# Patient Record
Sex: MALE | Race: BLACK OR AFRICAN AMERICAN | NOT HISPANIC OR LATINO
[De-identification: names, ages, dates, MRNs, and addresses within clinical notes are randomized per-mention and may not be internally consistent; named-entity substitution may affect disease eponyms.]

---

## 2017-01-03 ENCOUNTER — APPOINTMENT (OUTPATIENT)
Dept: INTERNAL MEDICINE | Facility: CLINIC | Age: 47
End: 2017-01-03

## 2017-01-03 VITALS
TEMPERATURE: 98.1 F | OXYGEN SATURATION: 98 % | DIASTOLIC BLOOD PRESSURE: 88 MMHG | HEART RATE: 76 BPM | BODY MASS INDEX: 34.8 KG/M2 | RESPIRATION RATE: 14 BRPM | SYSTOLIC BLOOD PRESSURE: 128 MMHG | HEIGHT: 69 IN | WEIGHT: 235 LBS

## 2017-01-06 LAB
AMPHET UR-MCNC: NEGATIVE
BARBITURATES UR-MCNC: NEGATIVE
BENZODIAZ UR-MCNC: NEGATIVE
COCAINE METAB.OTHER UR-MCNC: NEGATIVE
CREATININE, URINE: 133.7 MG/DL
METHADONE UR-MCNC: NEGATIVE
METHAQUALONE UR-MCNC: NEGATIVE
OPIATES UR-MCNC: NEGATIVE
PCP UR-MCNC: NEGATIVE
PROPOXYPH UR QL: NEGATIVE
THC UR QL: NEGATIVE

## 2017-05-11 ENCOUNTER — RX RENEWAL (OUTPATIENT)
Age: 47
End: 2017-05-11

## 2017-06-26 ENCOUNTER — APPOINTMENT (OUTPATIENT)
Dept: INTERNAL MEDICINE | Facility: CLINIC | Age: 47
End: 2017-06-26

## 2017-07-11 ENCOUNTER — APPOINTMENT (OUTPATIENT)
Dept: INTERNAL MEDICINE | Facility: CLINIC | Age: 47
End: 2017-07-11

## 2017-07-13 ENCOUNTER — APPOINTMENT (OUTPATIENT)
Dept: INTERNAL MEDICINE | Facility: CLINIC | Age: 47
End: 2017-07-13

## 2017-07-13 ENCOUNTER — APPOINTMENT (OUTPATIENT)
Dept: DERMATOLOGY | Facility: CLINIC | Age: 47
End: 2017-07-13

## 2017-07-13 VITALS
RESPIRATION RATE: 14 BRPM | HEIGHT: 69 IN | HEART RATE: 69 BPM | OXYGEN SATURATION: 98 % | SYSTOLIC BLOOD PRESSURE: 130 MMHG | TEMPERATURE: 98 F | WEIGHT: 232 LBS | BODY MASS INDEX: 34.36 KG/M2 | DIASTOLIC BLOOD PRESSURE: 90 MMHG

## 2017-07-13 VITALS — DIASTOLIC BLOOD PRESSURE: 90 MMHG | SYSTOLIC BLOOD PRESSURE: 130 MMHG

## 2017-07-13 DIAGNOSIS — B35.3 TINEA PEDIS: ICD-10-CM

## 2017-07-13 DIAGNOSIS — Z87.891 PERSONAL HISTORY OF NICOTINE DEPENDENCE: ICD-10-CM

## 2017-07-13 DIAGNOSIS — Z86.39 PERSONAL HISTORY OF OTHER ENDOCRINE, NUTRITIONAL AND METABOLIC DISEASE: ICD-10-CM

## 2017-07-13 DIAGNOSIS — Z80.8 FAMILY HISTORY OF MALIGNANT NEOPLASM OF OTHER ORGANS OR SYSTEMS: ICD-10-CM

## 2017-07-13 DIAGNOSIS — L73.9 FOLLICULAR DISORDER, UNSPECIFIED: ICD-10-CM

## 2017-07-17 LAB
ADJUSTED MITOGEN: >10 IU/ML
ADJUSTED TB AG: -0.02 IU/ML
M TB IFN-G BLD-IMP: NEGATIVE
QUANTIFERON GOLD NIL: 0.06 IU/ML

## 2017-07-31 ENCOUNTER — RX RENEWAL (OUTPATIENT)
Age: 47
End: 2017-07-31

## 2017-08-10 ENCOUNTER — APPOINTMENT (OUTPATIENT)
Dept: DERMATOLOGY | Facility: CLINIC | Age: 47
End: 2017-08-10

## 2017-10-21 ENCOUNTER — RX RENEWAL (OUTPATIENT)
Age: 47
End: 2017-10-21

## 2017-12-13 ENCOUNTER — RX RENEWAL (OUTPATIENT)
Age: 47
End: 2017-12-13

## 2018-03-02 ENCOUNTER — APPOINTMENT (OUTPATIENT)
Dept: INTERNAL MEDICINE | Facility: CLINIC | Age: 48
End: 2018-03-02

## 2018-04-23 ENCOUNTER — APPOINTMENT (OUTPATIENT)
Dept: INTERNAL MEDICINE | Facility: CLINIC | Age: 48
End: 2018-04-23

## 2018-07-27 PROBLEM — Z80.8 FAMILY HISTORY OF SKIN CANCER: Status: INACTIVE | Noted: 2017-07-13

## 2019-02-16 ENCOUNTER — HOSPITAL ENCOUNTER (EMERGENCY)
Age: 49
Discharge: HOME | End: 2019-02-17
Attending: EMERGENCY MEDICINE
Payer: SELF-PAY

## 2019-02-16 VITALS
WEIGHT: 240 LBS | BODY MASS INDEX: 33.6 KG/M2 | HEIGHT: 71 IN | DIASTOLIC BLOOD PRESSURE: 88 MMHG | HEART RATE: 99 BPM | TEMPERATURE: 98.5 F | RESPIRATION RATE: 14 BRPM | SYSTOLIC BLOOD PRESSURE: 181 MMHG | OXYGEN SATURATION: 100 %
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
## 2019-02-16 DIAGNOSIS — S39.012A BACK STRAIN, INITIAL ENCOUNTER: Primary | ICD-10-CM

## 2019-02-16 PROCEDURE — 99284 EMERGENCY DEPT VISIT MOD MDM: CPT

## 2019-02-16 RX ORDER — IBUPROFEN 400 MG/1
800 TABLET ORAL
Status: COMPLETED | OUTPATIENT
Start: 2019-02-16 | End: 2019-02-17

## 2019-02-17 ENCOUNTER — APPOINTMENT (OUTPATIENT)
Dept: GENERAL RADIOLOGY | Age: 49
End: 2019-02-17
Attending: EMERGENCY MEDICINE
Payer: SELF-PAY

## 2019-02-17 PROCEDURE — 72100 X-RAY EXAM L-S SPINE 2/3 VWS: CPT

## 2019-02-17 PROCEDURE — 74011250637 HC RX REV CODE- 250/637: Performed by: EMERGENCY MEDICINE

## 2019-02-17 RX ORDER — CYCLOBENZAPRINE HCL 10 MG
10 TABLET ORAL
Qty: 15 TAB | Refills: 0 | Status: SHIPPED | OUTPATIENT
Start: 2019-02-17

## 2019-02-17 RX ORDER — IBUPROFEN 600 MG/1
600 TABLET ORAL
Qty: 30 TAB | Refills: 0 | Status: SHIPPED | OUTPATIENT
Start: 2019-02-17

## 2019-02-17 RX ADMIN — IBUPROFEN 800 MG: 400 TABLET ORAL at 00:18
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
## 2019-02-27 ENCOUNTER — APPOINTMENT (OUTPATIENT)
Dept: INTERNAL MEDICINE | Facility: CLINIC | Age: 49
End: 2019-02-27

## 2020-05-08 ENCOUNTER — TRANSCRIPTION ENCOUNTER (OUTPATIENT)
Age: 50
End: 2020-05-08

## 2020-05-12 ENCOUNTER — TRANSCRIPTION ENCOUNTER (OUTPATIENT)
Age: 50
End: 2020-05-12

## 2020-07-13 ENCOUNTER — APPOINTMENT (OUTPATIENT)
Dept: INTERNAL MEDICINE | Facility: CLINIC | Age: 50
End: 2020-07-13
Payer: SELF-PAY

## 2020-07-13 ENCOUNTER — EMERGENCY (EMERGENCY)
Facility: HOSPITAL | Age: 50
LOS: 1 days | Discharge: ANOTHER HOSPITAL | End: 2020-07-13
Attending: EMERGENCY MEDICINE | Admitting: EMERGENCY MEDICINE
Payer: MEDICAID

## 2020-07-13 ENCOUNTER — INPATIENT (INPATIENT)
Facility: HOSPITAL | Age: 50
LOS: 2 days | Discharge: HOME | DRG: 872 | End: 2020-07-16
Attending: STUDENT IN AN ORGANIZED HEALTH CARE EDUCATION/TRAINING PROGRAM | Admitting: STUDENT IN AN ORGANIZED HEALTH CARE EDUCATION/TRAINING PROGRAM
Payer: MEDICAID

## 2020-07-13 VITALS
RESPIRATION RATE: 18 BRPM | HEART RATE: 98 BPM | WEIGHT: 257.06 LBS | TEMPERATURE: 100 F | HEIGHT: 71 IN | SYSTOLIC BLOOD PRESSURE: 148 MMHG | DIASTOLIC BLOOD PRESSURE: 84 MMHG

## 2020-07-13 VITALS
TEMPERATURE: 101 F | SYSTOLIC BLOOD PRESSURE: 150 MMHG | HEART RATE: 108 BPM | DIASTOLIC BLOOD PRESSURE: 85 MMHG | OXYGEN SATURATION: 96 % | WEIGHT: 257.06 LBS | HEIGHT: 71 IN | RESPIRATION RATE: 16 BRPM

## 2020-07-13 VITALS
DIASTOLIC BLOOD PRESSURE: 80 MMHG | SYSTOLIC BLOOD PRESSURE: 135 MMHG | TEMPERATURE: 99 F | OXYGEN SATURATION: 97 % | RESPIRATION RATE: 16 BRPM | HEART RATE: 107 BPM

## 2020-07-13 VITALS
OXYGEN SATURATION: 98 % | RESPIRATION RATE: 16 BRPM | HEIGHT: 69 IN | SYSTOLIC BLOOD PRESSURE: 136 MMHG | DIASTOLIC BLOOD PRESSURE: 88 MMHG | HEART RATE: 117 BPM | TEMPERATURE: 99 F

## 2020-07-13 DIAGNOSIS — E03.9 HYPOTHYROIDISM, UNSPECIFIED: ICD-10-CM

## 2020-07-13 DIAGNOSIS — N50.82 SCROTAL PAIN: ICD-10-CM

## 2020-07-13 DIAGNOSIS — A41.9 SEPSIS, UNSPECIFIED ORGANISM: ICD-10-CM

## 2020-07-13 DIAGNOSIS — N45.3 EPIDIDYMO-ORCHITIS: ICD-10-CM

## 2020-07-13 DIAGNOSIS — Z29.9 ENCOUNTER FOR PROPHYLACTIC MEASURES, UNSPECIFIED: ICD-10-CM

## 2020-07-13 DIAGNOSIS — Z98.890 OTHER SPECIFIED POSTPROCEDURAL STATES: Chronic | ICD-10-CM

## 2020-07-13 LAB
ALBUMIN SERPL ELPH-MCNC: 3.8 G/DL — SIGNIFICANT CHANGE UP (ref 3.3–5)
ALP SERPL-CCNC: 54 U/L — SIGNIFICANT CHANGE UP (ref 30–120)
ALT FLD-CCNC: 34 U/L DA — SIGNIFICANT CHANGE UP (ref 10–60)
ANION GAP SERPL CALC-SCNC: 9 MMOL/L — SIGNIFICANT CHANGE UP (ref 5–17)
APPEARANCE UR: CLEAR — SIGNIFICANT CHANGE UP
APTT BLD: 31.5 SEC — SIGNIFICANT CHANGE UP (ref 27.5–35.5)
AST SERPL-CCNC: 27 U/L — SIGNIFICANT CHANGE UP (ref 10–40)
BACTERIA # UR AUTO: ABNORMAL
BASOPHILS # BLD AUTO: 0.03 K/UL — SIGNIFICANT CHANGE UP (ref 0–0.2)
BASOPHILS NFR BLD AUTO: 0.2 % — SIGNIFICANT CHANGE UP (ref 0–2)
BILIRUB SERPL-MCNC: 0.7 MG/DL — SIGNIFICANT CHANGE UP (ref 0.2–1.2)
BILIRUB UR-MCNC: NEGATIVE — SIGNIFICANT CHANGE UP
BUN SERPL-MCNC: 11 MG/DL — SIGNIFICANT CHANGE UP (ref 7–23)
CALCIUM SERPL-MCNC: 9 MG/DL — SIGNIFICANT CHANGE UP (ref 8.4–10.5)
CHLORIDE SERPL-SCNC: 98 MMOL/L — SIGNIFICANT CHANGE UP (ref 96–108)
CO2 SERPL-SCNC: 28 MMOL/L — SIGNIFICANT CHANGE UP (ref 22–31)
COLOR SPEC: YELLOW — SIGNIFICANT CHANGE UP
CREAT SERPL-MCNC: 1.16 MG/DL — SIGNIFICANT CHANGE UP (ref 0.5–1.3)
DIFF PNL FLD: ABNORMAL
EOSINOPHIL # BLD AUTO: 0.02 K/UL — SIGNIFICANT CHANGE UP (ref 0–0.5)
EOSINOPHIL NFR BLD AUTO: 0.1 % — SIGNIFICANT CHANGE UP (ref 0–6)
EPI CELLS # UR: NEGATIVE — SIGNIFICANT CHANGE UP
GLUCOSE SERPL-MCNC: 105 MG/DL — HIGH (ref 70–99)
GLUCOSE UR QL: NEGATIVE MG/DL — SIGNIFICANT CHANGE UP
HCT VFR BLD CALC: 44.9 % — SIGNIFICANT CHANGE UP (ref 39–50)
HGB BLD-MCNC: 13.8 G/DL — SIGNIFICANT CHANGE UP (ref 13–17)
IMM GRANULOCYTES NFR BLD AUTO: 0.5 % — SIGNIFICANT CHANGE UP (ref 0–1.5)
INR BLD: 1.16 RATIO — SIGNIFICANT CHANGE UP (ref 0.88–1.16)
KETONES UR-MCNC: NEGATIVE — SIGNIFICANT CHANGE UP
LACTATE SERPL-SCNC: 0.8 MMOL/L — SIGNIFICANT CHANGE UP (ref 0.7–2)
LEUKOCYTE ESTERASE UR-ACNC: NEGATIVE — SIGNIFICANT CHANGE UP
LYMPHOCYTES # BLD AUTO: 1.1 K/UL — SIGNIFICANT CHANGE UP (ref 1–3.3)
LYMPHOCYTES # BLD AUTO: 7.6 % — LOW (ref 13–44)
MCHC RBC-ENTMCNC: 27.6 PG — SIGNIFICANT CHANGE UP (ref 27–34)
MCHC RBC-ENTMCNC: 30.7 GM/DL — LOW (ref 32–36)
MCV RBC AUTO: 89.8 FL — SIGNIFICANT CHANGE UP (ref 80–100)
MONOCYTES # BLD AUTO: 0.73 K/UL — SIGNIFICANT CHANGE UP (ref 0–0.9)
MONOCYTES NFR BLD AUTO: 5 % — SIGNIFICANT CHANGE UP (ref 2–14)
NEUTROPHILS # BLD AUTO: 12.53 K/UL — HIGH (ref 1.8–7.4)
NEUTROPHILS NFR BLD AUTO: 86.6 % — HIGH (ref 43–77)
NITRITE UR-MCNC: POSITIVE
NRBC # BLD: 0 /100 WBCS — SIGNIFICANT CHANGE UP (ref 0–0)
PH UR: 8 — SIGNIFICANT CHANGE UP (ref 5–8)
PLATELET # BLD AUTO: 248 K/UL — SIGNIFICANT CHANGE UP (ref 150–400)
POTASSIUM SERPL-MCNC: 3.8 MMOL/L — SIGNIFICANT CHANGE UP (ref 3.5–5.3)
POTASSIUM SERPL-SCNC: 3.8 MMOL/L — SIGNIFICANT CHANGE UP (ref 3.5–5.3)
PROT SERPL-MCNC: 8.1 G/DL — SIGNIFICANT CHANGE UP (ref 6–8.3)
PROT UR-MCNC: NEGATIVE MG/DL — SIGNIFICANT CHANGE UP
PROTHROM AB SERPL-ACNC: 13.9 SEC — HIGH (ref 10.6–13.6)
RBC # BLD: 5 M/UL — SIGNIFICANT CHANGE UP (ref 4.2–5.8)
RBC # FLD: 12.9 % — SIGNIFICANT CHANGE UP (ref 10.3–14.5)
RBC CASTS # UR COMP ASSIST: SIGNIFICANT CHANGE UP /HPF (ref 0–4)
SODIUM SERPL-SCNC: 135 MMOL/L — SIGNIFICANT CHANGE UP (ref 135–145)
SP GR SPEC: 1.01 — SIGNIFICANT CHANGE UP (ref 1.01–1.02)
UROBILINOGEN FLD QL: NEGATIVE MG/DL — SIGNIFICANT CHANGE UP
WBC # BLD: 14.48 K/UL — HIGH (ref 3.8–10.5)
WBC # FLD AUTO: 14.48 K/UL — HIGH (ref 3.8–10.5)
WBC UR QL: SIGNIFICANT CHANGE UP

## 2020-07-13 PROCEDURE — 99213 OFFICE O/P EST LOW 20 MIN: CPT

## 2020-07-13 PROCEDURE — 99285 EMERGENCY DEPT VISIT HI MDM: CPT | Mod: 25

## 2020-07-13 PROCEDURE — 80053 COMPREHEN METABOLIC PANEL: CPT

## 2020-07-13 PROCEDURE — 76870 US EXAM SCROTUM: CPT | Mod: 26

## 2020-07-13 PROCEDURE — 71046 X-RAY EXAM CHEST 2 VIEWS: CPT | Mod: 26

## 2020-07-13 PROCEDURE — 74177 CT ABD & PELVIS W/CONTRAST: CPT | Mod: 26

## 2020-07-13 PROCEDURE — 74177 CT ABD & PELVIS W/CONTRAST: CPT

## 2020-07-13 PROCEDURE — 85610 PROTHROMBIN TIME: CPT

## 2020-07-13 PROCEDURE — 85730 THROMBOPLASTIN TIME PARTIAL: CPT

## 2020-07-13 PROCEDURE — 93010 ELECTROCARDIOGRAM REPORT: CPT

## 2020-07-13 PROCEDURE — 83605 ASSAY OF LACTIC ACID: CPT

## 2020-07-13 PROCEDURE — 93005 ELECTROCARDIOGRAM TRACING: CPT

## 2020-07-13 PROCEDURE — 99285 EMERGENCY DEPT VISIT HI MDM: CPT

## 2020-07-13 PROCEDURE — 96374 THER/PROPH/DIAG INJ IV PUSH: CPT | Mod: XU

## 2020-07-13 PROCEDURE — 99223 1ST HOSP IP/OBS HIGH 75: CPT | Mod: GC,AI

## 2020-07-13 PROCEDURE — 76870 US EXAM SCROTUM: CPT

## 2020-07-13 PROCEDURE — 85027 COMPLETE CBC AUTOMATED: CPT

## 2020-07-13 PROCEDURE — 87040 BLOOD CULTURE FOR BACTERIA: CPT

## 2020-07-13 PROCEDURE — 36415 COLL VENOUS BLD VENIPUNCTURE: CPT

## 2020-07-13 PROCEDURE — 96375 TX/PRO/DX INJ NEW DRUG ADDON: CPT

## 2020-07-13 PROCEDURE — 81001 URINALYSIS AUTO W/SCOPE: CPT

## 2020-07-13 PROCEDURE — 99283 EMERGENCY DEPT VISIT LOW MDM: CPT

## 2020-07-13 PROCEDURE — 71046 X-RAY EXAM CHEST 2 VIEWS: CPT

## 2020-07-13 PROCEDURE — 96361 HYDRATE IV INFUSION ADD-ON: CPT

## 2020-07-13 RX ORDER — ONDANSETRON 8 MG/1
4 TABLET, FILM COATED ORAL ONCE
Refills: 0 | Status: COMPLETED | OUTPATIENT
Start: 2020-07-13 | End: 2020-07-13

## 2020-07-13 RX ORDER — ACETAMINOPHEN 500 MG
650 TABLET ORAL EVERY 4 HOURS
Refills: 0 | Status: DISCONTINUED | OUTPATIENT
Start: 2020-07-13 | End: 2020-07-14

## 2020-07-13 RX ORDER — OXYCODONE HYDROCHLORIDE 5 MG/1
5 TABLET ORAL EVERY 4 HOURS
Refills: 0 | Status: DISCONTINUED | OUTPATIENT
Start: 2020-07-13 | End: 2020-07-14

## 2020-07-13 RX ORDER — ENOXAPARIN SODIUM 100 MG/ML
40 INJECTION SUBCUTANEOUS DAILY
Refills: 0 | Status: DISCONTINUED | OUTPATIENT
Start: 2020-07-13 | End: 2020-07-16

## 2020-07-13 RX ORDER — MORPHINE SULFATE 50 MG/1
4 CAPSULE, EXTENDED RELEASE ORAL ONCE
Refills: 0 | Status: DISCONTINUED | OUTPATIENT
Start: 2020-07-13 | End: 2020-07-13

## 2020-07-13 RX ORDER — SODIUM CHLORIDE 9 MG/ML
1000 INJECTION INTRAMUSCULAR; INTRAVENOUS; SUBCUTANEOUS ONCE
Refills: 0 | Status: COMPLETED | OUTPATIENT
Start: 2020-07-13 | End: 2020-07-13

## 2020-07-13 RX ORDER — ENOXAPARIN SODIUM 100 MG/ML
40 INJECTION SUBCUTANEOUS DAILY
Refills: 0 | Status: DISCONTINUED | OUTPATIENT
Start: 2020-07-13 | End: 2020-07-17

## 2020-07-13 RX ORDER — MORPHINE SULFATE 50 MG/1
2 CAPSULE, EXTENDED RELEASE ORAL EVERY 4 HOURS
Refills: 0 | Status: DISCONTINUED | OUTPATIENT
Start: 2020-07-13 | End: 2020-07-14

## 2020-07-13 RX ADMIN — ONDANSETRON 4 MILLIGRAM(S): 8 TABLET, FILM COATED ORAL at 18:37

## 2020-07-13 RX ADMIN — MORPHINE SULFATE 4 MILLIGRAM(S): 50 CAPSULE, EXTENDED RELEASE ORAL at 18:38

## 2020-07-13 RX ADMIN — SODIUM CHLORIDE 1000 MILLILITER(S): 9 INJECTION INTRAMUSCULAR; INTRAVENOUS; SUBCUTANEOUS at 20:46

## 2020-07-13 RX ADMIN — SODIUM CHLORIDE 1000 MILLILITER(S): 9 INJECTION INTRAMUSCULAR; INTRAVENOUS; SUBCUTANEOUS at 18:37

## 2020-07-13 NOTE — ED PROVIDER NOTE - ENMT, MLM
Airway patent, Nasal mucosa clear. Mouth with normal mucosa. Throat has no vesicles, no oropharyngeal exudates and uvula is midline. Airway patent, Nasal mucosa clear. Mouth with normal mucosa. Throat has no vesicles, no oropharyngeal exudates and uvula is midline. MM Moist. non-toxic, well appearing.

## 2020-07-13 NOTE — ED PROVIDER NOTE - GENITOURINARY, MLM
+diffuse swelling to scrotum bilateral, left greater than right, large firm tender mass inside left scrotum, unable to distinctly palpate left testicle, tenderness up to inguinal canal, unable to assess cremasteric on left

## 2020-07-13 NOTE — H&P ADULT - ASSESSMENT
49 year old male PMHx hypothyroidism presents as transfer from [place] c/o left groin pain and swelling x5 days admitted for sepsis likely 2/2 left epididymoorchitis.

## 2020-07-13 NOTE — H&P ADULT - NSHPSOCIALHISTORY_GEN_ALL_CORE
Marital Status:  (   )    (   ) Single    (   )    (  )   Lives with: (  ) alone  (  ) children   (  ) spouse   (  ) parents  (  ) other  Recent Travel: No recent travel  Occupation:  Mobility:   Substance Use (street drugs): ( x ) never used  (  ) other:  Tobacco Usage:  ( x  ) never smoked   (   ) former smoker   (   ) current smoker  (     ) pack year  Alcohol Usage: None Marital Status:  (  x )    (   ) Single    (   )    (  )   Lives with: (  ) alone  ( x ) children   ( x ) spouse   (  ) parents  (  ) other  Recent Travel: No recent travel  Mobility: independently   Substance Use (street drugs): ( x ) never used  (  ) other:  Tobacco Usage:  ( x  ) never smoked   (   ) former smoker   (   ) current smoker  (     ) pack year  Alcohol Usage: socially  Sexual Hx: pt sexually active with wife only for many years, no hx of STDs, was HIV testing back in [place], denies testing today states he has no reason for infections

## 2020-07-13 NOTE — H&P ADULT - NSHPREVIEWOFSYSTEMS_GEN_ALL_CORE
Constitutional: denies fever, chills, diaphoresis   HEENT: denies blurry vision, double vision, eye pain, difficulty hearing  Respiratory: denies SOB, cough, sputum production  Cardiovascular: denies CP, palpitations, edema  Gastrointestinal: denies nausea, vomiting, diarrhea, constipation, abdominal pain  Genitourinary: denies dysuria, frequency, urgency, hematuria   Skin/Breast: denies rash, itching  Neurologic: denies headache, weakness, dizziness, paresthesias, numbness/tingling  Psychiatric: denies anxiety, depression, suicidal, homicidal thoughts  ROS negative except as noted above

## 2020-07-13 NOTE — ED ADULT NURSE NOTE - OBJECTIVE STATEMENT
Pt received sitting on stretcher in NAD. Pt AOx3 C/o groin pain. pt states that his left testicle was also swollen. _. Neuro WNL. PERRLA. Lungs CTA, RR even unlabored. Ab soft non tender, + bowel sounds x 4quads. Denies Nausea, Vomiting, Diarrhea. Skin warm, dry, color appropriate for age and race. MAEx4

## 2020-07-13 NOTE — H&P ADULT - NSHPREVIEWOFSYSTEMS_GEN_ALL_CORE
Constitutional: denies fever, chills, diaphoresis   HEENT: denies blurry vision, double vision, eye pain, difficulty hearing  Respiratory: denies SOB, cough, sputum production  Cardiovascular: denies CP, palpitations, edema  Gastrointestinal: denies nausea, vomiting, diarrhea, constipation, abdominal pain  Genitourinary: denies dysuria, frequency, urgency, hematuria   Skin/Breast: denies rash, itching  Neurologic: denies headache, weakness, dizziness, paresthesias, numbness/tingling  Psychiatric: denies anxiety, depression, suicidal, homicidal thoughts  ROS negative except as noted above Constitutional: denies fever, chills, diaphoresis   HEENT: denies blurry vision, double vision, eye pain, difficulty hearing  Respiratory: denies SOB, cough, sputum production  Cardiovascular: denies CP, palpitations, edema  Gastrointestinal: denies nausea, vomiting, diarrhea, constipation, abdominal pain  Genitourinary: endorses L testicular swelling and pain, denies dysuria, frequency, urgency, hematuria   Skin/Breast: denies rash, itching  Neurologic: denies headache, weakness, dizziness, paresthesias, numbness/tingling  Psychiatric: denies anxiety, depression, suicidal, homicidal thoughts

## 2020-07-13 NOTE — H&P ADULT - ASSESSMENT
49 year old male PMHx hypothyroidism presents as transfer from [place] c/o left groin pain and swelling x5 days admitted for left epididymoorchitis.

## 2020-07-13 NOTE — ED ADULT NURSE NOTE - OBJECTIVE STATEMENT
patient has c/o testicular pain since Wednesday, took motrin and it helped a bit but got worse today, stated feeling swollen, Patient denies sick contacts/ no fever/chills/cough at this time, denies SOB and no acute distress. also febrile, abdomen is round and distended but it is baseline, denies any abdominal pain, nontender to touch and soft, bm is normal, bs positive all quadrants of abdomen. IV accessed and tolerating. Labs drawn & sent results pending. MD at bedside and assess patient. will continue to monitor.

## 2020-07-13 NOTE — H&P ADULT - HISTORY OF PRESENT ILLNESS
49 year old male PMHx hypothyroidism presents as transfer from [place] c/o left groin pain and swelling x5 days. Denies trauma to testicles. Denies recent illness, sick contacts, recent travel.     Vitals: T 100.5  /85 RR 16 SpO2 96% on RA  Labs: WBC 14.48, Neutrophil 12.53 (86.6%), Lymphocyte 7.6%, Glu 105  UA: positive for nitrites, trace blood, many bacteria   CT abdomen/pelvis w/IV cont: partially imaged left scrotal wall thickening, better visualized on scrotal ultrasound of same date.  US testicles: Findings suggestive of left epididymoorchitis with increased flow to left testis and left epididymis. There is associated complex hydrocele  Given Levaquin 500 mg IV x1, IV NS 1L bolus x1, Morphine 4 mg IVP x1, Zofran 4 mg IVP x1 49 year old male PMHx hypothyroidism presents as transfer from [place] c/o left groin pain and swelling x5 days. He states that on Tuesday he lifted something very heavy and subsequently Weds developed scrotal pain. He denies hearing a pop or any noise but he did feel a pulling in his left groin. He states the pain is a 10/10 and is localized to his left scrotal area, denies radiating pain to his abdomen. He states when he holds up his scrotum the sensation is different but still just as painful. He has been taking Aleve OTC for the pain which was helping last week but did not relieve his symptoms today. He took Aleve at 3 pm today. Denies recent illness, sick contacts, recent travel. He went to his PCP today and was sent to the ED from there. He felt feverish today but was afebrile upon arrival to ED. He denies visual changes, H/A, CP, SOB, cough, dizziness, syncope, abd pain, N, V, D, urinary urigency frequency or dysuria, and blood in urine. He has a hx of hypothyroidism but has not taken levothyroxine for about 1 year due to loss of insurance.     Vitals: T 100.5  /85 RR 16 SpO2 96% on RA  Labs: WBC 14.48, Neutrophil 12.53 (86.6%), Lymphocyte 7.6%, Glu 105  UA: positive for nitrites, trace blood, many bacteria   CT abdomen/pelvis w/IV cont: partially imaged left scrotal wall thickening, better visualized on scrotal ultrasound of same date.  US testicles: Findings suggestive of left epididymoorchitis with increased flow to left testis and left epididymis. There is associated complex hydrocele  EKG: Sinus tachy 109 BPM, borderline LVH, nonspecific T wave abnormality  Given Levaquin 500 mg IV x1, IV NS 1L bolus x1, Morphine 4 mg IVP x1, Zofran 4 mg IVP x1

## 2020-07-13 NOTE — HISTORY OF PRESENT ILLNESS
[FreeTextEntry8] : Pt lifted something heavy 5 days ago. He developed pain and swelling of his left scrotum.\par The area is still swollen and painful.

## 2020-07-13 NOTE — H&P ADULT - PROBLEM SELECTOR PLAN 4
IMPROVE VTE Individual Risk Assessment          RISK                                                          Points  [  ] Previous VTE                                                3  [  ] Thrombophilia                                             2  [  ] Lower limb paralysis                                   2        (unable to hold up >15 seconds)    [  ] Current Cancer                                             2         (within 6 months)  [  ] Immobilization > 24 hrs                              1  [  ] ICU/CCU stay > 24 hours                             1  [  ] Age > 60                                                         1    IMPROVE VTE Score:         [    0     ]    DVT PPx: Lovenox 40 mg subQ qhs

## 2020-07-13 NOTE — ED PROVIDER NOTE - PHYSICAL EXAMINATION
Constitutional: Awake, Alert, non-toxic-appearing. NAD. Well appearing, well nourished. Cooperative. VSS.  HEAD: NC/AT; no signs of trauma   EYES: Conjunctiva and sclera are clear bilaterally. EOMI.  ENT: No rhinorrhea, normal pharynx, oropharynx patent, no tonsillar exudates or enlargement, MMM, no erythema, no drooling or stridor.   NECK: Supple, non-tender. No nuchal rigidity. FROM. No midline or paraspinal TTP.  CARDIOVASCULAR: Normal S1, S2; RRR. No audible murmurs. No chest wall ttp. Radial pulses +2 B/L.  RESPIRATORY: Speaking full sentences. Normal respiratory effort; breath sounds CTAB, No WRR. No accessory muscle use.   ABDOMEN: Soft; llq ttp;   : scrotal swelling b/l, left scrotum larger than right with ttp and firm mass  EXTREMITIES: Full passive and active ROM in all extremities; non-tender to palpation; distal pulses palpable and symmetric, no edema, no crepitus or step off.  SKIN: Warm, dry; good skin turgor, no apparent lesions or rashes, no ecchymosis, brisk capillary refill.  NEURO: AAOx3 follows commands. No facial droop. No truncal ataxia.

## 2020-07-13 NOTE — H&P ADULT - PROBLEM SELECTOR PLAN 2
- CT abdomen/pelvis w/IV cont: partially imaged left scrotal wall thickening, better visualized on scrotal ultrasound of same date.  - US testicles: Findings suggestive of left epididymoorchitis with increased flow to left testis and left epididymis. There is associated complex hydrocele  - Given Levaquin 500 mg IV x1, IV NS 1L bolus x1, Morphine 4 mg IVP x1, Zofran 4 mg IVP x1  - Continue ---- - CT abdomen/pelvis w/IV cont: partially imaged left scrotal wall thickening, better visualized on scrotal ultrasound of same date.  - US testicles: Findings suggestive of left epididymoorchitis with increased flow to left testis and left epididymis. There is associated complex hydrocele  - Given Levaquin 500 mg IV x1, IV NS 1L bolus x1, Morphine 4 mg IVP x1, Zofran 4 mg IVP x1

## 2020-07-13 NOTE — ED PROVIDER NOTE - OBJECTIVE STATEMENT
48 yo M presents as a transfer from  for admission to  for epididymo-orchitis for IV abx; originally presented for left groin pain and swelling radiating to lower abd for the past 5 days. Pt noticed it a few hours after doing heavy lifting. No recent illness. No known covid exposures. No known fevers or chills, chest pain, SOB, dysuria, hematuria. No trauma to testicles. No other injuries or complaints.

## 2020-07-13 NOTE — H&P ADULT - PROBLEM SELECTOR PLAN 1
Patient meets sepsis criteria on admission: T 100.5  WBC 14.48  - CT abdomen/pelvis w/IV cont: partially imaged left scrotal wall thickening, better visualized on scrotal ultrasound of same date.  - US testicles: Findings suggestive of left epididymoorchitis with increased flow to left testis and left epididymis. There is associated complex hydrocele  - Given Levaquin 500 mg IV x1, IV NS 1L bolus x1, Morphine 4 mg IVP x1, Zofran 4 mg IVP x1 Patient meets sepsis criteria on admission: T 100.5  WBC 14.48  - CT abdomen/pelvis w/IV cont: partially imaged left scrotal wall thickening, better visualized on scrotal ultrasound of same date.  - US testicles: Findings suggestive of left epididymoorchitis with increased flow to left testis and left epididymis. There is associated complex hydrocele  - Given Levaquin 500 mg IV x1, IV NS 1L bolus x1, Morphine 4 mg IVP x1, Zofran 4 mg IVP x1  - Continue ----  - Lactate wnl  - F/u BCx x2  - Trend WBC  - Tylenol PRN for fever and mild pain

## 2020-07-13 NOTE — PLAN
[FreeTextEntry1] : Sent pt to the ER at [facility] for evaluate of acute left scrotal pain and swelling

## 2020-07-13 NOTE — PHYSICAL EXAM
[No Acute Distress] : no acute distress [Well Developed] : well developed [Well-Appearing] : well-appearing [Well Nourished] : well nourished [PERRL] : pupils equal round and reactive to light [Normal Sclera/Conjunctiva] : normal sclera/conjunctiva [EOMI] : extraocular movements intact [Normal Outer Ear/Nose] : the outer ears and nose were normal in appearance [Normal Oropharynx] : the oropharynx was normal [No JVD] : no jugular venous distention [No Lymphadenopathy] : no lymphadenopathy [Supple] : supple [Thyroid Normal, No Nodules] : the thyroid was normal and there were no nodules present [No Respiratory Distress] : no respiratory distress  [Clear to Auscultation] : lungs were clear to auscultation bilaterally [No Accessory Muscle Use] : no accessory muscle use [Normal Rate] : normal rate  [Normal S1, S2] : normal S1 and S2 [Regular Rhythm] : with a regular rhythm [No Carotid Bruits] : no carotid bruits [No Murmur] : no murmur heard [No Abdominal Bruit] : a ~M bruit was not heard ~T in the abdomen [No Varicosities] : no varicosities [No Edema] : there was no peripheral edema [Pedal Pulses Present] : the pedal pulses are present [No Palpable Aorta] : no palpable aorta [No Extremity Clubbing/Cyanosis] : no extremity clubbing/cyanosis [Non Tender] : non-tender [Soft] : abdomen soft [No Masses] : no abdominal mass palpated [Non-distended] : non-distended [No HSM] : no HSM [Normal Bowel Sounds] : normal bowel sounds [Normal Posterior Cervical Nodes] : no posterior cervical lymphadenopathy [Normal Anterior Cervical Nodes] : no anterior cervical lymphadenopathy [No CVA Tenderness] : no CVA  tenderness [No Spinal Tenderness] : no spinal tenderness [No Joint Swelling] : no joint swelling [No Rash] : no rash [Grossly Normal Strength/Tone] : grossly normal strength/tone [Coordination Grossly Intact] : coordination grossly intact [No Focal Deficits] : no focal deficits [Normal Gait] : normal gait [Deep Tendon Reflexes (DTR)] : deep tendon reflexes were 2+ and symmetric [Normal Affect] : the affect was normal [Normal Insight/Judgement] : insight and judgment were intact [de-identified] : Left scrotum is swollen and extremely tender.

## 2020-07-13 NOTE — H&P ADULT - PROBLEM SELECTOR PLAN 1
Patient meets sepsis criteria on admission: T 100.5  WBC 14.48  - CT abdomen/pelvis w/IV cont: partially imaged left scrotal wall thickening, better visualized on scrotal ultrasound of same date.  - US testicles: Findings suggestive of left epididymoorchitis with increased flow to left testis and left epididymis. There is associated complex hydrocele  - Given Levaquin 500 mg IV x1, IV NS 1L bolus x1, Morphine 4 mg IVP x1, Zofran 4 mg IVP x1  - Continue ----  - Lactate wnl  - F/u BCx x2  - Trend WBC  - Tylenol PRN for fever and mild pain. -pt with left scrotal pain and swelling x 5 days s/p heavy lifting  -Patient meets sepsis criteria on admission: T 100.5  WBC 14.48, likely 2/2 epididymoorchitis   -CT abdomen/pelvis w/IV cont: partially imaged left scrotal wall thickening, better visualized on scrotal ultrasound of same date.  - US testicles: Findings suggestive of left epididymoorchitis with increased flow to left testis and left epididymis. There is associated complex hydrocele  - Given Levaquin 500 mg IV x1, IV NS 1L bolus x1, Morphine 4 mg IVP x1, Zofran 4 mg IVP x1 at [facility]  -Continue PO Levaquin 500 for 10 days total  -Continue pain control tylenol PO for mild, Percocet 5/325 q 6 for moderate, and Percocet 10/325 q 6 for severe.  - Lactate wnl, continue PO hydration  - F/u BCx x2  - Trend WBC  -urology (Dr. Haynes) consulted, f/u recs -pt with left scrotal pain and swelling x 5 days s/p heavy lifting  -Patient meets sepsis criteria on admission: T 100.5  WBC 14.48, likely 2/2 epididymoorchitis   -CT abdomen/pelvis w/IV cont: partially imaged left scrotal wall thickening, better visualized on scrotal ultrasound of same date.  - US testicles: Findings suggestive of left epididymoorchitis with increased flow to left testis and left epididymis. There is associated complex hydrocele  - Given Levaquin 500 mg IV x1, IV NS 1L bolus x1, Morphine 4 mg IVP x1, Zofran 4 mg IVP x1 at [facility]  -Continue PO Levaquin 500 for 10 days total  -Continue pain control tylenol PO for mild, Percocet 5/325 q 6 for moderate, and Percocet 10/325 q 6 for severe.  -tylenol q 6 PRN for fevers  - Lactate wnl, continue PO hydration  - F/u BCx x2  - Trend WBC  -urology (Dr. Haynes) consulted, f/u recs

## 2020-07-13 NOTE — ED PROVIDER NOTE - CLINICAL SUMMARY MEDICAL DECISION MAKING FREE TEXT BOX
48 yo M presents as a transfer fro [facility] to [facility] for admission for IV Abx for epididymo-orchitis.  VS low grade fever at [facility], afebrile in [facility]  endorsed to hospitalist for admission.

## 2020-07-13 NOTE — H&P ADULT - HISTORY OF PRESENT ILLNESS
49 year old male PMHx hypothyroidism presents as transfer from [place] c/o left groin pain and swelling x5 days. Denies trauma to testicles. Denies recent illness, sick contacts, recent travel.     Vitals: T 100.5  /85 RR 16 SpO2 96% on RA  Labs: WBC 14.48, Neutrophil 12.53 (86.6%), Lymphocyte 7.6%, Glu 105  UA: positive for nitrites, trace blood, many bacteria   CT abdomen/pelvis w/IV cont: partially imaged left scrotal wall thickening, better visualized on scrotal ultrasound of same date.  US testicles: Findings suggestive of left epididymoorchitis with increased flow to left testis and left epididymis. There is associated complex hydrocele  Given Levaquin 500 mg IV x1, IV NS 1L bolus x1, Morphine 4 mg IVP x1, Zofran 4 mg IVP x1

## 2020-07-13 NOTE — H&P ADULT - PROBLEM SELECTOR PLAN 3
Patient states he has not taken Synthroid for over 1 year  - F/u TSH AM -chronic pt states he has not taken Synthroid for over 1 year due to insurance issues  - F/u TSH AM

## 2020-07-13 NOTE — ED PROVIDER NOTE - OBJECTIVE STATEMENT
49 year old male with presents to the ED for left groin pain radiating to lower abd for the past 5 days. Pt noticed it a few hours after doing heavy lifting. No recent illness. No known covid exposures. No known fevers or chills, chest pain, SOB, dysuria, hematuria. No trauma to testicles. No other injuries or complaints. 49 year old male with presents to the ED for left groin pain and swelling radiating to lower abd for the past 5 days. Pt noticed it a few hours after doing heavy lifting. No recent illness. No known covid exposures. No known fevers or chills, chest pain, SOB, dysuria, hematuria. No trauma to testicles. No other injuries or complaints.

## 2020-07-13 NOTE — H&P ADULT - PROBLEM SELECTOR PLAN 4
IMPROVE VTE Individual Risk Assessment          RISK                                                          Points  [  ] Previous VTE                                                3  [  ] Thrombophilia                                             2  [  ] Lower limb paralysis                                   2        (unable to hold up >15 seconds)    [  ] Current Cancer                                             2         (within 6 months)  [  ] Immobilization > 24 hrs                              1  [  ] ICU/CCU stay > 24 hours                             1  [  ] Age > 60                                                         1    IMPROVE VTE Score:         [     0    ]    DVT PPx: Lovenox 40 mg subQ qd lovenox 40 subq daily for VTE chemoprophylaxis for now until patient starts ambulating    IMPROVE VTE Individual Risk Assessment        RISK                                                          Points  [  ] Previous VTE                                                3  [  ] Thrombophilia                                             2  [  ] Lower limb paralysis                                   2        (unable to hold up >15 seconds)    [  ] Current Cancer                                             2         (within 6 months)  [  ] Immobilization > 24 hrs                              1  [  ] ICU/CCU stay > 24 hours                             1  [  ] Age > 60                                                         1    IMPROVE VTE Score:         [     0    ]

## 2020-07-13 NOTE — H&P ADULT - NSHPPHYSICALEXAM_GEN_ALL_CORE
T(C): 37.4 (07-13-20 @ 22:14), Max: 38.1 (07-13-20 @ 17:50)  HR: 107 (07-13-20 @ 22:14) (106 - 108)  BP: 135/80 (07-13-20 @ 22:14) (134/75 - 150/85)  RR: 16 (07-13-20 @ 22:14) (15 - 16)  SpO2: 97% (07-13-20 @ 22:14) (96% - 97%)    GENERAL: patient appears well, no acute distress, appropriate, pleasant  EYES: sclera clear, no exudates  ENMT: oropharynx clear without erythema, no exudates, moist mucous membranes  NECK: supple, soft, no thyromegaly noted  LUNGS: good air entry bilaterally, clear to auscultation, symmetric breath sounds, no wheezing or rhonchi appreciated  HEART: soft S1/S2, regular rate and rhythm, no murmurs noted, no lower extremity edema  GASTROINTESTINAL: abdomen is soft, nontender, nondistended, normoactive bowel sounds, no palpable masses  INTEGUMENT: good skin turgor, no lesions noted  MUSCULOSKELETAL: no clubbing or cyanosis, no obvious deformity  NEUROLOGIC: awake, alert, oriented x3, good muscle tone in 4 extremities, no obvious sensory deficits  PSYCHIATRIC: mood is good, affect is congruent, linear and logical thought process  HEME/LYMPH: no palpable supraclavicular nodules, no obvious ecchymosis or petechiae

## 2020-07-13 NOTE — ED PROVIDER NOTE - NS_ ATTENDINGSCRIBEDETAILS _ED_A_ED_FT
Kalyan Zelaya MD - The scribe's documentation has been prepared under my direction and personally reviewed by me in its entirety. I confirm that the note above accurately reflects all work, treatment, procedures, and medical decision making performed by me.

## 2020-07-13 NOTE — H&P ADULT - NSHPPHYSICALEXAM_GEN_ALL_CORE
T(C): 37.7 (07-13-20 @ 22:44), Max: 37.7 (07-13-20 @ 22:44)  HR: 98 (07-13-20 @ 22:44) (98 - 98)  BP: 148/84 (07-13-20 @ 22:44) (148/84 - 148/84)  RR: 18 (07-13-20 @ 22:44) (18 - 18)  SpO2: --    GENERAL: patient appears well, no acute distress, appropriate, pleasant  EYES: sclera clear, no exudates  ENMT: oropharynx clear without erythema, no exudates, moist mucous membranes  NECK: supple, soft, no thyromegaly noted  LUNGS: good air entry bilaterally, clear to auscultation, symmetric breath sounds, no wheezing or rhonchi appreciated  HEART: soft S1/S2, regular rate and rhythm, no murmurs noted, no lower extremity edema  GASTROINTESTINAL: abdomen is soft, nontender, nondistended, normoactive bowel sounds, no palpable masses  INTEGUMENT: good skin turgor, no lesions noted  MUSCULOSKELETAL: no clubbing or cyanosis, no obvious deformity  NEUROLOGIC: awake, alert, oriented x3, good muscle tone in 4 extremities, no obvious sensory deficits  PSYCHIATRIC: mood is good, affect is congruent, linear and logical thought process  HEME/LYMPH: no palpable supraclavicular nodules, no obvious ecchymosis or petechiae T(C): 37.7 (07-13-20 @ 22:44), Max: 37.7 (07-13-20 @ 22:44)  HR: 98 (07-13-20 @ 22:44) (98 - 98)  BP: 148/84 (07-13-20 @ 22:44) (148/84 - 148/84)  RR: 18 (07-13-20 @ 22:44) (18 - 18)  SpO2: --    GENERAL: patient appears well, no acute distress, appropriate, pleasant  EYES: sclera clear, no exudates  ENMT: oropharynx clear without erythema, no exudates, moist mucous membranes  NECK: supple, soft, no thyromegaly noted  LUNGS: good air entry bilaterally, clear to auscultation, symmetric breath sounds, no wheezing or rhonchi appreciated  HEART: soft S1/S2, regular rate and rhythm, no murmurs noted, no lower extremity edema  GASTROINTESTINAL: abdomen is soft, nontender, nondistended, normoactive bowel sounds, no palpable masses  GENITOURINARY: Left scrotum significantly swollen, indurated, nonerythematous, with palpable enlarged epididymus, R scrotum normal appearing, penis appears normal with no rash or discharge  INTEGUMENT: good skin turgor, no lesions noted  MUSCULOSKELETAL: no clubbing or cyanosis, no obvious deformity  NEUROLOGIC: awake, alert, oriented x3, good muscle tone in 4 extremities, no obvious sensory deficits

## 2020-07-13 NOTE — ED PROVIDER NOTE - CLINICAL SUMMARY MEDICAL DECISION MAKING FREE TEXT BOX
Lower abd pain and L sided testicular pain - ? started after lifting. now with fever - palpable mass on exam. Check labs, CT, US, IVF, likely TBA

## 2020-07-13 NOTE — H&P ADULT - PROBLEM SELECTOR PLAN 2
- CT abdomen/pelvis w/IV cont: partially imaged left scrotal wall thickening, better visualized on scrotal ultrasound of same date.  - US testicles: Findings suggestive of left epididymoorchitis with increased flow to left testis and left epididymis. There is associated complex hydrocele  - Given Levaquin 500 mg IV x1, IV NS 1L bolus x1, Morphine 4 mg IVP x1, Zofran 4 mg IVP x1. - CT abdomen/pelvis w/IV cont: partially imaged left scrotal wall thickening, better visualized on scrotal ultrasound of same date.  - US testicles: Findings suggestive of left epididymoorchitis with increased flow to left testis and left epididymis. There is associated complex hydrocele  - Given Levaquin 500 mg IV x1, IV NS 1L bolus x1, Morphine 4 mg IVP x1, Zofran 4 mg IVP x1 at [facility]  -Continue medications as above  -urology (Dr. Haynes) consulted, f/u recs

## 2020-07-13 NOTE — H&P ADULT - NSHPSOCIALHISTORY_GEN_ALL_CORE
Marital Status:  (   )    (   ) Single    (   )    (  )   Lives with: (  ) alone  (  ) children   (  ) spouse   (  ) parents  (  ) other  Recent Travel: No recent travel  Occupation:  Mobility:   Substance Use (street drugs): ( x ) never used  (  ) other:  Tobacco Usage:  ( x  ) never smoked   (   ) former smoker   (   ) current smoker  (     ) pack year  Alcohol Usage: None

## 2020-07-14 LAB
ALBUMIN SERPL ELPH-MCNC: 3.2 G/DL — LOW (ref 3.3–5)
ALP SERPL-CCNC: 51 U/L — SIGNIFICANT CHANGE UP (ref 40–120)
ALT FLD-CCNC: 32 U/L — SIGNIFICANT CHANGE UP (ref 12–78)
ANION GAP SERPL CALC-SCNC: 5 MMOL/L — SIGNIFICANT CHANGE UP (ref 5–17)
AST SERPL-CCNC: 21 U/L — SIGNIFICANT CHANGE UP (ref 15–37)
BASOPHILS # BLD AUTO: 0 K/UL — SIGNIFICANT CHANGE UP (ref 0–0.2)
BASOPHILS NFR BLD AUTO: 0 % — SIGNIFICANT CHANGE UP (ref 0–2)
BILIRUB SERPL-MCNC: 0.9 MG/DL — SIGNIFICANT CHANGE UP (ref 0.2–1.2)
BUN SERPL-MCNC: 11 MG/DL — SIGNIFICANT CHANGE UP (ref 7–23)
CALCIUM SERPL-MCNC: 8.2 MG/DL — LOW (ref 8.5–10.1)
CHLORIDE SERPL-SCNC: 105 MMOL/L — SIGNIFICANT CHANGE UP (ref 96–108)
CO2 SERPL-SCNC: 26 MMOL/L — SIGNIFICANT CHANGE UP (ref 22–31)
CREAT SERPL-MCNC: 0.94 MG/DL — SIGNIFICANT CHANGE UP (ref 0.5–1.3)
EOSINOPHIL # BLD AUTO: 0 K/UL — SIGNIFICANT CHANGE UP (ref 0–0.5)
EOSINOPHIL NFR BLD AUTO: 0 % — SIGNIFICANT CHANGE UP (ref 0–6)
GLUCOSE SERPL-MCNC: 89 MG/DL — SIGNIFICANT CHANGE UP (ref 70–99)
HCT VFR BLD CALC: 41.2 % — SIGNIFICANT CHANGE UP (ref 39–50)
HGB BLD-MCNC: 13.3 G/DL — SIGNIFICANT CHANGE UP (ref 13–17)
LYMPHOCYTES # BLD AUTO: 1.31 K/UL — SIGNIFICANT CHANGE UP (ref 1–3.3)
LYMPHOCYTES # BLD AUTO: 6 % — LOW (ref 13–44)
MCHC RBC-ENTMCNC: 27.9 PG — SIGNIFICANT CHANGE UP (ref 27–34)
MCHC RBC-ENTMCNC: 32.3 GM/DL — SIGNIFICANT CHANGE UP (ref 32–36)
MCV RBC AUTO: 86.4 FL — SIGNIFICANT CHANGE UP (ref 80–100)
MONOCYTES # BLD AUTO: 1.74 K/UL — HIGH (ref 0–0.9)
MONOCYTES NFR BLD AUTO: 8 % — SIGNIFICANT CHANGE UP (ref 2–14)
NEUTROPHILS # BLD AUTO: 18.49 K/UL — HIGH (ref 1.8–7.4)
NEUTROPHILS NFR BLD AUTO: 79 % — HIGH (ref 43–77)
NRBC # BLD: SIGNIFICANT CHANGE UP /100 WBCS (ref 0–0)
PLATELET # BLD AUTO: 218 K/UL — SIGNIFICANT CHANGE UP (ref 150–400)
POTASSIUM SERPL-MCNC: 4 MMOL/L — SIGNIFICANT CHANGE UP (ref 3.5–5.3)
POTASSIUM SERPL-SCNC: 4 MMOL/L — SIGNIFICANT CHANGE UP (ref 3.5–5.3)
PROT SERPL-MCNC: 7.5 G/DL — SIGNIFICANT CHANGE UP (ref 6–8.3)
RBC # BLD: 4.77 M/UL — SIGNIFICANT CHANGE UP (ref 4.2–5.8)
RBC # FLD: 12.8 % — SIGNIFICANT CHANGE UP (ref 10.3–14.5)
SARS-COV-2 IGG SERPL QL IA: POSITIVE
SARS-COV-2 IGM SERPL IA-ACNC: 5.99 INDEX — HIGH
SARS-COV-2 RNA SPEC QL NAA+PROBE: SIGNIFICANT CHANGE UP
SODIUM SERPL-SCNC: 136 MMOL/L — SIGNIFICANT CHANGE UP (ref 135–145)
TSH SERPL-MCNC: 4.94 UIU/ML — HIGH (ref 0.36–3.74)
WBC # BLD: 21.75 K/UL — HIGH (ref 3.8–10.5)
WBC # FLD AUTO: 21.75 K/UL — HIGH (ref 3.8–10.5)

## 2020-07-14 PROCEDURE — 99233 SBSQ HOSP IP/OBS HIGH 50: CPT | Mod: GC

## 2020-07-14 RX ORDER — OXYCODONE AND ACETAMINOPHEN 5; 325 MG/1; MG/1
2 TABLET ORAL EVERY 6 HOURS
Refills: 0 | Status: DISCONTINUED | OUTPATIENT
Start: 2020-07-14 | End: 2020-07-16

## 2020-07-14 RX ORDER — OXYCODONE AND ACETAMINOPHEN 5; 325 MG/1; MG/1
1 TABLET ORAL EVERY 6 HOURS
Refills: 0 | Status: DISCONTINUED | OUTPATIENT
Start: 2020-07-14 | End: 2020-07-16

## 2020-07-14 RX ORDER — CEFTRIAXONE 500 MG/1
1000 INJECTION, POWDER, FOR SOLUTION INTRAMUSCULAR; INTRAVENOUS EVERY 24 HOURS
Refills: 0 | Status: DISCONTINUED | OUTPATIENT
Start: 2020-07-14 | End: 2020-07-16

## 2020-07-14 RX ORDER — ACETAMINOPHEN 500 MG
650 TABLET ORAL EVERY 6 HOURS
Refills: 0 | Status: DISCONTINUED | OUTPATIENT
Start: 2020-07-14 | End: 2020-07-15

## 2020-07-14 RX ORDER — PIPERACILLIN AND TAZOBACTAM 4; .5 G/20ML; G/20ML
3.38 INJECTION, POWDER, LYOPHILIZED, FOR SOLUTION INTRAVENOUS ONCE
Refills: 0 | Status: COMPLETED | OUTPATIENT
Start: 2020-07-14 | End: 2020-07-14

## 2020-07-14 RX ADMIN — PIPERACILLIN AND TAZOBACTAM 200 GRAM(S): 4; .5 INJECTION, POWDER, LYOPHILIZED, FOR SOLUTION INTRAVENOUS at 09:32

## 2020-07-14 RX ADMIN — ENOXAPARIN SODIUM 40 MILLIGRAM(S): 100 INJECTION SUBCUTANEOUS at 11:26

## 2020-07-14 RX ADMIN — Medication 650 MILLIGRAM(S): at 05:42

## 2020-07-14 RX ADMIN — CEFTRIAXONE 100 MILLIGRAM(S): 500 INJECTION, POWDER, FOR SOLUTION INTRAMUSCULAR; INTRAVENOUS at 11:27

## 2020-07-14 NOTE — CONSULT NOTE ADULT - PROBLEM SELECTOR RECOMMENDATION 9
as discussed with team and Dr Maximus elizabeth about the age of 49 the microbiology of this process changes from a high incidence of GC/Chlamydia to E coli and other GNR/urinary pathogens including but rarely pseudomonas. Presentation here is more suggestive of urinary pathogen based. Recommend:  send off urine for culture, urine for GC/Chlamydia, de-escalate abx to  Ceftriaxone 1 gram IV Q-day with recs on specific abx and duration based on any revealing micro

## 2020-07-14 NOTE — PROGRESS NOTE ADULT - PROBLEM SELECTOR PLAN 2
- CT abdomen/pelvis w/IV cont: left scrotal wall thickening  - US testicles: Findings suggestive of left epididymoorchitis w/ associated complex hydrocele  - Given Levaquin 500 mg IV x1, IV NS 1L bolus x1, Morphine 4 mg IVP x1, Zofran 4 mg IVP x1 at [facility]  -Continue medications as above  -ID (Alessandro) following: ordered GC, ceftriaxone IV.  -urology (Dr. Haynes) consulted, f/u recs -chronic pt states he has not taken Synthroid for over 1 year due to insurance issues  -TSH - 4.94 - f/u free T4 in AM

## 2020-07-14 NOTE — PROGRESS NOTE ADULT - PROBLEM SELECTOR PLAN 3
-chronic pt states he has not taken Synthroid for over 1 year due to insurance issues  -TSH increased. lovenox 40 subq daily for VTE chemoprophylaxis for now until patient starts ambulating

## 2020-07-14 NOTE — CONSULT NOTE ADULT - PROBLEM SELECTOR RECOMMENDATION 2
would restart Rx    Thank you for consulting us and involving us in the management of this most interesting and challenging case.     We will follow along in the care of this patient.
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
## 2020-07-14 NOTE — ED ADULT NURSE REASSESSMENT NOTE - NS ED NURSE REASSESS COMMENT FT1
Pt is Aox3 in NAD. Pt denies complaint.  IV clean dry and intact Pt OOB independently. Safety maintained, Bed locked in lowest position. Call bell in reach. Pt awaiting bed placement.

## 2020-07-14 NOTE — PROGRESS NOTE ADULT - SUBJECTIVE AND OBJECTIVE BOX
Patient is a 49y old  Male who presents with a chief complaint of Left epididymoorchitis (14 Jul 2020 09:24)    The patient was admitted for meeting sepsis criteria.    INTERVAL HPI/OVERNIGHT EVENTS: The patient was seen and examined this morning while laying in bed eating breakfast. He reports that he marsh snot have any fever/chills/myalgias/n/v/dysuria/hematuria/hematospermia/painful ejaculation/weakness. He does continue to have pain and tenderness in the left scrotal region.     MEDICATIONS  (STANDING):  cefTRIAXone   IVPB 1000 milliGRAM(s) IV Intermittent every 24 hours  enoxaparin Injectable 40 milliGRAM(s) SubCutaneous daily    MEDICATIONS  (PRN):  acetaminophen   Tablet .. 650 milliGRAM(s) Oral every 6 hours PRN Temp greater or equal to 38C (100.4F), Mild Pain (1 - 3)  oxycodone    5 mG/acetaminophen 325 mG 1 Tablet(s) Oral every 6 hours PRN Moderate Pain (4 - 6)  oxycodone    5 mG/acetaminophen 325 mG 2 Tablet(s) Oral every 6 hours PRN Severe Pain (7 - 10)      Allergies    No Known Drug Allergies  shellfish (Anaphylaxis; Urticaria; Short breath)    Intolerances        REVIEW OF SYSTEMS:  CONSTITUTIONAL: No fever or chills  HEENT: No headache  RESPIRATORY: No cough, wheezing, or shortness of breath  CARDIOVASCULAR: No chest pain, palpitations  GASTROINTESTINAL: No abd pain, nausea, vomiting, or diarrhea  GENITOURINARY: No dysuria, frequency, or hematuria  NEUROLOGICAL: No focal weakness or dizziness  MUSCULOSKELETAL: No myalgias     Vital Signs Last 24 Hrs  T(C): 36.8 (14 Jul 2020 05:20), Max: 37.7 (13 Jul 2020 22:44)  T(F): 98.2 (14 Jul 2020 05:20), Max: 99.9 (13 Jul 2020 22:44)  HR: 96 (14 Jul 2020 05:20) (96 - 105)  BP: 143/84 (14 Jul 2020 05:20) (142/90 - 148/84)  BP(mean): --  RR: 19 (14 Jul 2020 05:20) (18 - 19)  SpO2: 98% (14 Jul 2020 05:20) (97% - 98%)    PHYSICAL EXAM:  GENERAL: NAD, awake and alert, cooperative.   HEENT:  anicteric, moist mucous membranes  SKIN: Intact, no lesions noted.   CHEST/LUNG:  CTA b/l, no rales, wheezes, or rhonchi  HEART:  RRR, S1, S2  ABDOMEN:  soft, nontender, nondistended, no suprapubic tenderness  : No inguinal tenderness/lymphadenopathy, + left scrotal swelling and TTP. Left testicle swollen. Unable to assess erythema. No urethral discharge. + left epididymis TTP. Right testicle NTTP, non swollen, right scrotum is not swollen. Sensation is intact.   EXTREMITIES: no edema, cyanosis, or calf tenderness, no gross deformities or lesions noted.   NERVOUS SYSTEM: answers questions and follows commands appropriately    LABS:                        13.3   21.75 )-----------( 218      ( 14 Jul 2020 08:12 )             41.2     CBC Full  -  ( 14 Jul 2020 08:12 )  WBC Count : 21.75 K/uL  Hemoglobin : 13.3 g/dL  Hematocrit : 41.2 %  Platelet Count - Automated : 218 K/uL  Mean Cell Volume : 86.4 fl  Mean Cell Hemoglobin : 27.9 pg  Mean Cell Hemoglobin Concentration : 32.3 gm/dL  Auto Neutrophil # : 18.49 K/uL  Auto Lymphocyte # : 1.31 K/uL  Auto Monocyte # : 1.74 K/uL  Auto Eosinophil # : 0.00 K/uL  Auto Basophil # : 0.00 K/uL  Auto Neutrophil % : 79.0 %  Auto Lymphocyte % : 6.0 %  Auto Monocyte % : 8.0 %  Auto Eosinophil % : 0.0 %  Auto Basophil % : 0.0 %    14 Jul 2020 08:12    136    |  105    |  11     ----------------------------<  89     4.0     |  26     |  0.94     Ca    8.2        14 Jul 2020 08:12    TPro  7.5    /  Alb  3.2    /  TBili  0.9    /  DBili  x      /  AST  21     /  ALT  32     /  AlkPhos  51     14 Jul 2020 08:12        CAPILLARY BLOOD GLUCOSE              RADIOLOGY & ADDITIONAL TESTS:    Personally reviewed.     Consultant(s) Notes Reviewed:  [x] YES  [ ] NO Patient is a 49y old  Male who presents with a chief complaint of Left epididymoorchitis (14 Jul 2020 09:24)    The patient was admitted for meeting sepsis poa  criteria.    INTERVAL HPI/OVERNIGHT EVENTS: The patient was seen and examined this morning while laying in bed eating breakfast. He reports that he marsh snot have any fever/chills/myalgias/n/v/dysuria/hematuria/hematospermia/painful ejaculation/weakness. He does continue to have pain and tenderness in the left scrotal region.           REVIEW OF SYSTEMS:  CONSTITUTIONAL: No fever or chills  HEENT: No headache  RESPIRATORY: No cough, wheezing, or shortness of breath  CARDIOVASCULAR: No chest pain, palpitations  GASTROINTESTINAL: No abd pain, nausea, vomiting, or diarrhea  GENITOURINARY: No dysuria, frequency, or hematuria, pain scrotal area+  NEUROLOGICAL: No focal weakness or dizziness  MUSCULOSKELETAL: No myalgias     Vital Signs Last 24 Hrs  T(C): 36.8 (14 Jul 2020 05:20), Max: 37.7 (13 Jul 2020 22:44)  T(F): 98.2 (14 Jul 2020 05:20), Max: 99.9 (13 Jul 2020 22:44)  HR: 96 (14 Jul 2020 05:20) (96 - 105)  BP: 143/84 (14 Jul 2020 05:20) (142/90 - 148/84)  BP(mean): --  RR: 19 (14 Jul 2020 05:20) (18 - 19)  SpO2: 98% (14 Jul 2020 05:20) (97% - 98%)    PHYSICAL EXAM:  GENERAL: NAD, awake and alert, cooperative.   HEENT:  anicteric, moist mucous membranes  SKIN: Intact, no lesions noted.   CHEST/LUNG:  CTA b/l, no rales, wheezes, or rhonchi  HEART:  RRR, S1, S2  ABDOMEN:  soft, nontender, nondistended, no suprapubic tenderness  : No inguinal tenderness/lymphadenopathy, + left scrotal swelling and TTP. Left testicle swollen. Unable to assess erythema. No urethral discharge. + left epididymis TTP. Right testicle NTTP, non swollen, right scrotum is not swollen. Sensation is intact.   EXTREMITIES: no edema, cyanosis, or calf tenderness, no gross deformities or lesions  NERVOUS SYSTEM: aao /3 , sensory intact , motor 5/5 intact     MEDICATIONS  (STANDING):  cefTRIAXone   IVPB 1000 milliGRAM(s) IV Intermittent every 24 hours  enoxaparin Injectable 40 milliGRAM(s) SubCutaneous daily    MEDICATIONS  (PRN):  acetaminophen   Tablet .. 650 milliGRAM(s) Oral every 6 hours PRN Temp greater or equal to 38C (100.4F), Mild Pain (1 - 3)  oxycodone    5 mG/acetaminophen 325 mG 1 Tablet(s) Oral every 6 hours PRN Moderate Pain (4 - 6)  oxycodone    5 mG/acetaminophen 325 mG 2 Tablet(s) Oral every 6 hours PRN Severe Pain (7 - 10)      LABS:                        13.3   21.75 )-----------( 218      ( 14 Jul 2020 08:12 )             41.2     CBC Full  -  ( 14 Jul 2020 08:12 )  WBC Count : 21.75 K/uL  Hemoglobin : 13.3 g/dL  Hematocrit : 41.2 %  Platelet Count - Automated : 218 K/uL  Mean Cell Volume : 86.4 fl  Mean Cell Hemoglobin : 27.9 pg  Mean Cell Hemoglobin Concentration : 32.3 gm/dL  Auto Neutrophil # : 18.49 K/uL  Auto Lymphocyte # : 1.31 K/uL  Auto Monocyte # : 1.74 K/uL  Auto Eosinophil # : 0.00 K/uL  Auto Basophil # : 0.00 K/uL  Auto Neutrophil % : 79.0 %  Auto Lymphocyte % : 6.0 %  Auto Monocyte % : 8.0 %  Auto Eosinophil % : 0.0 %  Auto Basophil % : 0.0 %    14 Jul 2020 08:12    136    |  105    |  11     ----------------------------<  89     4.0     |  26     |  0.94     Ca    8.2        14 Jul 2020 08:12    TPro  7.5    /  Alb  3.2    /  TBili  0.9    /  DBili  x      /  AST  21     /  ALT  32     /  AlkPhos  51     14 Jul 2020 08:12                    RADIOLOGY & ADDITIONAL TESTS:    Personally reviewed.     Consultant(s) Notes Reviewed:  [x] YES  [ ] NO

## 2020-07-14 NOTE — CONSULT NOTE ADULT - ASSESSMENT
full consult to follow 49 year old male PMHx hypothyroidism presents as transfer from [place] c/o left groin pain and swelling x5 days. 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    different MRN form [place]  US with Left testis suggestive of left epididymoorchitis with increased flow to left testis and left epididymis. There is associated complex hydrocele

## 2020-07-14 NOTE — PROGRESS NOTE ADULT - ASSESSMENT
49 year old male PMHx hypothyroidism presents as transfer from [place] c/o left groin pain and swelling x5 days admitted for sepsis likely 2/2 left epididymoorchitis. 49 year old male PMHx hypothyroidism presents as transfer from [place] c/o left groin pain and swelling x5 days admitted for sepsis likely 2/2 left epididymoorchitis. 49 year old male PMHx hypothyroidism presents as transfer from [place] c/o left groin pain and swelling x5 days admitted for sepsis  poa likely 2/2 left epididymoorchitis.

## 2020-07-14 NOTE — PROGRESS NOTE ADULT - PROBLEM SELECTOR PLAN 1
-Patient met sepsis criteria on admission: T 100.5  WBC 14.48, likely 2/2 epididymoorchitis   - Given Levaquin 500 mg IV x1, IV NS 1L bolus x1, Morphine 4 mg IVP x1, Zofran 4 mg IVP x1 at [place] [facility]  -Continue PO Levaquin 500 for 10 days total  -Continue pain control tylenol PO for mild, Percocet 5/325 q 6 for moderate, and Percocet 10/325 q 6 for severe.  -tylenol q 6 PRN for fevers  - Lactate wnl, continue PO hydration  - F/u BCx x2, Trend WBC -Patient met sepsis criteria on admission: T 100.5  WBC 14.48, likely 2/2 epididymoorchitis   - CT abdomen/pelvis w/IV cont: left scrotal wall thickening  - US testicles: Findings suggestive of left epididymoorchitis w/ associated complex hydrocele  - Given Levaquin 500 mg IV x1, IV NS 1L bolus x1, Morphine 4 mg IVP x1, Zofran 4 mg IVP x1 at [place] [facility]  - start Ceftriaxone 1 gram IV Q-day  -Continue pain control tylenol PO for mild, Percocet 5/325 q 6 for moderate, and Percocet 10/325 q 6 for severe.  - tylenol q 6 PRN for fevers  - f/u urine GC/Chlamydia and UC  - F/u BCx x2, Trend WBC  - ID, Dr. Francois following  - Urology (Dr. Haynes) consulted, f/u recs -Patient met sepsis  poa criteria on admission: T 100.5  WBC 14.48, likely 2/2 epididymoorchitis   - CT abdomen/pelvis w/IV cont: left scrotal wall thickening  - US testicles: Findings suggestive of left epididymoorchitis w/ associated complex hydrocele  - Given Levaquin 500 mg IV x1, IV NS 1L bolus x1, Morphine 4 mg IVP x1, Zofran 4 mg IVP x1 at [place] [facility]  - start Ceftriaxone 1 gram IV Q-day  -Continue pain control tylenol PO for mild, Percocet 5/325 q 6 for moderate, and Percocet 10/325 q 6 for severe.  - tylenol q 6 PRN for fevers  - f/u urine GC/Chlamydia and UC  - F/u BCx x2, Trend WBC  - ID, Dr. Francois following  - Urology (Dr. Haynes) consulted,

## 2020-07-15 ENCOUNTER — TRANSCRIPTION ENCOUNTER (OUTPATIENT)
Age: 50
End: 2020-07-15

## 2020-07-15 LAB
ALBUMIN SERPL ELPH-MCNC: 2.8 G/DL — LOW (ref 3.3–5)
ALP SERPL-CCNC: 58 U/L — SIGNIFICANT CHANGE UP (ref 40–120)
ALT FLD-CCNC: 27 U/L — SIGNIFICANT CHANGE UP (ref 12–78)
ANION GAP SERPL CALC-SCNC: 4 MMOL/L — LOW (ref 5–17)
AST SERPL-CCNC: 15 U/L — SIGNIFICANT CHANGE UP (ref 15–37)
BILIRUB SERPL-MCNC: 0.2 MG/DL — SIGNIFICANT CHANGE UP (ref 0.2–1.2)
BUN SERPL-MCNC: 17 MG/DL — SIGNIFICANT CHANGE UP (ref 7–23)
CALCIUM SERPL-MCNC: 8.3 MG/DL — LOW (ref 8.5–10.1)
CHLORIDE SERPL-SCNC: 110 MMOL/L — HIGH (ref 96–108)
CO2 SERPL-SCNC: 27 MMOL/L — SIGNIFICANT CHANGE UP (ref 22–31)
CREAT SERPL-MCNC: 0.97 MG/DL — SIGNIFICANT CHANGE UP (ref 0.5–1.3)
GLUCOSE SERPL-MCNC: 104 MG/DL — HIGH (ref 70–99)
HCT VFR BLD CALC: 40.9 % — SIGNIFICANT CHANGE UP (ref 39–50)
HGB BLD-MCNC: 13 G/DL — SIGNIFICANT CHANGE UP (ref 13–17)
MCHC RBC-ENTMCNC: 27.8 PG — SIGNIFICANT CHANGE UP (ref 27–34)
MCHC RBC-ENTMCNC: 31.8 GM/DL — LOW (ref 32–36)
MCV RBC AUTO: 87.6 FL — SIGNIFICANT CHANGE UP (ref 80–100)
NRBC # BLD: 0 /100 WBCS — SIGNIFICANT CHANGE UP (ref 0–0)
PLATELET # BLD AUTO: 218 K/UL — SIGNIFICANT CHANGE UP (ref 150–400)
POTASSIUM SERPL-MCNC: 3.9 MMOL/L — SIGNIFICANT CHANGE UP (ref 3.5–5.3)
POTASSIUM SERPL-SCNC: 3.9 MMOL/L — SIGNIFICANT CHANGE UP (ref 3.5–5.3)
PROT SERPL-MCNC: 7.4 G/DL — SIGNIFICANT CHANGE UP (ref 6–8.3)
RBC # BLD: 4.67 M/UL — SIGNIFICANT CHANGE UP (ref 4.2–5.8)
RBC # FLD: 13 % — SIGNIFICANT CHANGE UP (ref 10.3–14.5)
SODIUM SERPL-SCNC: 141 MMOL/L — SIGNIFICANT CHANGE UP (ref 135–145)
T3FREE SERPL-MCNC: 1.95 PG/ML — SIGNIFICANT CHANGE UP (ref 1.8–4.6)
T4 FREE SERPL-MCNC: 0.5 NG/DL — LOW (ref 0.9–1.8)
WBC # BLD: 16.84 K/UL — HIGH (ref 3.8–10.5)
WBC # FLD AUTO: 16.84 K/UL — HIGH (ref 3.8–10.5)

## 2020-07-15 PROCEDURE — 99233 SBSQ HOSP IP/OBS HIGH 50: CPT

## 2020-07-15 RX ORDER — LEVOTHYROXINE SODIUM 125 MCG
75 TABLET ORAL DAILY
Refills: 0 | Status: DISCONTINUED | OUTPATIENT
Start: 2020-07-15 | End: 2020-07-16

## 2020-07-15 RX ORDER — LEVOTHYROXINE SODIUM 125 MCG
1 TABLET ORAL
Qty: 90 | Refills: 0
Start: 2020-07-15 | End: 2020-10-12

## 2020-07-15 RX ORDER — IBUPROFEN 200 MG
1 TABLET ORAL
Qty: 0 | Refills: 0 | DISCHARGE
Start: 2020-07-15

## 2020-07-15 RX ORDER — IBUPROFEN 200 MG
400 TABLET ORAL THREE TIMES A DAY
Refills: 0 | Status: DISCONTINUED | OUTPATIENT
Start: 2020-07-15 | End: 2020-07-16

## 2020-07-15 RX ADMIN — CEFTRIAXONE 100 MILLIGRAM(S): 500 INJECTION, POWDER, FOR SOLUTION INTRAMUSCULAR; INTRAVENOUS at 11:13

## 2020-07-15 RX ADMIN — ENOXAPARIN SODIUM 40 MILLIGRAM(S): 100 INJECTION SUBCUTANEOUS at 11:13

## 2020-07-15 NOTE — PROGRESS NOTE ADULT - ASSESSMENT
49 year old male PMHx hypothyroidism presents as transfer from [place] c/o left groin pain and swelling x5 days admitted for sepsis poa likely 2/2 left epididymoorchitis.

## 2020-07-15 NOTE — PROGRESS NOTE ADULT - SUBJECTIVE AND OBJECTIVE BOX
infectious diseases progress note:    ARIANA VILLAVICENCIO is a 49y y. o. Male patient    No concerning overnight events, pt very anxious to leave    Allergies    No Known Drug Allergies  shellfish (Anaphylaxis; Urticaria; Short breath)    Intolerances        ANTIBIOTICS/RELEVANT:  antimicrobials  cefTRIAXone   IVPB 1000 milliGRAM(s) IV Intermittent every 24 hours    immunologic:    OTHER:  enoxaparin Injectable 40 milliGRAM(s) SubCutaneous daily  ibuprofen  Tablet. 400 milliGRAM(s) Oral three times a day PRN  oxycodone    5 mG/acetaminophen 325 mG 1 Tablet(s) Oral every 6 hours PRN  oxycodone    5 mG/acetaminophen 325 mG 2 Tablet(s) Oral every 6 hours PRN      Objective:  Vital Signs Last 24 Hrs  T(C): 36.9 (15 Jul 2020 04:55), Max: 37.4 (14 Jul 2020 21:15)  T(F): 98.5 (15 Jul 2020 04:55), Max: 99.3 (14 Jul 2020 21:15)  HR: 91 (15 Jul 2020 04:55) (91 - 96)  BP: 138/85 (15 Jul 2020 04:55) (119/80 - 142/88)  BP(mean): --  RR: 17 (15 Jul 2020 04:55) (17 - 18)  SpO2: 99% (15 Jul 2020 04:55) (96% - 99%)    T(C): 36.9 (07-15-20 @ 04:55), Max: 37.7 (07-13-20 @ 22:44)  T(C): 36.9 (07-15-20 @ 04:55), Max: 37.7 (07-13-20 @ 22:44)  T(C): 36.9 (07-15-20 @ 04:55), Max: 37.7 (07-13-20 @ 22:44)    PHYSICAL EXAM:  HEENT: NC atraumatic  Neck: supple  Respiratory: no accessory muscle use, breathing comfortably  Cardiovascular: distant  Gastrointestinal: normal appearing, nondistended  Extremities: no clubbing, no cyanosis,  Genital: no sig change in exam      LABS:                          13.0   16.84 )-----------( 218      ( 15 Jul 2020 08:11 )             40.9       16.84 07-15 @ 08:11  21.75 07-14 @ 08:12      07-15    141  |  110<H>  |  17  ----------------------------<  104<H>  3.9   |  27  |  0.97    Ca    8.3<L>      15 Jul 2020 08:11    TPro  7.4  /  Alb  2.8<L>  /  TBili  0.2  /  DBili  x   /  AST  15  /  ALT  27  /  AlkPhos  58  07-15      Creatinine, Serum: 0.97 mg/dL (07-15-20 @ 08:11)  Creatinine, Serum: 0.94 mg/dL (07-14-20 @ 08:12)                INFLAMMATORY MARKERS  Auto Neutrophil #: 18.49 K/uL (07-14-20 @ 08:12)  Auto Lymphocyte #: 1.31 K/uL (07-14-20 @ 08:12)    Auto Eosinophil #: 0.00 K/uL (07-14-20 @ 08:12)      MICROBIOLOGY:              RADIOLOGY & ADDITIONAL STUDIES:

## 2020-07-15 NOTE — PROGRESS NOTE ADULT - ASSESSMENT
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    different MRN form [place]  US with Left testis suggestive of left epididymoorchitis with increased flow to left testis and left epididymis. There is associated complex hydrocele

## 2020-07-15 NOTE — PROGRESS NOTE ADULT - PROBLEM SELECTOR PLAN 1
I appreciate that patient wants to be discharged but with sig leukocytosis no micro to guide us and not clear that a urine was send for culture looking at both [facility] and [facility] charts not clear that dc is wise for today as opposed to continued IV abx with potential dc 7/16 on Vantin 200mg PO BID with last day 7/22 if no revealing micro and adding azithromycin or doxycycline if GC/chlamydia positive

## 2020-07-15 NOTE — PROGRESS NOTE ADULT - PROBLEM SELECTOR PLAN 2
-chronic pt states he has not taken Synthroid for over 1 year due to insurance issues  -TSH - 4.94 - f/u free T4 in AM

## 2020-07-15 NOTE — PROGRESS NOTE ADULT - PROBLEM SELECTOR PLAN 1
-Patient met sepsis  poa criteria on admission: T 100.5  WBC 14.48, likely 2/2 epididymoorchitis   - CT abdomen/pelvis w/IV cont: left scrotal wall thickening  - US testicles: Findings suggestive of left epididymoorchitis w/ associated complex hydrocele  - Given Levaquin 500 mg IV x1, IV NS 1L bolus x1, Morphine 4 mg IVP x1, Zofran 4 mg IVP x1 at [place] [facility].   - start Ceftriaxone 1 gram IV Q-day, 1 dose of zosyn given.  -Continue pain control tylenol PO for mild, Percocet 5/325 q 6 for moderate, and Percocet 10/325 q 6 for severe.  - tylenol q 6 PRN for fevers  - f/u urine GC/Chlamydia and UC  - F/u BCx x2, Trend WBC  - ID, Dr. Francois following  - Urology following: continue ab, added ibuprofen for pain/swelling.  -Start d/c planning.

## 2020-07-15 NOTE — DISCHARGE NOTE PROVIDER - HOSPITAL COURSE
HPI:    49 year old male PMHx hypothyroidism presents as transfer from [place] c/o left groin pain and swelling x5 days. He states that on Tuesday he lifted something very heavy and subsequently Weds developed scrotal pain. He denies hearing a pop or any noise but he did feel a pulling in his left groin. He states the pain is a 10/10 and is localized to his left scrotal area, denies radiating pain to his abdomen. He states when he holds up his scrotum the sensation is different but still just as painful. He has been taking Aleve OTC for the pain which was helping last week but did not relieve his symptoms today. He took Aleve at 3 pm today. Denies recent illness, sick contacts, recent travel. He went to his PCP today and was sent to the ED from there. He felt feverish today but was afebrile upon arrival to ED. He denies visual changes, H/A, CP, SOB, cough, dizziness, syncope, abd pain, N, V, D, urinary urigency frequency or dysuria, and blood in urine. He has a hx of hypothyroidism but has not taken levothyroxine for about 1 year due to loss of insurance.         On admission: Vitals: T 100.5  /85 RR 16 SpO2 96% on RA    Labs: WBC 14.48, Neutrophil 12.53 (86.6%), Lymphocyte 7.6%, Glu 105    UA: positive for nitrites, trace blood, many bacteria     CT abdomen/pelvis w/IV cont: partially imaged left scrotal wall thickening, better visualized on scrotal ultrasound of same date.    US testicles: Findings suggestive of left epididymoorchitis with increased flow to left testis and left epididymis. There is associated complex hydrocele    EKG: Sinus tachy 109 BPM, borderline LVH, nonspecific T wave abnormality    Given Levaquin 500 mg IV x1, IV NS 1L bolus x1, Morphine 4 mg IVP x1, Zofran 4 mg IVP x1 (13 Jul 2020 23:32)        Vital Signs Last 24 Hrs    T(C): 36.9 (15 Jul 2020 04:55), Max: 37.4 (14 Jul 2020 21:15)    T(F): 98.5 (15 Jul 2020 04:55), Max: 99.3 (14 Jul 2020 21:15)    HR: 91 (15 Jul 2020 04:55) (91 - 96)    BP: 138/85 (15 Jul 2020 04:55) (119/80 - 142/88)    BP(mean): --    RR: 17 (15 Jul 2020 04:55) (17 - 18)    SpO2: 99% (15 Jul 2020 04:55) (96% - 99%)        PHYSICAL EXAM:    GENERAL: NAD, awake and alert, cooperative.     HEENT:  anicteric, moist mucous membranes    SKIN: Intact, no lesions noted.     CHEST/LUNG:  CTA b/l, no rales, wheezes, or rhonchi    HEART:  RRR, S1, S2    ABDOMEN:  soft, nontender, nondistended, no suprapubic tenderness    : No inguinal tenderness/lymphadenopathy, + left scrotal swelling (improving), mildly TTP. Left testicle swollen. Unable to assess erythema. No urethral discharge. No left epididymis TTP. Right testicle NTTP, non swollen, right scrotum is not swollen. Sensation is intact.     EXTREMITIES: no edema, cyanosis, or calf tenderness, no gross deformities or lesions    NERVOUS SYSTEM: aao /3 , sensory intact , motor 5/5 intact         CBC Full  -  ( 15 Jul 2020 08:11 )    WBC Count : 16.84 K/uL    RBC Count : 4.67 M/uL    Hemoglobin : 13.0 g/dL    Hematocrit : 40.9 %    Platelet Count - Automated : 218 K/uL    Mean Cell Volume : 87.6 fl    Mean Cell Hemoglobin : 27.8 pg    Mean Cell Hemoglobin Concentration : 31.8 gm/dL    Auto Neutrophil # : x    Auto Lymphocyte # : x    Auto Monocyte # : x    Auto Eosinophil # : x    Auto Basophil # : x    Auto Neutrophil % : x    Auto Lymphocyte % : x    Auto Monocyte % : x    Auto Eosinophil % : x    Auto Basophil % : x     07-15        141  |  110<H>  |  17    ----------------------------<  104<H>    3.9   |  27  |  0.97        Ca    8.3<L>      15 Jul 2020 08:11        TPro  7.4  /  Alb  2.8<L>  /  TBili  0.2  /  DBili  x   /  AST  15  /  ALT  27  /  AlkPhos  58  07-15            HOSPITAL COURSE:     In the ED the pt was found to have an infection of your right testicle. Labs showed a leukocytosis and UA showed sign of infection. The patient was given a dose of zosyn and started on levaquin and pain control. He was admitted to the hospital. ID was consulted who recommended ceftriaxone for antibiotic coverage. Urology was consulted, the recommended ibuprofen for pain/swelling and continued ab treatment. On exam 7/15 the pt reported decreased pain with palpation of the testicle and reduced swelling was appreciated.         CONSULTANTS:     Urology: Dr. Varner    ID: Dr. Francois    ---    TIME SPENT:    I, the attending physician, was physically present for the key portions of the evaluation and management (E/M) service provided. The total amount of time spent reviewing the hospital notes, laboratory values, imaging findings, assessing/counseling the patient, discussing with consultant physicians, social work, nursing staff was -- minutes        ---    Primary care provider was made aware of plan for discharge:      [  ] NO     [  ] YES HPI:    49 year old male PMHx hypothyroidism presents as transfer from [place] c/o left groin pain and swelling x5 days. He states that on Tuesday he lifted something very heavy and subsequently Weds developed scrotal pain. He denies hearing a pop or any noise but he did feel a pulling in his left groin. He states the pain is a 10/10 and is localized to his left scrotal area, denies radiating pain to his abdomen. He states when he holds up his scrotum the sensation is different but still just as painful. He has been taking Aleve OTC for the pain which was helping last week but did not relieve his symptoms today. He took Aleve at 3 pm today. Denies recent illness, sick contacts, recent travel. He went to his PCP today and was sent to the ED from there. He felt feverish today but was afebrile upon arrival to ED. He denies visual changes, H/A, CP, SOB, cough, dizziness, syncope, abd pain, N, V, D, urinary urigency frequency or dysuria, and blood in urine. He has a hx of hypothyroidism but has not taken levothyroxine for about 1 year due to loss of insurance.         On admission: Vitals: T 100.5  /85 RR 16 SpO2 96% on RA    Labs: WBC 14.48, Neutrophil 12.53 (86.6%), Lymphocyte 7.6%, Glu 105    UA: positive for nitrites, trace blood, many bacteria     CT abdomen/pelvis w/IV cont: partially imaged left scrotal wall thickening, better visualized on scrotal ultrasound of same date.    US testicles: Findings suggestive of left epididymoorchitis with increased flow to left testis and left epididymis. There is associated complex hydrocele    EKG: Sinus tachy 109 BPM, borderline LVH, nonspecific T wave abnormality    Given Levaquin 500 mg IV x1, IV NS 1L bolus x1, Morphine 4 mg IVP x1, Zofran 4 mg IVP x1 (13 Jul 2020 23:32)        Vital Signs Last 24 Hrs    T(C): 36.9 (15 Jul 2020 04:55), Max: 37.4 (14 Jul 2020 21:15)    T(F): 98.5 (15 Jul 2020 04:55), Max: 99.3 (14 Jul 2020 21:15)    HR: 91 (15 Jul 2020 04:55) (91 - 96)    BP: 138/85 (15 Jul 2020 04:55) (119/80 - 142/88)    BP(mean): --    RR: 17 (15 Jul 2020 04:55) (17 - 18)    SpO2: 99% (15 Jul 2020 04:55) (96% - 99%)        PHYSICAL EXAM:    GENERAL: NAD, awake and alert, cooperative.     HEENT:  anicteric, moist mucous membranes    SKIN: Intact, no lesions noted.     CHEST/LUNG:  CTA b/l, no rales, wheezes, or rhonchi    HEART:  RRR, S1, S2    ABDOMEN:  soft, nontender, nondistended, no suprapubic tenderness    : No inguinal tenderness/lymphadenopathy, + left scrotal swelling (improving), mildly TTP. Left testicle swollen. Unable to assess erythema. No urethral discharge. No left epididymis TTP. Right testicle NTTP, non swollen, right scrotum is not swollen. Sensation is intact.     EXTREMITIES: no edema, cyanosis, or calf tenderness, no gross deformities or lesions    NERVOUS SYSTEM: aao /3 , sensory intact , motor 5/5 intact         CBC Full  -  ( 15 Jul 2020 08:11 )    WBC Count : 16.84 K/uL    RBC Count : 4.67 M/uL    Hemoglobin : 13.0 g/dL    Hematocrit : 40.9 %    Platelet Count - Automated : 218 K/uL    Mean Cell Volume : 87.6 fl    Mean Cell Hemoglobin : 27.8 pg    Mean Cell Hemoglobin Concentration : 31.8 gm/dL    Auto Neutrophil # : x    Auto Lymphocyte # : x    Auto Monocyte # : x    Auto Eosinophil # : x    Auto Basophil # : x    Auto Neutrophil % : x    Auto Lymphocyte % : x    Auto Monocyte % : x    Auto Eosinophil % : x    Auto Basophil % : x     07-15        141  |  110<H>  |  17    ----------------------------<  104<H>    3.9   |  27  |  0.97        Ca    8.3<L>      15 Jul 2020 08:11        TPro  7.4  /  Alb  2.8<L>  /  TBili  0.2  /  DBili  x   /  AST  15  /  ALT  27  /  AlkPhos  58  07-15            HOSPITAL COURSE:     In the ED the pt was found to have an infection of your right testicle. Labs showed a leukocytosis and UA showed sign of infection. The patient was given a dose of zosyn and started on levaquin and pain control. He was admitted to the hospital. ID was consulted who recommended ceftriaxone for antibiotic coverage. Urology was consulted, the recommended ibuprofen for pain/swelling and continued ab treatment. On exam 7/15 the pt reported decreased pain with palpation of the testicle and reduced swelling was appreciated. The pt reported a hx of hypothyroid, TSH was elevated. We plan to d/c the pt with his last dose of levothyroxine and antibiotic therapy with plan to FU with primary care physician within the next week. From a medical standpoint, pt is stable to be discharged with instructions to return for care if he destabilizes.        CONSULTANTS:     Urology: Dr. Varner    ID: Dr. Francois    ---    TIME SPENT:    I, the attending physician, was physically present for the key portions of the evaluation and management (E/M) service provided. The total amount of time spent reviewing the hospital notes, laboratory values, imaging findings, assessing/counseling the patient, discussing with consultant physicians, social work, nursing staff was -- minutes        ---    Primary care provider was made aware of plan for discharge:      [  ] NO     [  ] YES HPI:    49 year old male PMHx hypothyroidism presents as transfer from [place] c/o left groin pain and swelling x5 days. He states that on Tuesday he lifted something very heavy and subsequently Weds developed scrotal pain. He denies hearing a pop or any noise but he did feel a pulling in his left groin. He states the pain is a 10/10 and is localized to his left scrotal area, denies radiating pain to his abdomen. He states when he holds up his scrotum the sensation is different but still just as painful. He has been taking Aleve OTC for the pain which was helping last week but did not relieve his symptoms today. He took Aleve at 3 pm today. Denies recent illness, sick contacts, recent travel. He went to his PCP today and was sent to the ED from there. He felt feverish today but was afebrile upon arrival to ED. He denies visual changes, H/A, CP, SOB, cough, dizziness, syncope, abd pain, N, V, D, urinary urigency frequency or dysuria, and blood in urine. He has a hx of hypothyroidism but has not taken levothyroxine for about 1 year due to loss of insurance.         On admission: Vitals: T 100.5  /85 RR 16 SpO2 96% on RA    Labs: WBC 14.48, Neutrophil 12.53 (86.6%), Lymphocyte 7.6%, Glu 105    UA: positive for nitrites, trace blood, many bacteria     CT abdomen/pelvis w/IV cont: partially imaged left scrotal wall thickening, better visualized on scrotal ultrasound of same date.    US testicles: Findings suggestive of left epididymoorchitis with increased flow to left testis and left epididymis. There is associated complex hydrocele    EKG: Sinus tachy 109 BPM, borderline LVH, nonspecific T wave abnormality    Given Levaquin 500 mg IV x1, IV NS 1L bolus x1, Morphine 4 mg IVP x1, Zofran 4 mg IVP x1 (13 Jul 2020 23:32)        Vital Signs Last 24 Hrs    T(C): 36.9 (16 Jul 2020 04:45), Max: 36.9 (16 Jul 2020 04:45)    T(F): 98.5 (16 Jul 2020 04:45), Max: 98.5 (16 Jul 2020 04:45)    HR: 73 (16 Jul 2020 04:45) (67 - 82)    BP: 137/88 (16 Jul 2020 04:45) (137/88 - 155/92)    BP(mean): --    RR: 17 (16 Jul 2020 04:45) (17 - 18)    SpO2: 97% (16 Jul 2020 04:45) (97% - 99%)        PHYSICAL EXAM:    GENERAL: NAD, awake and alert, cooperative.     HEENT:  anicteric, moist mucous membranes    SKIN: Intact, no lesions noted.     CHEST/LUNG:  CTA b/l, no rales, wheezes, or rhonchi    HEART:  RRR, S1, S2    ABDOMEN:  soft, nontender, nondistended, no suprapubic tenderness    : No inguinal tenderness/lymphadenopathy, + left scrotal swelling (improving), mildly TTP. Left testicle swollen. Unable to assess erythema. No urethral discharge. No left epididymis TTP. Right testicle NTTP, non swollen, right scrotum is not swollen. Sensation is intact.     EXTREMITIES: no edema, cyanosis, or calf tenderness, no gross deformities or lesions    NERVOUS SYSTEM: aao /3 , sensory intact , motor 5/5 intact         CBC Full  -  ( 16 Jul 2020 08:39 )    WBC Count : 9.87 K/uL    RBC Count : 5.27 M/uL    Hemoglobin : 14.6 g/dL    Hematocrit : 45.7 %    Platelet Count - Automated : 264 K/uL    Mean Cell Volume : 86.7 fl    Mean Cell Hemoglobin : 27.7 pg    Mean Cell Hemoglobin Concentration : 31.9 gm/dL    Auto Neutrophil # : x    Auto Lymphocyte # : x    Auto Monocyte # : x    Auto Eosinophil # : x    Auto Basophil # : x    Auto Neutrophil % : x    Auto Lymphocyte % : x    Auto Monocyte % : x    Auto Eosinophil % : x    Auto Basophil % : x        07-16        140  |  108  |  11    ----------------------------<  91    3.9   |  26  |  0.76        Ca    8.5      16 Jul 2020 08:39        TPro  7.7  /  Alb  2.9<L>  /  TBili  0.2  /  DBili  x   /  AST  17  /  ALT  26  /  AlkPhos  61  07-16                HOSPITAL COURSE:     In the ED the pt was found to have an infection of your right testicle. Labs showed a leukocytosis and UA showed sign of infection. The patient was given a dose of zosyn and started on levaquin and pain control. He was admitted to the hospital. ID was consulted who recommended ceftriaxone for antibiotic coverage. Urology was consulted, the recommended ibuprofen for pain/swelling and continued ab treatment. On exam 7/15 the pt reported decreased pain with palpation of the testicle and reduced swelling was appreciated. The pt reported a hx of hypothyroid, TSH was elevated. We plan to d/c the pt with his last dose of levothyroxine and antibiotic therapy with plan to FU with primary care physician within the next week. From a medical standpoint, pt is stable to be discharged with instructions to return for care if he destabilizes. He is aware of and the  agrees with the plan to take oral antibiotics  as directed.         CONSULTANTS:     Urology: Dr. Varner    ID: Dr. Francois    ---    TIME SPENT:    I, the attending physician, was physically present for the key portions of the evaluation and management (E/M) service provided. The total amount of time spent reviewing the hospital notes, laboratory values, imaging findings, assessing/counseling the patient, discussing with consultant physicians, social work, nursing staff was -- minutes        ---    Primary care provider was made aware of plan for discharge:      [  ] NO     [  ] YES HPI:    49 year old male PMHx hypothyroidism presents as transfer from [place] c/o left groin pain and swelling x5 days. He states that on Tuesday he lifted something very heavy and subsequently Weds developed scrotal pain. He denies hearing a pop or any noise but he did feel a pulling in his left groin. He states the pain is a 10/10 and is localized to his left scrotal area, denies radiating pain to his abdomen. He states when he holds up his scrotum the sensation is different but still just as painful. He has been taking Aleve OTC for the pain which was helping last week but did not relieve his symptoms today. He took Aleve at 3 pm today. Denies recent illness, sick contacts, recent travel. He went to his PCP today and was sent to the ED from there. He felt feverish today but was afebrile upon arrival to ED. He denies visual changes, H/A, CP, SOB, cough, dizziness, syncope, abd pain, N, V, D, urinary urigency frequency or dysuria, and blood in urine. He has a hx of hypothyroidism but has not taken levothyroxine for about 1 year due to loss of insurance.         On admission: Vitals: T 100.5  /85 RR 16 SpO2 96% on RA    Labs: WBC 14.48, Neutrophil 12.53 (86.6%), Lymphocyte 7.6%, Glu 105    UA: positive for nitrites, trace blood, many bacteria     CT abdomen/pelvis w/IV cont: partially imaged left scrotal wall thickening, better visualized on scrotal ultrasound of same date.    US testicles: Findings suggestive of left epididymoorchitis with increased flow to left testis and left epididymis. There is associated complex hydrocele    EKG: Sinus tachy 109 BPM, borderline LVH, nonspecific T wave abnormality    Given Levaquin 500 mg IV x1, IV NS 1L bolus x1, Morphine 4 mg IVP x1, Zofran 4 mg IVP x1 (13 Jul 2020 23:32)        Vital Signs Last 24 Hrs    T(C): 36.9 (16 Jul 2020 04:45), Max: 36.9 (16 Jul 2020 04:45)    T(F): 98.5 (16 Jul 2020 04:45), Max: 98.5 (16 Jul 2020 04:45)    HR: 73 (16 Jul 2020 04:45) (67 - 82)    BP: 137/88 (16 Jul 2020 04:45) (137/88 - 155/92)    BP(mean): --    RR: 17 (16 Jul 2020 04:45) (17 - 18)    SpO2: 97% (16 Jul 2020 04:45) (97% - 99%)        PHYSICAL EXAM:    GENERAL: NAD, awake and alert, cooperative.     HEENT:  anicteric, moist mucous membranes    SKIN: Intact, no lesions noted.     CHEST/LUNG:  CTA b/l, no rales, wheezes, or rhonchi    HEART:  RRR, S1, S2    ABDOMEN:  soft, nontender, nondistended, no suprapubic tenderness    : No inguinal tenderness/lymphadenopathy, + left scrotal swelling (improving), mildly TTP. Left testicle swollen. Unable to assess erythema. No urethral discharge. No left epididymis TTP. Right testicle NTTP, non swollen, right scrotum is not swollen. Sensation is intact.     EXTREMITIES: no edema, cyanosis, or calf tenderness, no gross deformities or lesions    NERVOUS SYSTEM: aao /3 , sensory intact , motor 5/5 intact         CBC Full  -  ( 16 Jul 2020 08:39 )    WBC Count : 9.87 K/uL    RBC Count : 5.27 M/uL    Hemoglobin : 14.6 g/dL    Hematocrit : 45.7 %    Platelet Count - Automated : 264 K/uL    Mean Cell Volume : 86.7 fl    Mean Cell Hemoglobin : 27.7 pg    Mean Cell Hemoglobin Concentration : 31.9 gm/dL    Auto Neutrophil # : x    Auto Lymphocyte # : x    Auto Monocyte # : x    Auto Eosinophil # : x    Auto Basophil # : x    Auto Neutrophil % : x    Auto Lymphocyte % : x    Auto Monocyte % : x    Auto Eosinophil % : x    Auto Basophil % : x        07-16        140  |  108  |  11    ----------------------------<  91    3.9   |  26  |  0.76        Ca    8.5      16 Jul 2020 08:39        TPro  7.7  /  Alb  2.9<L>  /  TBili  0.2  /  DBili  x   /  AST  17  /  ALT  26  /  AlkPhos  61  07-16                HOSPITAL COURSE:     In the ED the pt was found to have an infection of your right testicle. Labs showed a leukocytosis and UA showed sign of infection. The patient was given a dose of zosyn and started on levaquin and pain control. He was admitted to the hospital. ID was consulted who recommended ceftriaxone for antibiotic coverage. Urology was consulted, the recommended ibuprofen for pain/swelling and continued ab treatment. On exam 7/15 the pt reported decreased pain with palpation of the testicle and reduced swelling was appreciated. The pt reported a hx of hypothyroid, TSH was elevated. We plan to d/c the pt with his last dose of levothyroxine and antibiotic therapy with plan to FU with primary care physician within the next week. From a medical standpoint, pt is stable to be discharged with instructions to return for care if he destabilizes. He is aware of and the  agrees with the plan to take oral antibiotics  as directed.  He will be given 1G of Azithromycin prior to discharge followed by Vantin x 7 days.         CONSULTANTS:     Urology: Dr. Varner    ID: Dr. Francois    ---    TIME SPENT:    I, the attending physician, was physically present for the key portions of the evaluation and management (E/M) service provided. The total amount of time spent reviewing the hospital notes, laboratory values, imaging findings, assessing/counseling the patient, discussing with consultant physicians, social work, nursing staff was -- minutes        ---    Primary care provider was made aware of plan for discharge:      [  ] NO     [  ] YES

## 2020-07-15 NOTE — PROGRESS NOTE ADULT - ASSESSMENT
Improving L epididymo orchitis.      Ice/elevation/NSAIDS/rest  Agree w/ ID re: antibx  Outpt  f/u w/ Dr Varner

## 2020-07-15 NOTE — PROGRESS NOTE ADULT - ATTENDING COMMENTS
Patient seen and examined with Resident House Staff.  I agree with the note above.  Patient being treated for epididymoorchitis and currently on IV Ceftriaxone.  Pending Urine Cultures as well as GC cultures.  May need to tailor abx to this.  ID on board and appreciate their recommendations.
pt seen and examine today see above plan .  49 m admitted for sepsis  poa likely 2/2 left epididymoorchitis - iv abx Rocephin 1 gm daily , fu up blood cult / 2 , leucocytosis . pending uro consult , id dr cunningham gp.

## 2020-07-15 NOTE — DISCHARGE NOTE PROVIDER - CARE PROVIDER_API CALL
Mercedes Varner  UROLOGY  [address] [place] [address] [id number]  [place] [address]  Phone: [phone]  Fax: [phone]  Follow Up Time:

## 2020-07-15 NOTE — DISCHARGE NOTE PROVIDER - NSDCFUADDINST_GEN_ALL_CORE_FT
Refrain from sexual activity until you follow up with Urology and Primary care.  Take Levothyroxine as ordered, follow up with Primary care.  Take antibiotic therapy as directed.   Take pain medications as needed.

## 2020-07-15 NOTE — PROGRESS NOTE ADULT - SUBJECTIVE AND OBJECTIVE BOX
Patient is a 49y old  Male who presents with a chief complaint of Left epididymoorchitis (14 Jul 2020 16:10)    The patient was admitted for meeting sepsis poa criteria.    INTERVAL HPI/OVERNIGHT EVENTS: The patient was seen and examined this morning while laying in bed eating breakfast. He reports that he marsh snot have any fever/chills/myalgias/n/v/dysuria/hematuria/hematospermia/painful ejaculation/weakness. He has improved pain/tenderness in the left scrotum/testicle.       MEDICATIONS  (STANDING):  cefTRIAXone   IVPB 1000 milliGRAM(s) IV Intermittent every 24 hours  enoxaparin Injectable 40 milliGRAM(s) SubCutaneous daily    MEDICATIONS  (PRN):  ibuprofen  Tablet. 400 milliGRAM(s) Oral three times a day PRN Mild Pain (1 - 3)  oxycodone    5 mG/acetaminophen 325 mG 1 Tablet(s) Oral every 6 hours PRN Moderate Pain (4 - 6)  oxycodone    5 mG/acetaminophen 325 mG 2 Tablet(s) Oral every 6 hours PRN Severe Pain (7 - 10)      Allergies    No Known Drug Allergies  shellfish (Anaphylaxis; Urticaria; Short breath)    Intolerances      REVIEW OF SYSTEMS:  CONSTITUTIONAL: No fever or chills  HEENT: No headache  RESPIRATORY: No cough, wheezing, or shortness of breath  CARDIOVASCULAR: No chest pain, palpitations  GASTROINTESTINAL: No abd pain, nausea, vomiting, or diarrhea  GENITOURINARY: No dysuria, frequency, or hematuria, pain scrotal area (improving)  NEUROLOGICAL: No focal weakness or dizziness  MUSCULOSKELETAL: No myalgias       Vital Signs Last 24 Hrs  T(C): 36.9 (15 Jul 2020 04:55), Max: 37.4 (14 Jul 2020 21:15)  T(F): 98.5 (15 Jul 2020 04:55), Max: 99.3 (14 Jul 2020 21:15)  HR: 91 (15 Jul 2020 04:55) (91 - 96)  BP: 138/85 (15 Jul 2020 04:55) (119/80 - 142/88)  BP(mean): --  RR: 17 (15 Jul 2020 04:55) (17 - 18)  SpO2: 99% (15 Jul 2020 04:55) (96% - 99%)    PHYSICAL EXAM:  GENERAL: NAD, awake and alert, cooperative.   HEENT:  anicteric, moist mucous membranes  SKIN: Intact, no lesions noted.   CHEST/LUNG:  CTA b/l, no rales, wheezes, or rhonchi  HEART:  RRR, S1, S2  ABDOMEN:  soft, nontender, nondistended, no suprapubic tenderness  : No inguinal tenderness/lymphadenopathy, + left scrotal swelling (improving) and TTP. Left testicle swollen. Unable to assess erythema. No urethral discharge. No left epididymis TTP. Right testicle NTTP, non swollen, right scrotum is not swollen. Sensation is intact.   EXTREMITIES: no edema, cyanosis, or calf tenderness, no gross deformities or lesions  NERVOUS SYSTEM: aao /3 , sensory intact , motor 5/5 intact     LABS:                        13.0   16.84 )-----------( 218      ( 15 Jul 2020 08:11 )             40.9     CBC Full  -  ( 15 Jul 2020 08:11 )  WBC Count : 16.84 K/uL  Hemoglobin : 13.0 g/dL  Hematocrit : 40.9 %  Platelet Count - Automated : 218 K/uL  Mean Cell Volume : 87.6 fl  Mean Cell Hemoglobin : 27.8 pg  Mean Cell Hemoglobin Concentration : 31.8 gm/dL  Auto Neutrophil # : x  Auto Lymphocyte # : x  Auto Monocyte # : x  Auto Eosinophil # : x  Auto Basophil # : x  Auto Neutrophil % : x  Auto Lymphocyte % : x  Auto Monocyte % : x  Auto Eosinophil % : x  Auto Basophil % : x    15 Jul 2020 08:11    141    |  110    |  17     ----------------------------<  104    3.9     |  27     |  0.97     Ca    8.3        15 Jul 2020 08:11    TPro  7.4    /  Alb  2.8    /  TBili  0.2    /  DBili  x      /  AST  15     /  ALT  27     /  AlkPhos  58     15 Jul 2020 08:11        CAPILLARY BLOOD GLUCOSE              RADIOLOGY & ADDITIONAL TESTS:    Personally reviewed.     Consultant(s) Notes Reviewed:  [x] YES  [ ] NO Patient is a 49y old  Male who presents with a chief complaint of Left epididymoorchitis (14 Jul 2020 16:10)    The patient was admitted for meeting sepsis poa criteria.    INTERVAL HPI/OVERNIGHT EVENTS: The patient was seen and examined this morning while laying in bed eating breakfast. He reports that he marsh snot have any fever/chills/myalgias/n/v/dysuria/hematuria/hematospermia/painful ejaculation/weakness. He has improved pain/tenderness in the left scrotum/testicle.       MEDICATIONS  (STANDING):  cefTRIAXone   IVPB 1000 milliGRAM(s) IV Intermittent every 24 hours  enoxaparin Injectable 40 milliGRAM(s) SubCutaneous daily    MEDICATIONS  (PRN):  ibuprofen  Tablet. 400 milliGRAM(s) Oral three times a day PRN Mild Pain (1 - 3)  oxycodone    5 mG/acetaminophen 325 mG 1 Tablet(s) Oral every 6 hours PRN Moderate Pain (4 - 6)  oxycodone    5 mG/acetaminophen 325 mG 2 Tablet(s) Oral every 6 hours PRN Severe Pain (7 - 10)      Allergies    No Known Drug Allergies  shellfish (Anaphylaxis; Urticaria; Short breath)    Intolerances      REVIEW OF SYSTEMS:  CONSTITUTIONAL: No fever or chills  HEENT: No headache  RESPIRATORY: No cough, wheezing, or shortness of breath  CARDIOVASCULAR: No chest pain, palpitations  GASTROINTESTINAL: No abd pain, nausea, vomiting, or diarrhea  GENITOURINARY: No dysuria, frequency, or hematuria, pain scrotal area (improving)  NEUROLOGICAL: No focal weakness or dizziness  MUSCULOSKELETAL: No myalgias       Vital Signs Last 24 Hrs  T(C): 36.9 (15 Jul 2020 04:55), Max: 37.4 (14 Jul 2020 21:15)  T(F): 98.5 (15 Jul 2020 04:55), Max: 99.3 (14 Jul 2020 21:15)  HR: 91 (15 Jul 2020 04:55) (91 - 96)  BP: 138/85 (15 Jul 2020 04:55) (119/80 - 142/88)  BP(mean): --  RR: 17 (15 Jul 2020 04:55) (17 - 18)  SpO2: 99% (15 Jul 2020 04:55) (96% - 99%)    PHYSICAL EXAM:  GENERAL: NAD, awake and alert, cooperative.   HEENT:  anicteric, moist mucous membranes  SKIN: Intact, no lesions noted.   CHEST/LUNG:  CTA b/l, no rales, wheezes, or rhonchi  HEART:  RRR, S1, S2  ABDOMEN:  soft, nontender, nondistended, no suprapubic tenderness  : No inguinal tenderness/lymphadenopathy, + left scrotal swelling (improving), mildly TTP. Left testicle swollen. Unable to assess erythema. No urethral discharge. No left epididymis TTP. Right testicle NTTP, non swollen, right scrotum is not swollen. Sensation is intact.   EXTREMITIES: no edema, cyanosis, or calf tenderness, no gross deformities or lesions  NERVOUS SYSTEM: aao /3 , sensory intact , motor 5/5 intact     LABS:                        13.0   16.84 )-----------( 218      ( 15 Jul 2020 08:11 )             40.9     CBC Full  -  ( 15 Jul 2020 08:11 )  WBC Count : 16.84 K/uL  Hemoglobin : 13.0 g/dL  Hematocrit : 40.9 %  Platelet Count - Automated : 218 K/uL  Mean Cell Volume : 87.6 fl  Mean Cell Hemoglobin : 27.8 pg  Mean Cell Hemoglobin Concentration : 31.8 gm/dL  Auto Neutrophil # : x  Auto Lymphocyte # : x  Auto Monocyte # : x  Auto Eosinophil # : x  Auto Basophil # : x  Auto Neutrophil % : x  Auto Lymphocyte % : x  Auto Monocyte % : x  Auto Eosinophil % : x  Auto Basophil % : x    15 Jul 2020 08:11    141    |  110    |  17     ----------------------------<  104    3.9     |  27     |  0.97     Ca    8.3        15 Jul 2020 08:11    TPro  7.4    /  Alb  2.8    /  TBili  0.2    /  DBili  x      /  AST  15     /  ALT  27     /  AlkPhos  58     15 Jul 2020 08:11        CAPILLARY BLOOD GLUCOSE              RADIOLOGY & ADDITIONAL TESTS:    Personally reviewed.     Consultant(s) Notes Reviewed:  [x] YES  [ ] NO

## 2020-07-15 NOTE — DISCHARGE NOTE PROVIDER - NSDCMRMEDTOKEN_GEN_ALL_CORE_FT
cefpodoxime 200 mg oral tablet: 1 tab(s) orally 2 times a day   ibuprofen 400 mg oral tablet: 1 tab(s) orally 3 times a day, As needed, Mild Pain (1 - 3)  levothyroxine 75 mcg (0.075 mg) oral tablet: 1 tab(s) orally once a day

## 2020-07-15 NOTE — PROGRESS NOTE ADULT - SUBJECTIVE AND OBJECTIVE BOX
INTERVAL HPI/OVERNIGHT EVENTS:  Feeling better. Void/BM nl    MEDICATIONS  (STANDING):  cefTRIAXone   IVPB 1000 milliGRAM(s) IV Intermittent every 24 hours  enoxaparin Injectable 40 milliGRAM(s) SubCutaneous daily  levothyroxine 75 MICROGram(s) Oral daily    MEDICATIONS  (PRN):  ibuprofen  Tablet. 400 milliGRAM(s) Oral three times a day PRN Mild Pain (1 - 3)  oxycodone    5 mG/acetaminophen 325 mG 1 Tablet(s) Oral every 6 hours PRN Moderate Pain (4 - 6)  oxycodone    5 mG/acetaminophen 325 mG 2 Tablet(s) Oral every 6 hours PRN Severe Pain (7 - 10)        Vital Signs Last 24 Hrs  T(C): 36.6 (15 Jul 2020 12:52), Max: 37.4 (14 Jul 2020 21:15)  T(F): 97.9 (15 Jul 2020 12:52), Max: 99.3 (14 Jul 2020 21:15)  HR: 82 (15 Jul 2020 12:52) (82 - 91)  BP: 144/89 (15 Jul 2020 12:52) (138/85 - 144/89)  BP(mean): --  RR: 18 (15 Jul 2020 12:52) (17 - 18)  SpO2: 98% (15 Jul 2020 12:52) (96% - 99%)    PHYSICAL EXAM:    ABDOMEN: benign  GENITALIA: R testis nl  L testis enlarged, firm, mildly tender, L epi indistinct. No fluctuance, drainage, crepitus. Overlying skin nl.    LABS:                        13.0   16.84 )-----------( 218      ( 15 Jul 2020 08:11 )             40.9     07-15    141  |  110<H>  |  17  ----------------------------<  104<H>  3.9   |  27  |  0.97    Ca    8.3<L>      15 Jul 2020 08:11    TPro  7.4  /  Alb  2.8<L>  /  TBili  0.2  /  DBili  x   /  AST  15  /  ALT  27  /  AlkPhos  58  07-15          Blood Cultures:    RADIOLOGY & ADDITIONAL TESTS:

## 2020-07-15 NOTE — DISCHARGE NOTE PROVIDER - NSDCCPCAREPLAN_GEN_ALL_CORE_FT
PRINCIPAL DISCHARGE DIAGNOSIS  Diagnosis: Epididymo-orchitis, acute  Assessment and Plan of Treatment: -You were found to have an infection of the left testicle.   -We started antibiotic therapy for treatment while in the hospital, in addition to pain medication.  -We consulted Infectious disease and urology who gave recommendations on your care.  -Please follow up with urology and your primary terrence doctor outpatient within the next week.      SECONDARY DISCHARGE DIAGNOSES  Diagnosis: Hypothyroid  Assessment and Plan of Treatment: -We found that your TSH was elevated in the hospital. This is a sign of Hypothyroidism.   -You reported inabaility to continue filling your medications.   -We have discussed with your pharmacy and plan to prescribe you Levothyroxine.   -Please take as directed and follow up with your primary care provider in the next week.

## 2020-07-16 ENCOUNTER — TRANSCRIPTION ENCOUNTER (OUTPATIENT)
Age: 50
End: 2020-07-16

## 2020-07-16 VITALS
TEMPERATURE: 98 F | HEART RATE: 73 BPM | RESPIRATION RATE: 17 BRPM | SYSTOLIC BLOOD PRESSURE: 137 MMHG | DIASTOLIC BLOOD PRESSURE: 88 MMHG | OXYGEN SATURATION: 97 %

## 2020-07-16 LAB
ALBUMIN SERPL ELPH-MCNC: 2.9 G/DL — LOW (ref 3.3–5)
ALP SERPL-CCNC: 61 U/L — SIGNIFICANT CHANGE UP (ref 40–120)
ALT FLD-CCNC: 26 U/L — SIGNIFICANT CHANGE UP (ref 12–78)
ANION GAP SERPL CALC-SCNC: 6 MMOL/L — SIGNIFICANT CHANGE UP (ref 5–17)
AST SERPL-CCNC: 17 U/L — SIGNIFICANT CHANGE UP (ref 15–37)
BILIRUB SERPL-MCNC: 0.2 MG/DL — SIGNIFICANT CHANGE UP (ref 0.2–1.2)
BUN SERPL-MCNC: 11 MG/DL — SIGNIFICANT CHANGE UP (ref 7–23)
C TRACH RRNA SPEC QL NAA+PROBE: SIGNIFICANT CHANGE UP
CALCIUM SERPL-MCNC: 8.5 MG/DL — SIGNIFICANT CHANGE UP (ref 8.5–10.1)
CHLORIDE SERPL-SCNC: 108 MMOL/L — SIGNIFICANT CHANGE UP (ref 96–108)
CO2 SERPL-SCNC: 26 MMOL/L — SIGNIFICANT CHANGE UP (ref 22–31)
CREAT SERPL-MCNC: 0.76 MG/DL — SIGNIFICANT CHANGE UP (ref 0.5–1.3)
GLUCOSE SERPL-MCNC: 91 MG/DL — SIGNIFICANT CHANGE UP (ref 70–99)
HCT VFR BLD CALC: 45.7 % — SIGNIFICANT CHANGE UP (ref 39–50)
HGB BLD-MCNC: 14.6 G/DL — SIGNIFICANT CHANGE UP (ref 13–17)
MCHC RBC-ENTMCNC: 27.7 PG — SIGNIFICANT CHANGE UP (ref 27–34)
MCHC RBC-ENTMCNC: 31.9 GM/DL — LOW (ref 32–36)
MCV RBC AUTO: 86.7 FL — SIGNIFICANT CHANGE UP (ref 80–100)
N GONORRHOEA RRNA SPEC QL NAA+PROBE: SIGNIFICANT CHANGE UP
NRBC # BLD: 0 /100 WBCS — SIGNIFICANT CHANGE UP (ref 0–0)
PLATELET # BLD AUTO: 264 K/UL — SIGNIFICANT CHANGE UP (ref 150–400)
POTASSIUM SERPL-MCNC: 3.9 MMOL/L — SIGNIFICANT CHANGE UP (ref 3.5–5.3)
POTASSIUM SERPL-SCNC: 3.9 MMOL/L — SIGNIFICANT CHANGE UP (ref 3.5–5.3)
PROT SERPL-MCNC: 7.7 G/DL — SIGNIFICANT CHANGE UP (ref 6–8.3)
RBC # BLD: 5.27 M/UL — SIGNIFICANT CHANGE UP (ref 4.2–5.8)
RBC # FLD: 12.7 % — SIGNIFICANT CHANGE UP (ref 10.3–14.5)
SODIUM SERPL-SCNC: 140 MMOL/L — SIGNIFICANT CHANGE UP (ref 135–145)
WBC # BLD: 9.87 K/UL — SIGNIFICANT CHANGE UP (ref 3.8–10.5)
WBC # FLD AUTO: 9.87 K/UL — SIGNIFICANT CHANGE UP (ref 3.8–10.5)

## 2020-07-16 PROCEDURE — 84443 ASSAY THYROID STIM HORMONE: CPT

## 2020-07-16 PROCEDURE — 99285 EMERGENCY DEPT VISIT HI MDM: CPT | Mod: 25

## 2020-07-16 PROCEDURE — 85027 COMPLETE CBC AUTOMATED: CPT

## 2020-07-16 PROCEDURE — 87491 CHLMYD TRACH DNA AMP PROBE: CPT

## 2020-07-16 PROCEDURE — 84439 ASSAY OF FREE THYROXINE: CPT

## 2020-07-16 PROCEDURE — 80053 COMPREHEN METABOLIC PANEL: CPT

## 2020-07-16 PROCEDURE — 87040 BLOOD CULTURE FOR BACTERIA: CPT

## 2020-07-16 PROCEDURE — 84481 FREE ASSAY (FT-3): CPT

## 2020-07-16 PROCEDURE — 87635 SARS-COV-2 COVID-19 AMP PRB: CPT

## 2020-07-16 PROCEDURE — 99239 HOSP IP/OBS DSCHRG MGMT >30: CPT

## 2020-07-16 PROCEDURE — 36415 COLL VENOUS BLD VENIPUNCTURE: CPT

## 2020-07-16 PROCEDURE — 86769 SARS-COV-2 COVID-19 ANTIBODY: CPT

## 2020-07-16 RX ORDER — CEFPODOXIME PROXETIL 100 MG
200 TABLET ORAL EVERY 12 HOURS
Refills: 0 | Status: DISCONTINUED | OUTPATIENT
Start: 2020-07-16 | End: 2020-07-16

## 2020-07-16 RX ORDER — CEFPODOXIME PROXETIL 100 MG
1 TABLET ORAL
Qty: 12 | Refills: 0
Start: 2020-07-16 | End: 2020-07-21

## 2020-07-16 RX ORDER — AZITHROMYCIN 500 MG/1
1 TABLET, FILM COATED ORAL ONCE
Refills: 0 | Status: DISCONTINUED | OUTPATIENT
Start: 2020-07-16 | End: 2020-07-16

## 2020-07-16 RX ORDER — AZITHROMYCIN 500 MG/1
1000 TABLET, FILM COATED ORAL ONCE
Refills: 0 | Status: COMPLETED | OUTPATIENT
Start: 2020-07-16 | End: 2020-07-16

## 2020-07-16 RX ADMIN — Medication 200 MILLIGRAM(S): at 12:52

## 2020-07-16 RX ADMIN — AZITHROMYCIN 1000 MILLIGRAM(S): 500 TABLET, FILM COATED ORAL at 12:52

## 2020-07-16 RX ADMIN — Medication 75 MICROGRAM(S): at 05:09

## 2020-07-16 NOTE — PROGRESS NOTE ADULT - SUBJECTIVE AND OBJECTIVE BOX
infectious diseases progress note:    ARIANA VILLAVICENCIO is a 49y y. o. Male patient    No concerning overnight events    Allergies    No Known Drug Allergies  shellfish (Anaphylaxis; Urticaria; Short breath)    Intolerances        ANTIBIOTICS/RELEVANT:  antimicrobials  azithromycin  40 mG/mL Suspension 1000 milliGRAM(s) Oral once  cefpodoxime 200 milliGRAM(s) Oral every 12 hours    immunologic:    OTHER:  enoxaparin Injectable 40 milliGRAM(s) SubCutaneous daily  ibuprofen  Tablet. 400 milliGRAM(s) Oral three times a day PRN  levothyroxine 75 MICROGram(s) Oral daily  oxycodone    5 mG/acetaminophen 325 mG 1 Tablet(s) Oral every 6 hours PRN  oxycodone    5 mG/acetaminophen 325 mG 2 Tablet(s) Oral every 6 hours PRN      Objective:  Vital Signs Last 24 Hrs  T(C): 36.9 (16 Jul 2020 04:45), Max: 36.9 (16 Jul 2020 04:45)  T(F): 98.5 (16 Jul 2020 04:45), Max: 98.5 (16 Jul 2020 04:45)  HR: 73 (16 Jul 2020 04:45) (67 - 82)  BP: 137/88 (16 Jul 2020 04:45) (137/88 - 155/92)  BP(mean): --  RR: 17 (16 Jul 2020 04:45) (17 - 18)  SpO2: 97% (16 Jul 2020 04:45) (97% - 99%)    T(C): 36.9 (07-16-20 @ 04:45), Max: 37.4 (07-14-20 @ 21:15)  T(C): 36.9 (07-16-20 @ 04:45), Max: 37.7 (07-13-20 @ 22:44)  T(C): 36.9 (07-16-20 @ 04:45), Max: 37.7 (07-13-20 @ 22:44)    PHYSICAL EXAM:  HEENT: NC atraumatic  Neck: supple  Respiratory: no accessory muscle use, breathing comfortably  Cardiovascular: distant  Gastrointestinal: normal appearing, nondistended  Extremities: no clubbing, no cyanosis,      LABS:                          14.6   9.87  )-----------( 264      ( 16 Jul 2020 08:39 )             45.7       9.87 07-16 @ 08:39  16.84 07-15 @ 08:11  21.75 07-14 @ 08:12      07-16    140  |  108  |  11  ----------------------------<  91  3.9   |  26  |  0.76    Ca    8.5      16 Jul 2020 08:39    TPro  7.7  /  Alb  2.9<L>  /  TBili  0.2  /  DBili  x   /  AST  17  /  ALT  26  /  AlkPhos  61  07-16      Creatinine, Serum: 0.76 mg/dL (07-16-20 @ 08:39)  Creatinine, Serum: 0.97 mg/dL (07-15-20 @ 08:11)  Creatinine, Serum: 0.94 mg/dL (07-14-20 @ 08:12)                INFLAMMATORY MARKERS  Auto Neutrophil #: 18.49 K/uL (07-14-20 @ 08:12)  Auto Lymphocyte #: 1.31 K/uL (07-14-20 @ 08:12)      Auto Eosinophil #: 0.00 K/uL (07-14-20 @ 08:12)        MICROBIOLOGY:              RADIOLOGY & ADDITIONAL STUDIES:
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
## 2020-07-16 NOTE — PROGRESS NOTE ADULT - REASON FOR ADMISSION
Left epididymoorchitis

## 2020-07-16 NOTE — PROGRESS NOTE ADULT - PROBLEM SELECTOR PLAN 2
per medicine.  From an ID standpoint no further requirement for inpatient status for the management of ID issues. Fine with discharge from ID standpoint when other medical issues no longer require inpatient care and social issues allow for a safe discharge plan.  Thank you for consulting us and involving us in the management of this most interesting and challenging case.     Please Call with any further questions

## 2020-07-16 NOTE — PROGRESS NOTE ADULT - PROBLEM SELECTOR PLAN 1
as discussed with team recommend Azithromycin 1 gram PO x1 now then  dc on Vantin 200mg PO BID with last day 7/22

## 2020-07-16 NOTE — DISCHARGE NOTE NURSING/CASE MANAGEMENT/SOCIAL WORK - PATIENT PORTAL LINK FT
You can access the FollowMyHealth Patient Portal offered by [facility] by registering at the following website: [email or web address]. By joining [employer]’s FollowMyHealth portal, you will also be able to view your health information using other applications (apps) compatible with our system.

## 2020-07-16 NOTE — PROGRESS NOTE ADULT - ASSESSMENT
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    different MRN form [place]  US with Left testis suggestive of left epididymoorchitis with increased flow to left testis and left epididymis. There is associated complex hydrocele

## 2020-07-16 NOTE — PROGRESS NOTE ADULT - SUBJECTIVE AND OBJECTIVE BOX
Patient is a 49y old  Male who presents with a chief complaint of Left epididymoorchitis (15 Jul 2020 13:43)    The patient was admitted for meeting sepsis poa criteria.    INTERVAL HPI/OVERNIGHT EVENTS: The patient was seen and examined this morning while laying in bed eating breakfast. He reports that he marsh snot have any fever/chills/myalgias/n/v/dysuria/hematuria/hematospermia/painful ejaculation/weakness. He has improved pain/tenderness in the left scrotum/testicle    MEDICATIONS  (STANDING):  cefTRIAXone   IVPB 1000 milliGRAM(s) IV Intermittent every 24 hours  enoxaparin Injectable 40 milliGRAM(s) SubCutaneous daily  levothyroxine 75 MICROGram(s) Oral daily    MEDICATIONS  (PRN):  ibuprofen  Tablet. 400 milliGRAM(s) Oral three times a day PRN Mild Pain (1 - 3)  oxycodone    5 mG/acetaminophen 325 mG 1 Tablet(s) Oral every 6 hours PRN Moderate Pain (4 - 6)  oxycodone    5 mG/acetaminophen 325 mG 2 Tablet(s) Oral every 6 hours PRN Severe Pain (7 - 10)      Allergies    No Known Drug Allergies  shellfish (Anaphylaxis; Urticaria; Short breath)    Intolerances        REVIEW OF SYSTEMS:  CONSTITUTIONAL: No fever or chills  HEENT: No headache, no sore throat  RESPIRATORY: No cough, wheezing, or shortness of breath  CARDIOVASCULAR: No chest pain, palpitations  GASTROINTESTINAL: No abd pain, nausea, vomiting, or diarrhea  GENITOURINARY: No dysuria, frequency, or hematuria  NEUROLOGICAL: No focal weakness or dizziness  MUSCULOSKELETAL: No myalgias     Vital Signs Last 24 Hrs  T(C): 36.9 (16 Jul 2020 04:45), Max: 36.9 (16 Jul 2020 04:45)  T(F): 98.5 (16 Jul 2020 04:45), Max: 98.5 (16 Jul 2020 04:45)  HR: 73 (16 Jul 2020 04:45) (67 - 82)  BP: 137/88 (16 Jul 2020 04:45) (137/88 - 155/92)  BP(mean): --  RR: 17 (16 Jul 2020 04:45) (17 - 18)  SpO2: 97% (16 Jul 2020 04:45) (97% - 99%)    PHYSICAL EXAM:  GENERAL: NAD  HEENT:  anicteric, moist mucous membranes  CHEST/LUNG:  CTA b/l, no rales, wheezes, or rhonchi  HEART:  RRR, S1, S2  ABDOMEN:  BS+, soft, nontender, nondistended  EXTREMITIES: no edema, cyanosis, or calf tenderness  NERVOUS SYSTEM: answers questions and follows commands appropriately    LABS:                        14.6   9.87  )-----------( 264      ( 16 Jul 2020 08:39 )             45.7     CBC Full  -  ( 16 Jul 2020 08:39 )  WBC Count : 9.87 K/uL  Hemoglobin : 14.6 g/dL  Hematocrit : 45.7 %  Platelet Count - Automated : 264 K/uL  Mean Cell Volume : 86.7 fl  Mean Cell Hemoglobin : 27.7 pg  Mean Cell Hemoglobin Concentration : 31.9 gm/dL  Auto Neutrophil # : x  Auto Lymphocyte # : x  Auto Monocyte # : x  Auto Eosinophil # : x  Auto Basophil # : x  Auto Neutrophil % : x  Auto Lymphocyte % : x  Auto Monocyte % : x  Auto Eosinophil % : x  Auto Basophil % : x    16 Jul 2020 08:39    140    |  108    |  11     ----------------------------<  91     3.9     |  26     |  0.76     Ca    8.5        16 Jul 2020 08:39    TPro  7.7    /  Alb  2.9    /  TBili  0.2    /  DBili  x      /  AST  17     /  ALT  26     /  AlkPhos  61     16 Jul 2020 08:39        CAPILLARY BLOOD GLUCOSE            Culture - Blood (collected 07-14-20 @ 19:04)  Source: .Blood Blood  Preliminary Report (07-15-20 @ 20:01):    No growth to date.    Culture - Blood (collected 07-14-20 @ 19:04)  Source: .Blood Blood  Preliminary Report (07-15-20 @ 20:01):    No growth to date.        RADIOLOGY & ADDITIONAL TESTS:    Personally reviewed.     Consultant(s) Notes Reviewed:  [x] YES  [ ] NO Patient is a 49y old  Male who presents with a chief complaint of Left epididymoorchitis (15 Jul 2020 13:43)    The patient was admitted for meeting sepsis poa criteria.    INTERVAL HPI/OVERNIGHT EVENTS: The patient was seen and examined this morning. He reports that he does not have any fever/chills/myalgias/n/v/dysuria/hematuria/hematospermia/painful ejaculation/weakness. He has tenderness to palpation in the left scrotum/testicle    MEDICATIONS  (STANDING):  cefTRIAXone   IVPB 1000 milliGRAM(s) IV Intermittent every 24 hours  enoxaparin Injectable 40 milliGRAM(s) SubCutaneous daily  levothyroxine 75 MICROGram(s) Oral daily    MEDICATIONS  (PRN):  ibuprofen  Tablet. 400 milliGRAM(s) Oral three times a day PRN Mild Pain (1 - 3)  oxycodone    5 mG/acetaminophen 325 mG 1 Tablet(s) Oral every 6 hours PRN Moderate Pain (4 - 6)  oxycodone    5 mG/acetaminophen 325 mG 2 Tablet(s) Oral every 6 hours PRN Severe Pain (7 - 10)      Allergies    No Known Drug Allergies  shellfish (Anaphylaxis; Urticaria; Short breath)    Intolerances        REVIEW OF SYSTEMS:  CONSTITUTIONAL: No fever or chills  HEENT: No headache, no sore throat  RESPIRATORY: No cough, wheezing, or shortness of breath  CARDIOVASCULAR: No chest pain, palpitations  GASTROINTESTINAL: No abd pain, nausea, vomiting, or diarrhea  GENITOURINARY: No dysuria, frequency, or hematuria  NEUROLOGICAL: No focal weakness or dizziness  MUSCULOSKELETAL: No myalgias     Vital Signs Last 24 Hrs  T(C): 36.9 (16 Jul 2020 04:45), Max: 36.9 (16 Jul 2020 04:45)  T(F): 98.5 (16 Jul 2020 04:45), Max: 98.5 (16 Jul 2020 04:45)  HR: 73 (16 Jul 2020 04:45) (67 - 82)  BP: 137/88 (16 Jul 2020 04:45) (137/88 - 155/92)  BP(mean): --  RR: 17 (16 Jul 2020 04:45) (17 - 18)  SpO2: 97% (16 Jul 2020 04:45) (97% - 99%)    PHYSICAL EXAM:  GENERAL: NAD  HEENT:  anicteric, moist mucous membranes  CHEST/LUNG:  CTA b/l, no rales, wheezes, or rhonchi  HEART:  RRR, S1, S2  ABDOMEN:  BS+, soft, nontender, nondistended  EXTREMITIES: no edema, cyanosis, or calf tenderness  NERVOUS SYSTEM: answers questions and follows commands appropriately    LABS:                        14.6   9.87  )-----------( 264      ( 16 Jul 2020 08:39 )             45.7     CBC Full  -  ( 16 Jul 2020 08:39 )  WBC Count : 9.87 K/uL  Hemoglobin : 14.6 g/dL  Hematocrit : 45.7 %  Platelet Count - Automated : 264 K/uL  Mean Cell Volume : 86.7 fl  Mean Cell Hemoglobin : 27.7 pg  Mean Cell Hemoglobin Concentration : 31.9 gm/dL  Auto Neutrophil # : x  Auto Lymphocyte # : x  Auto Monocyte # : x  Auto Eosinophil # : x  Auto Basophil # : x  Auto Neutrophil % : x  Auto Lymphocyte % : x  Auto Monocyte % : x  Auto Eosinophil % : x  Auto Basophil % : x    16 Jul 2020 08:39    140    |  108    |  11     ----------------------------<  91     3.9     |  26     |  0.76     Ca    8.5        16 Jul 2020 08:39    TPro  7.7    /  Alb  2.9    /  TBili  0.2    /  DBili  x      /  AST  17     /  ALT  26     /  AlkPhos  61     16 Jul 2020 08:39        CAPILLARY BLOOD GLUCOSE            Culture - Blood (collected 07-14-20 @ 19:04)  Source: .Blood Blood  Preliminary Report (07-15-20 @ 20:01):    No growth to date.    Culture - Blood (collected 07-14-20 @ 19:04)  Source: .Blood Blood  Preliminary Report (07-15-20 @ 20:01):    No growth to date.        RADIOLOGY & ADDITIONAL TESTS:    Personally reviewed.     Consultant(s) Notes Reviewed:  [x] YES  [ ] NO

## 2020-07-19 LAB
CULTURE RESULTS: SIGNIFICANT CHANGE UP
SPECIMEN SOURCE: SIGNIFICANT CHANGE UP

## 2021-08-19 ENCOUNTER — APPOINTMENT (OUTPATIENT)
Dept: INTERNAL MEDICINE | Facility: CLINIC | Age: 51
End: 2021-08-19
Payer: MEDICAID

## 2021-08-19 VITALS
RESPIRATION RATE: 14 BRPM | DIASTOLIC BLOOD PRESSURE: 90 MMHG | TEMPERATURE: 97.2 F | HEART RATE: 97 BPM | SYSTOLIC BLOOD PRESSURE: 126 MMHG | OXYGEN SATURATION: 97 %

## 2021-08-19 DIAGNOSIS — K52.9 NONINFECTIVE GASTROENTERITIS AND COLITIS, UNSPECIFIED: ICD-10-CM

## 2021-08-19 PROBLEM — E03.9 HYPOTHYROIDISM, UNSPECIFIED: Chronic | Status: ACTIVE | Noted: 2020-07-13

## 2021-08-19 PROCEDURE — 99214 OFFICE O/P EST MOD 30 MIN: CPT

## 2021-08-19 RX ORDER — KETOCONAZOLE 20 MG/G
2 CREAM TOPICAL TWICE DAILY
Qty: 60 | Refills: 1 | Status: DISCONTINUED | COMMUNITY
Start: 2017-07-13 | End: 2021-08-19

## 2021-08-19 RX ORDER — CLINDAMYCIN PHOSPHATE 10 MG/ML
1 LOTION TOPICAL TWICE DAILY
Qty: 1 | Refills: 2 | Status: DISCONTINUED | COMMUNITY
Start: 2017-07-13 | End: 2021-08-19

## 2021-08-19 RX ORDER — MUPIROCIN 20 MG/G
2 OINTMENT TOPICAL
Qty: 1 | Refills: 0 | Status: DISCONTINUED | COMMUNITY
Start: 2017-07-13 | End: 2021-08-19

## 2021-08-19 NOTE — ASSESSMENT
[FreeTextEntry1] : Resolving gastroenteritis\par Noncompliant with taking levothyroxine.\par Pt requests minimal lab testing because he has no insurance

## 2021-08-19 NOTE — HISTORY OF PRESENT ILLNESS
[FreeTextEntry8] : Pt c/o N/V and diarrhea for 5-6 days. Pt is taking Immodium with cessation of diarrhea. Last diarrhea was yesterday.\par Not nauseous today. Last vomiting episode was yesterday. Overall feels somewhat better today.\par Pt reports that he stopped taking levothyroxine several months ago without consulting with a physician.

## 2021-08-20 NOTE — PROGRESS NOTE ADULT - PROBLEM/PLAN-2
COVID-19 Week 1 Survey      Responses   [facility] patient discharged from?  [facility]   Does the patient have one of the following disease processes/diagnoses(primary or secondary)?  COVID-19   COVID-19 underlying condition?  None [Muscular dystrophy/McArdle disease]   Call Number  Call 2   Week 1 Call successful?  No   Discharge diagnosis  COVID-19 pneumonia, POA          Cheryl Gallagher RN  
DISPLAY PLAN FREE TEXT

## 2021-08-25 LAB
ALBUMIN SERPL ELPH-MCNC: 4.3 G/DL
ALP BLD-CCNC: 52 U/L
ALT SERPL-CCNC: 18 U/L
ANION GAP SERPL CALC-SCNC: 9 MMOL/L
AST SERPL-CCNC: 17 U/L
BILIRUB SERPL-MCNC: 0.5 MG/DL
BUN SERPL-MCNC: 11 MG/DL
CALCIUM SERPL-MCNC: 9.2 MG/DL
CHLORIDE SERPL-SCNC: 104 MMOL/L
CO2 SERPL-SCNC: 28 MMOL/L
CREAT SERPL-MCNC: 1.22 MG/DL
GLUCOSE SERPL-MCNC: 83 MG/DL
POTASSIUM SERPL-SCNC: 5.1 MMOL/L
PROT SERPL-MCNC: 7 G/DL
SODIUM SERPL-SCNC: 141 MMOL/L
T3 SERPL-MCNC: 91 NG/DL
T3FREE SERPL-MCNC: 2.5 PG/ML
T4 FREE SERPL-MCNC: 0.7 NG/DL
T4 SERPL-MCNC: 3.7 UG/DL
TSH SERPL-ACNC: 4.91 UIU/ML

## 2021-10-19 ENCOUNTER — APPOINTMENT (OUTPATIENT)
Dept: INTERNAL MEDICINE | Facility: CLINIC | Age: 51
End: 2021-10-19

## 2021-11-01 ENCOUNTER — NON-APPOINTMENT (OUTPATIENT)
Age: 51
End: 2021-11-01

## 2021-11-01 ENCOUNTER — APPOINTMENT (OUTPATIENT)
Dept: INTERNAL MEDICINE | Facility: CLINIC | Age: 51
End: 2021-11-01
Payer: MEDICAID

## 2021-11-01 VITALS
WEIGHT: 245 LBS | OXYGEN SATURATION: 96 % | DIASTOLIC BLOOD PRESSURE: 100 MMHG | RESPIRATION RATE: 14 BRPM | SYSTOLIC BLOOD PRESSURE: 140 MMHG | TEMPERATURE: 97.2 F | HEART RATE: 100 BPM | BODY MASS INDEX: 36.29 KG/M2 | HEIGHT: 69 IN

## 2021-11-01 VITALS — SYSTOLIC BLOOD PRESSURE: 130 MMHG | DIASTOLIC BLOOD PRESSURE: 80 MMHG

## 2021-11-01 DIAGNOSIS — L91.0 HYPERTROPHIC SCAR: ICD-10-CM

## 2021-11-01 PROCEDURE — 93000 ELECTROCARDIOGRAM COMPLETE: CPT

## 2021-11-01 PROCEDURE — 99396 PREV VISIT EST AGE 40-64: CPT | Mod: 25

## 2021-11-01 NOTE — ASSESSMENT
[FreeTextEntry1] : EKG: SR, LA enlargement, Voltage criteria for LVH , diffuse nonspecific T-abnormality\par Malaise and fatigue\par Hypothyroidism

## 2021-11-01 NOTE — HISTORY OF PRESENT ILLNESS
[FreeTextEntry1] : Here for CPE\par Feels tired and depressed.\par Pt has received two doses of Moderna COVID vaccine [de-identified] : Doesn't smoke. Occasional alcohol.\par Doesn't exercise much.\par Hasn't had a colonoscopy\par Has received the flu vaccine

## 2021-11-01 NOTE — HEALTH RISK ASSESSMENT
[Fair] : ~his/her~ current health as fair  [Poor] : ~his/her~ mood as  poor [Yes] : Yes [] : No [de-identified] : Occasional [de-identified] : Doesn't exercise much

## 2021-11-03 LAB
ALBUMIN SERPL ELPH-MCNC: 4.7 G/DL
ALP BLD-CCNC: 53 U/L
ALT SERPL-CCNC: 23 U/L
ANION GAP SERPL CALC-SCNC: 17 MMOL/L
APPEARANCE: CLEAR
AST SERPL-CCNC: 24 U/L
BASOPHILS # BLD AUTO: 0.06 K/UL
BASOPHILS NFR BLD AUTO: 0.9 %
BILIRUB SERPL-MCNC: 0.7 MG/DL
BILIRUBIN URINE: NEGATIVE
BLOOD URINE: NEGATIVE
BUN SERPL-MCNC: 14 MG/DL
CALCIUM SERPL-MCNC: 9.5 MG/DL
CHLORIDE SERPL-SCNC: 103 MMOL/L
CHOLEST SERPL-MCNC: 141 MG/DL
CO2 SERPL-SCNC: 20 MMOL/L
COLOR: NORMAL
COVID-19 NUCLEOCAPSID  GAM ANTIBODY INTERPRETATION: POSITIVE
COVID-19 SPIKE DOMAIN ANTIBODY INTERPRETATION: POSITIVE
CREAT SERPL-MCNC: 1.03 MG/DL
EOSINOPHIL # BLD AUTO: 0.17 K/UL
EOSINOPHIL NFR BLD AUTO: 2.6 %
ESTIMATED AVERAGE GLUCOSE: 134 MG/DL
FOLATE SERPL-MCNC: 7.5 NG/ML
GLUCOSE QUALITATIVE U: NEGATIVE
GLUCOSE SERPL-MCNC: 92 MG/DL
HBA1C MFR BLD HPLC: 6.3 %
HCT VFR BLD CALC: 47.1 %
HDLC SERPL-MCNC: 33 MG/DL
HGB BLD-MCNC: 14.8 G/DL
IMM GRANULOCYTES NFR BLD AUTO: 0.3 %
KETONES URINE: NEGATIVE
LDLC SERPL CALC-MCNC: 80 MG/DL
LEUKOCYTE ESTERASE URINE: NEGATIVE
LYMPHOCYTES # BLD AUTO: 2.29 K/UL
LYMPHOCYTES NFR BLD AUTO: 35.2 %
MAN DIFF?: NORMAL
MCHC RBC-ENTMCNC: 27.9 PG
MCHC RBC-ENTMCNC: 31.4 GM/DL
MCV RBC AUTO: 88.9 FL
MONOCYTES # BLD AUTO: 0.64 K/UL
MONOCYTES NFR BLD AUTO: 9.8 %
NEUTROPHILS # BLD AUTO: 3.32 K/UL
NEUTROPHILS NFR BLD AUTO: 51.2 %
NITRITE URINE: NEGATIVE
NONHDLC SERPL-MCNC: 108 MG/DL
PH URINE: 6
PLATELET # BLD AUTO: 224 K/UL
POTASSIUM SERPL-SCNC: 4.4 MMOL/L
PROT SERPL-MCNC: 7.5 G/DL
PROTEIN URINE: NORMAL
PSA SERPL-MCNC: 0.74 NG/ML
RBC # BLD: 5.3 M/UL
RBC # FLD: 13.1 %
SARS-COV-2 AB SERPL IA-ACNC: >250 U/ML
SARS-COV-2 AB SERPL QL IA: 21.4 INDEX
SODIUM SERPL-SCNC: 140 MMOL/L
SPECIFIC GRAVITY URINE: 1.02
TRIGL SERPL-MCNC: 139 MG/DL
TSH SERPL-ACNC: 3.77 UIU/ML
UROBILINOGEN URINE: NORMAL
VIT B12 SERPL-MCNC: 715 PG/ML
WBC # FLD AUTO: 6.5 K/UL

## 2021-11-08 ENCOUNTER — TRANSCRIPTION ENCOUNTER (OUTPATIENT)
Age: 51
End: 2021-11-08

## 2022-01-20 ENCOUNTER — TRANSCRIPTION ENCOUNTER (OUTPATIENT)
Age: 52
End: 2022-01-20

## 2022-01-27 ENCOUNTER — TRANSCRIPTION ENCOUNTER (OUTPATIENT)
Age: 52
End: 2022-01-27

## 2022-03-01 ENCOUNTER — TRANSCRIPTION ENCOUNTER (OUTPATIENT)
Age: 52
End: 2022-03-01

## 2022-03-17 ENCOUNTER — APPOINTMENT (OUTPATIENT)
Dept: INTERNAL MEDICINE | Facility: CLINIC | Age: 52
End: 2022-03-17
Payer: MEDICAID

## 2022-03-17 VITALS
HEIGHT: 69 IN | BODY MASS INDEX: 36.14 KG/M2 | OXYGEN SATURATION: 98 % | RESPIRATION RATE: 14 BRPM | SYSTOLIC BLOOD PRESSURE: 128 MMHG | DIASTOLIC BLOOD PRESSURE: 78 MMHG | WEIGHT: 244 LBS | HEART RATE: 88 BPM | TEMPERATURE: 98.2 F

## 2022-03-17 DIAGNOSIS — R53.83 OTHER MALAISE: ICD-10-CM

## 2022-03-17 DIAGNOSIS — R53.81 OTHER MALAISE: ICD-10-CM

## 2022-03-17 PROCEDURE — 99213 OFFICE O/P EST LOW 20 MIN: CPT

## 2022-03-21 ENCOUNTER — TRANSCRIPTION ENCOUNTER (OUTPATIENT)
Age: 52
End: 2022-03-21

## 2022-03-25 ENCOUNTER — TRANSCRIPTION ENCOUNTER (OUTPATIENT)
Age: 52
End: 2022-03-25

## 2022-04-05 ENCOUNTER — TRANSCRIPTION ENCOUNTER (OUTPATIENT)
Age: 52
End: 2022-04-05

## 2022-04-07 ENCOUNTER — TRANSCRIPTION ENCOUNTER (OUTPATIENT)
Age: 52
End: 2022-04-07

## 2022-04-07 DIAGNOSIS — F32.A DEPRESSION, UNSPECIFIED: ICD-10-CM

## 2022-04-17 ENCOUNTER — INPATIENT (INPATIENT)
Facility: HOSPITAL | Age: 52
LOS: 1 days | Discharge: ANOTHER HOSPITAL | DRG: 282 | End: 2022-04-19
Attending: STUDENT IN AN ORGANIZED HEALTH CARE EDUCATION/TRAINING PROGRAM | Admitting: STUDENT IN AN ORGANIZED HEALTH CARE EDUCATION/TRAINING PROGRAM
Payer: MEDICAID

## 2022-04-17 VITALS
DIASTOLIC BLOOD PRESSURE: 97 MMHG | TEMPERATURE: 96 F | OXYGEN SATURATION: 96 % | WEIGHT: 259.93 LBS | HEIGHT: 71 IN | SYSTOLIC BLOOD PRESSURE: 179 MMHG | HEART RATE: 102 BPM | RESPIRATION RATE: 24 BRPM

## 2022-04-17 DIAGNOSIS — Z98.890 OTHER SPECIFIED POSTPROCEDURAL STATES: Chronic | ICD-10-CM

## 2022-04-17 DIAGNOSIS — R09.89 OTHER SPECIFIED SYMPTOMS AND SIGNS INVOLVING THE CIRCULATORY AND RESPIRATORY SYSTEMS: ICD-10-CM

## 2022-04-17 DIAGNOSIS — R06.02 SHORTNESS OF BREATH: ICD-10-CM

## 2022-04-17 LAB
APTT BLD: 30.5 SEC — SIGNIFICANT CHANGE UP (ref 27.5–35.5)
BASOPHILS # BLD AUTO: 0.04 K/UL — SIGNIFICANT CHANGE UP (ref 0–0.2)
BASOPHILS NFR BLD AUTO: 0.5 % — SIGNIFICANT CHANGE UP (ref 0–2)
CK MB BLD-MCNC: 1.5 % — SIGNIFICANT CHANGE UP (ref 0–3.5)
CK MB CFR SERPL CALC: 2.9 NG/ML — SIGNIFICANT CHANGE UP (ref 0–3.6)
CK SERPL-CCNC: 189 U/L — SIGNIFICANT CHANGE UP (ref 26–308)
D DIMER BLD IA.RAPID-MCNC: 496 NG/ML DDU — HIGH
EOSINOPHIL # BLD AUTO: 0.09 K/UL — SIGNIFICANT CHANGE UP (ref 0–0.5)
EOSINOPHIL NFR BLD AUTO: 1.2 % — SIGNIFICANT CHANGE UP (ref 0–6)
HCT VFR BLD CALC: 46.5 % — SIGNIFICANT CHANGE UP (ref 39–50)
HGB BLD-MCNC: 15.3 G/DL — SIGNIFICANT CHANGE UP (ref 13–17)
IMM GRANULOCYTES NFR BLD AUTO: 0.3 % — SIGNIFICANT CHANGE UP (ref 0–1.5)
INR BLD: 1.36 RATIO — HIGH (ref 0.88–1.16)
LYMPHOCYTES # BLD AUTO: 2.02 K/UL — SIGNIFICANT CHANGE UP (ref 1–3.3)
LYMPHOCYTES # BLD AUTO: 26.8 % — SIGNIFICANT CHANGE UP (ref 13–44)
MCHC RBC-ENTMCNC: 28.4 PG — SIGNIFICANT CHANGE UP (ref 27–34)
MCHC RBC-ENTMCNC: 32.9 GM/DL — SIGNIFICANT CHANGE UP (ref 32–36)
MCV RBC AUTO: 86.3 FL — SIGNIFICANT CHANGE UP (ref 80–100)
MONOCYTES # BLD AUTO: 0.6 K/UL — SIGNIFICANT CHANGE UP (ref 0–0.9)
MONOCYTES NFR BLD AUTO: 7.9 % — SIGNIFICANT CHANGE UP (ref 2–14)
NEUTROPHILS # BLD AUTO: 4.78 K/UL — SIGNIFICANT CHANGE UP (ref 1.8–7.4)
NEUTROPHILS NFR BLD AUTO: 63.3 % — SIGNIFICANT CHANGE UP (ref 43–77)
NRBC # BLD: 0 /100 WBCS — SIGNIFICANT CHANGE UP (ref 0–0)
NT-PROBNP SERPL-SCNC: 3603 PG/ML — HIGH (ref 0–125)
PLATELET # BLD AUTO: 205 K/UL — SIGNIFICANT CHANGE UP (ref 150–400)
PROTHROM AB SERPL-ACNC: 16 SEC — HIGH (ref 10.5–13.4)
RBC # BLD: 5.39 M/UL — SIGNIFICANT CHANGE UP (ref 4.2–5.8)
RBC # FLD: 13.6 % — SIGNIFICANT CHANGE UP (ref 10.3–14.5)
SARS-COV-2 RNA SPEC QL NAA+PROBE: SIGNIFICANT CHANGE UP
WBC # BLD: 7.55 K/UL — SIGNIFICANT CHANGE UP (ref 3.8–10.5)
WBC # FLD AUTO: 7.55 K/UL — SIGNIFICANT CHANGE UP (ref 3.8–10.5)

## 2022-04-17 PROCEDURE — 99223 1ST HOSP IP/OBS HIGH 75: CPT | Mod: GC

## 2022-04-17 PROCEDURE — 93010 ELECTROCARDIOGRAM REPORT: CPT

## 2022-04-17 PROCEDURE — 71275 CT ANGIOGRAPHY CHEST: CPT | Mod: 26,MA

## 2022-04-17 PROCEDURE — 99285 EMERGENCY DEPT VISIT HI MDM: CPT

## 2022-04-17 PROCEDURE — 71045 X-RAY EXAM CHEST 1 VIEW: CPT | Mod: 26

## 2022-04-17 RX ORDER — FUROSEMIDE 40 MG
40 TABLET ORAL ONCE
Refills: 0 | Status: COMPLETED | OUTPATIENT
Start: 2022-04-17 | End: 2022-04-17

## 2022-04-17 RX ORDER — ENOXAPARIN SODIUM 100 MG/ML
40 INJECTION SUBCUTANEOUS EVERY 12 HOURS
Refills: 0 | Status: DISCONTINUED | OUTPATIENT
Start: 2022-04-17 | End: 2022-04-19

## 2022-04-17 RX ORDER — ONDANSETRON 8 MG/1
4 TABLET, FILM COATED ORAL EVERY 8 HOURS
Refills: 0 | Status: DISCONTINUED | OUTPATIENT
Start: 2022-04-17 | End: 2022-04-19

## 2022-04-17 RX ORDER — LEVOTHYROXINE SODIUM 125 MCG
50 TABLET ORAL DAILY
Refills: 0 | Status: DISCONTINUED | OUTPATIENT
Start: 2022-04-17 | End: 2022-04-19

## 2022-04-17 RX ORDER — ACETAMINOPHEN 500 MG
650 TABLET ORAL EVERY 6 HOURS
Refills: 0 | Status: DISCONTINUED | OUTPATIENT
Start: 2022-04-17 | End: 2022-04-19

## 2022-04-17 RX ORDER — LANOLIN ALCOHOL/MO/W.PET/CERES
3 CREAM (GRAM) TOPICAL AT BEDTIME
Refills: 0 | Status: DISCONTINUED | OUTPATIENT
Start: 2022-04-17 | End: 2022-04-19

## 2022-04-17 RX ADMIN — Medication 40 MILLIGRAM(S): at 21:32
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
## 2022-04-17 NOTE — ED PROVIDER NOTE - OBJECTIVE STATEMENT
pt is a 50yo male with no significant pmhx former smoker presents with sob. pt reports sob worsening on exertion and when sitting/sleeping but not laying particularly. pt reports he never had symptoms in the past. pt is covid vaccinated. pt denies fever, cp, cough, n/v/d, le swelling, hx of dvt/pe, recent travel.

## 2022-04-17 NOTE — H&P ADULT - NSHPPHYSICALEXAM_GEN_ALL_CORE
T(C): 35.7 (04-17-22 @ 17:48), Max: 35.7 (04-17-22 @ 17:48)  HR: 102 (04-17-22 @ 17:48) (102 - 102)  BP: 179/97 (04-17-22 @ 17:48) (179/97 - 179/97)  RR: 24 (04-17-22 @ 17:48) (24 - 24)  SpO2: 96% (04-17-22 @ 17:48) (96% - 96%)    GENERAL: patient appears well, no acute distress, appropriately interactive, sitting upright in hospital bed  EYES: sclera clear, no exudates  ENMT: oropharynx clear without erythema, no exudates, moist mucous membranes  LUNGS: good air entry bilaterally, clear to auscultation, symmetric breath sounds, no wheezing or rhonchi appreciated  HEART: soft S1/S2, regular rate and rhythm, no murmurs noted, trace pitting lower extremity edema  GASTROINTESTINAL: abdomen is soft, nontender, nondistended, normoactive bowel sounds  INTEGUMENT: good skin turgor, warm skin, appears well perfused  MUSCULOSKELETAL: no clubbing or cyanosis, no obvious deformity  NEUROLOGIC: awake, alert, oriented x3, good muscle tone in all 4 extremities  HEME/LYMPH: no obvious ecchymosis or petechiae T(C): 35.7 (04-17-22 @ 17:48), Max: 35.7 (04-17-22 @ 17:48)  HR: 102 (04-17-22 @ 17:48) (102 - 102)  BP: 179/97 (04-17-22 @ 17:48) (179/97 - 179/97)  RR: 24 (04-17-22 @ 17:48) (24 - 24)  SpO2: 96% (04-17-22 @ 17:48) (96% - 96%)    GENERAL: patient appears well, no acute distress, appropriately interactive, sitting upright in hospital bed  EYES: sclera clear, no exudates  ENMT: oropharynx clear without erythema, no exudates, moist mucous membranes  LUNGS: decreased breath sounds bibasilarly  HEART: soft S1/S2, regular rate and rhythm, no murmurs noted, trace pitting lower extremity edema  GASTROINTESTINAL: abdomen is soft, nontender, nondistended, normoactive bowel sounds  INTEGUMENT: good skin turgor, warm skin, appears well perfused  MUSCULOSKELETAL: no clubbing or cyanosis, no obvious deformity  NEUROLOGIC: awake, alert, oriented x3, good muscle tone in all 4 extremities  HEME/LYMPH: no obvious ecchymosis or petechiae

## 2022-04-17 NOTE — H&P ADULT - NSHPLABSRESULTS_GEN_ALL_CORE
.  Labs, Imaging and EKG all personally reviewed by the attending physician. .  < from: CT Angio Chest PE Protocol w/ IV Cont (04.17.22 @ 20:41) >    ACC: [id number] EXAM:  CT ANGIO CHEST PULM TONY JORGE                          PROCEDURE DATE:  04/17/2022      INTERPRETATION:  CLINICAL INFORMATION: Shortness of breath. Evaluate for   pulmonary embolism.    COMPARISON: None.    CONTRAST/COMPLICATIONS:  IV Contrast: Omnipaque 350  90 cc administered   10 cc discarded  Oral Contrast: NONE  Complications: None reported at time of study completion    PROCEDURE:  CT Angiography of the Chest.  Sagittal and coronal reformats were performed as wellas 3D (MIP)   reconstructions.    FINDINGS:    LUNGS AND AIRWAYS: Patent central airways.  Nodular blunting of the right   costophrenic angle. Lobulated opacity in the right lower lobe, loculated   pleural effusion or lesion.  PLEURA: No pleural effusion.  MEDIASTINUM AND CHITRA: Scattered prominent mediastinal and hilar lymph   nodes. Representative precarinal lymph node measures 1.9 x 1.3 cm.  VESSELS: No pulmonary embolism though evaluation of subsegmental branches   limited secondary to respiratory motion artifact.  HEART: Cardiomegaly. No pericardial effusion.  CHEST WALL AND LOWER NECK: Within normal limits.  VISUALIZED UPPER ABDOMEN: Reflux of contrast visualized within prominent   hepatic vein and superior hepatic IVC, concerning for cardiac dysfunction.  BONES: Mild multilevel degenerative changes of the spine.    IMPRESSION:    No pulmonary embolism though evaluation of subsegmental branches limited   secondary to respiratory motion artifact.    Nodular blunting of the right costophrenic angle. Lobulated opacity in   the right lower lobe, likely loculated pleural effusion. Correlate with   prior studies and consider short-term follow-up in 6 weeks to demonstrate   resolution as neoplasm difficult to exclude.    Scattered prominent mediastinal and hilar lymph nodes. Representative   precarinal lymph node measures 1.9 x 1.3 cm. This may be infectious,   inflammatory or neoplastic. Consider nonemergent correlation with PET/CT   and short-term imaging follow-up is advised.    --- End of Report ---    DELFINO BOLES MD; Attending Radiologist  This document has been electronically signed. Apr 17 2022  9:17PM    < end of copied text >    Labs, Imaging and EKG all personally reviewed by the attending physician.

## 2022-04-17 NOTE — ED ADULT NURSE NOTE - SKIN TURGOR
[General Appearance - Well Developed] : well developed [Normal Appearance] : normal appearance [Well Groomed] : well groomed [General Appearance - Well Nourished] : well nourished [No Deformities] : no deformities [Normal Conjunctiva] : the conjunctiva exhibited no abnormalities [General Appearance - In No Acute Distress] : no acute distress resilient/elastic [Eyelids - No Xanthelasma] : the eyelids demonstrated no xanthelasmas [Normal Oropharynx] : normal oropharynx [Neck Appearance] : the appearance of the neck was normal [Neck Cervical Mass (___cm)] : no neck mass was observed [Thyroid Diffuse Enlargement] : the thyroid was not enlarged [Jugular Venous Distention Increased] : there was no jugular-venous distention [Thyroid Nodule] : there were no palpable thyroid nodules [Heart Sounds] : normal S1 and S2 [Heart Rate And Rhythm] : heart rate and rhythm were normal [Murmurs] : no murmurs present [Respiration, Rhythm And Depth] : normal respiratory rhythm and effort [Exaggerated Use Of Accessory Muscles For Inspiration] : no accessory muscle use [Auscultation Breath Sounds / Voice Sounds] : lungs were clear to auscultation bilaterally [Abdomen Tenderness] : non-tender [Abdomen Soft] : soft [Abdomen Mass (___ Cm)] : no abdominal mass palpated [Abnormal Walk] : normal gait [Gait - Sufficient For Exercise Testing] : the gait was sufficient for exercise testing [Cyanosis, Localized] : no localized cyanosis [Nail Clubbing] : no clubbing of the fingernails [] : no ischemic changes [Petechial Hemorrhages (___cm)] : no petechial hemorrhages [Deep Tendon Reflexes (DTR)] : deep tendon reflexes were 2+ and symmetric [No Focal Deficits] : no focal deficits [Sensation] : the sensory exam was normal to light touch and pinprick [FreeTextEntry1] : scars
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
## 2022-04-17 NOTE — ED PROVIDER NOTE - INTERNATIONAL TRAVEL
“You can access the FollowHealth Patient Portal, offered by [facility], by registering with the following website: [email or web address]”
No

## 2022-04-17 NOTE — ED PROVIDER NOTE - NS ED ATTENDING STATEMENT MOD
This was a shared visit with the BARBARA. I reviewed and verified the documentation and independently performed the documented:
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
## 2022-04-17 NOTE — ED ADULT NURSE NOTE - CCCP TRG CHIEF CMPLNT
shortness of breath 48 yo F with PMHx with Wernicke Encephalopathy secondary to ETOH Abuse last admitted in August 2018 for AMS secondary to Wernicke Encephalopathy BIBA s/p witnessed fall by bystander.      # Encephalopathy vs chronic neurocognitive impairment of unknown etiology -   Patient has a history of Wenicke's encephalopathy and has notable horizontal nystagmus on my exam. Patient has been in the hospital since May 6 (5 days), therefore, she is likely not acutely withdrawal from alcohol. She had a normal thiamine level in the context of receiving IV thiamine later and was not fully treated for Wernicke's encephalopathy. She is not able to complete CIWA score due to lack of reliable reporting.   - Psychiatry saw patient, recommended full treatment of Wenicke. Unclear to me how much of this is chronic vs reversible. I don't know her baseline. Thiamine 500 mg IV Q8H for 2 days (until 5/13/19). Then 250 mg IV Q8H for 5 days (until Friday May 17, 2019). This regimen was also recommended by Toxicology service.   - Will follow up on BZD taper, not recommended to keep patient on standing benzodiazepine as this is likely worsening her neurocognitive impairment, regardless of etiology.    -B12, syphilis, Mg, lytes wnl. Continue to follow - daily CBC and BMP/LFTs   - Contact family, discuss discharge plan and baseline.   - Follow up electrolytes     # NSTEMI  -Echo with ?new reduced EF 30-35%, ?alcohol induced cardiomyopathy vs ischemic in nature  -Cardio recs appreciated: not candidate for invasive workup  -c/w metoprolol 12.5 BID   -If BP remains stable will start low dose ACEI   -c/w lipitor  -c/w ASA     # Thrombocytopenia- Improving  - Acute drop likely secondary to dilution after IVFs  - Previously 275 in August   - No S&S of bleeding   - monitor for now    # Suspected malnourishment  -thin frail appearing  -c/w ensure daily    # DVT ppx  -Pt with low plt and ambulating  -c/w holding pharmacological ppx for now  -SCDs when in bed    # Diet  -regular, ensure once daily    # Activity  -on 1:1 observation for elopement risk  -ambulate with staff    # Code Status  -Full Code    # Dispo  -f/u with family/SW for discharge plans 48 yo F with PMHx with Wernicke Encephalopathy secondary to ETOH Abuse last admitted in August 2018 for AMS secondary to Wernicke Encephalopathy BIBA s/p witnessed fall by bystander.      # Encephalopathy vs chronic neurocognitive impairment of unknown etiology -   Patient has a history of Wenicke's encephalopathy and has notable horizontal nystagmus on my exam. Patient has been in the hospital since May 6 (5 days), therefore, she is likely not acutely withdrawal from alcohol. She had a normal thiamine level in the context of receiving IV thiamine later and was not fully treated for Wernicke's encephalopathy. She is not able to complete CIWA score due to lack of reliable reporting.   - Psychiatry saw patient, recommended full treatment of Wenicke. Unclear to me how much of this is chronic vs reversible. I don't know her baseline. Thiamine 500 mg IV Q8H for 2 days (until 5/13/19). Then 250 mg IV Q8H for 5 days (until Friday May 17, 2019). This regimen was also recommended by Toxicology service.   - I place the patient on a Ativan taper 2->1.5->1->.5 not recommended to keep patient on standing benzodiazepine as this is likely worsening her neurocognitive impairment, regardless of etiology.    -B12, syphilis, Mg, lytes wnl. Continue to follow - daily CBC and BMP/LFTs   - Contact family, discuss discharge plan and baseline.   - Follow up electrolytes     # NSTEMI  -Echo with ?new reduced EF 30-35%, ?alcohol induced cardiomyopathy vs ischemic in nature  -Cardio recs appreciated: not candidate for invasive workup  -c/w metoprolol 12.5 BID   -If BP remains stable will start low dose ACEI   -c/w lipitor  -c/w ASA     # Thrombocytopenia- Improving  - Acute drop likely secondary to dilution after IVFs  - Previously 275 in August   - No S&S of bleeding   - monitor for now    # Suspected malnourishment  -thin frail appearing  -c/w ensure daily    # DVT ppx  -Pt with low plt and ambulating  -c/w holding pharmacological ppx for now  -SCDs when in bed    # Diet  -regular, ensure once daily    # Activity  -on 1:1 observation for elopement risk  -ambulate with staff    # Code Status  -Full Code    # Dispo  -f/u with family/SW for discharge plans

## 2022-04-17 NOTE — ED PROVIDER NOTE - CLINICAL SUMMARY MEDICAL DECISION MAKING FREE TEXT BOX
tan barton: pt is a 52yo male with sob. possible chf? will do labs, d-dimer, trop, ekg, cxr, consider admission pt is a 52yo male with no significant pmhx former smoker presents with sob. pt reports sob worsening on exertion and when sitting/sleeping but not laying particularly. pt reports he never had symptoms in the past. pt is covid vaccinated. pt denies fever, cp, cough, n/v/d, le swelling, hx of dvt/pe, recent travel.  pt on exam in [place], lung with basilar rales, no jvd, cor: rrr no mgr, ext: no c/c/e, 2+ pulses, ekg non acute,  cxr with elevated probnp, cxr with ? pvc,  admit tele,cards consult for am

## 2022-04-17 NOTE — H&P ADULT - NSHPREVIEWOFSYSTEMS_GEN_ALL_CORE
CONSTITUTIONAL: No weakness, fevers or chills  EYES/ENT: No visual changes;  No vertigo or throat pain   NECK: No pain or stiffness  RESPIRATORY: No cough, wheezing, hemoptysis; admits to shortness of breath on exertion  CARDIOVASCULAR: No chest pain or palpitations  GASTROINTESTINAL: No abdominal or epigastric pain. No nausea, vomiting, or hematemesis; No diarrhea or constipation. No melena or hematochezia.  GENITOURINARY: No dysuria, frequency or hematuria  NEUROLOGICAL: No numbness or weakness  SKIN: No itching, burning, rashes, or lesions   All other review of systems is negative unless indicated above. CONSTITUTIONAL: No weakness, fevers or chills  EYES/ENT: No visual changes;  No vertigo or throat pain   NECK: No pain or stiffness  RESPIRATORY: No cough, wheezing, hemoptysis; admits to shortness of breath  CARDIOVASCULAR: No chest pain or palpitations; admits dyspnea on exertion, orthopnea, PND, peripheral edema  GASTROINTESTINAL: No abdominal or epigastric pain. No nausea, vomiting, or hematemesis; No diarrhea or constipation. No melena or hematochezia.  GENITOURINARY: No dysuria, frequency or hematuria  NEUROLOGICAL: No numbness or weakness  SKIN: No itching, burning, rashes, or lesions

## 2022-04-17 NOTE — ED PROVIDER NOTE - PROGRESS NOTE DETAILS
ct and labs reviewed. dr montesinos cardio discussed pt over phone advised admit for hf evaluation. hospitalist will kindly admit

## 2022-04-17 NOTE — H&P ADULT - NSHPSOCIALHISTORY_GEN_ALL_CORE
Lives at home with adult children, ambulates unassisted, former smoker, denies etoh and other recreational drug use

## 2022-04-17 NOTE — ED ADULT NURSE NOTE - OBJECTIVE STATEMENT
Patient arrives alert and oriented c/o SOB x 3 days accompanied with weakness. Patient O2 on A %, appears comfortable, respirations even and unlabored, + ROM, + radial pulses b/l. Labs collected and sent, pending results. Covid swab sent.
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
## 2022-04-18 LAB
ALBUMIN SERPL ELPH-MCNC: 3.6 G/DL — SIGNIFICANT CHANGE UP (ref 3.3–5)
ALP SERPL-CCNC: 48 U/L — SIGNIFICANT CHANGE UP (ref 40–120)
ALT FLD-CCNC: 54 U/L — SIGNIFICANT CHANGE UP (ref 12–78)
ANION GAP SERPL CALC-SCNC: 8 MMOL/L — SIGNIFICANT CHANGE UP (ref 5–17)
AST SERPL-CCNC: 31 U/L — SIGNIFICANT CHANGE UP (ref 15–37)
BASOPHILS # BLD AUTO: 0.05 K/UL — SIGNIFICANT CHANGE UP (ref 0–0.2)
BASOPHILS NFR BLD AUTO: 0.7 % — SIGNIFICANT CHANGE UP (ref 0–2)
BILIRUB SERPL-MCNC: 1.4 MG/DL — HIGH (ref 0.2–1.2)
BUN SERPL-MCNC: 16 MG/DL — SIGNIFICANT CHANGE UP (ref 7–23)
CALCIUM SERPL-MCNC: 9.1 MG/DL — SIGNIFICANT CHANGE UP (ref 8.5–10.1)
CHLORIDE SERPL-SCNC: 104 MMOL/L — SIGNIFICANT CHANGE UP (ref 96–108)
CO2 SERPL-SCNC: 30 MMOL/L — SIGNIFICANT CHANGE UP (ref 22–31)
CREAT SERPL-MCNC: 1.3 MG/DL — SIGNIFICANT CHANGE UP (ref 0.5–1.3)
EGFR: 66 ML/MIN/1.73M2 — SIGNIFICANT CHANGE UP
EOSINOPHIL # BLD AUTO: 0.1 K/UL — SIGNIFICANT CHANGE UP (ref 0–0.5)
EOSINOPHIL NFR BLD AUTO: 1.4 % — SIGNIFICANT CHANGE UP (ref 0–6)
GLUCOSE SERPL-MCNC: 125 MG/DL — HIGH (ref 70–99)
HCT VFR BLD CALC: 48.2 % — SIGNIFICANT CHANGE UP (ref 39–50)
HGB BLD-MCNC: 15.9 G/DL — SIGNIFICANT CHANGE UP (ref 13–17)
IMM GRANULOCYTES NFR BLD AUTO: 0.3 % — SIGNIFICANT CHANGE UP (ref 0–1.5)
LYMPHOCYTES # BLD AUTO: 2.33 K/UL — SIGNIFICANT CHANGE UP (ref 1–3.3)
LYMPHOCYTES # BLD AUTO: 33.2 % — SIGNIFICANT CHANGE UP (ref 13–44)
MAGNESIUM SERPL-MCNC: 2.1 MG/DL — SIGNIFICANT CHANGE UP (ref 1.6–2.6)
MCHC RBC-ENTMCNC: 28.2 PG — SIGNIFICANT CHANGE UP (ref 27–34)
MCHC RBC-ENTMCNC: 33 GM/DL — SIGNIFICANT CHANGE UP (ref 32–36)
MCV RBC AUTO: 85.6 FL — SIGNIFICANT CHANGE UP (ref 80–100)
MONOCYTES # BLD AUTO: 0.4 K/UL — SIGNIFICANT CHANGE UP (ref 0–0.9)
MONOCYTES NFR BLD AUTO: 5.7 % — SIGNIFICANT CHANGE UP (ref 2–14)
NEUTROPHILS # BLD AUTO: 4.12 K/UL — SIGNIFICANT CHANGE UP (ref 1.8–7.4)
NEUTROPHILS NFR BLD AUTO: 58.7 % — SIGNIFICANT CHANGE UP (ref 43–77)
NRBC # BLD: 0 /100 WBCS — SIGNIFICANT CHANGE UP (ref 0–0)
PHOSPHATE SERPL-MCNC: 3.8 MG/DL — SIGNIFICANT CHANGE UP (ref 2.5–4.5)
PLATELET # BLD AUTO: 207 K/UL — SIGNIFICANT CHANGE UP (ref 150–400)
POTASSIUM SERPL-MCNC: 3.8 MMOL/L — SIGNIFICANT CHANGE UP (ref 3.5–5.3)
POTASSIUM SERPL-SCNC: 3.8 MMOL/L — SIGNIFICANT CHANGE UP (ref 3.5–5.3)
PROT SERPL-MCNC: 6.6 G/DL — SIGNIFICANT CHANGE UP (ref 6–8.3)
RBC # BLD: 5.63 M/UL — SIGNIFICANT CHANGE UP (ref 4.2–5.8)
RBC # FLD: 13.5 % — SIGNIFICANT CHANGE UP (ref 10.3–14.5)
SODIUM SERPL-SCNC: 142 MMOL/L — SIGNIFICANT CHANGE UP (ref 135–145)
T4 AB SER-ACNC: 4.5 UG/DL — LOW (ref 4.6–12)
TSH SERPL-MCNC: 4.52 UIU/ML — HIGH (ref 0.36–3.74)
WBC # BLD: 7.02 K/UL — SIGNIFICANT CHANGE UP (ref 3.8–10.5)
WBC # FLD AUTO: 7.02 K/UL — SIGNIFICANT CHANGE UP (ref 3.8–10.5)

## 2022-04-18 PROCEDURE — 99223 1ST HOSP IP/OBS HIGH 75: CPT

## 2022-04-18 PROCEDURE — 99232 SBSQ HOSP IP/OBS MODERATE 35: CPT

## 2022-04-18 RX ORDER — FUROSEMIDE 40 MG
80 TABLET ORAL ONCE
Refills: 0 | Status: COMPLETED | OUTPATIENT
Start: 2022-04-18 | End: 2022-04-18

## 2022-04-18 RX ORDER — LABETALOL HCL 100 MG
10 TABLET ORAL ONCE
Refills: 0 | Status: COMPLETED | OUTPATIENT
Start: 2022-04-18 | End: 2022-04-18

## 2022-04-18 RX ORDER — FUROSEMIDE 40 MG
40 TABLET ORAL
Refills: 0 | Status: DISCONTINUED | OUTPATIENT
Start: 2022-04-19 | End: 2022-04-19

## 2022-04-18 RX ORDER — LISINOPRIL 2.5 MG/1
20 TABLET ORAL DAILY
Refills: 0 | Status: DISCONTINUED | OUTPATIENT
Start: 2022-04-18 | End: 2022-04-19

## 2022-04-18 RX ADMIN — ENOXAPARIN SODIUM 40 MILLIGRAM(S): 100 INJECTION SUBCUTANEOUS at 18:05

## 2022-04-18 RX ADMIN — Medication 50 MICROGRAM(S): at 06:07

## 2022-04-18 RX ADMIN — Medication 80 MILLIGRAM(S): at 10:37

## 2022-04-18 RX ADMIN — ENOXAPARIN SODIUM 40 MILLIGRAM(S): 100 INJECTION SUBCUTANEOUS at 06:06

## 2022-04-18 RX ADMIN — LISINOPRIL 20 MILLIGRAM(S): 2.5 TABLET ORAL at 09:43

## 2022-04-18 NOTE — CONSULT NOTE ADULT - ATTENDING COMMENTS
Seen/examined. agree with above.   short of breath and evidence of congestive heart failure  await echocardiogram; prelim with suggestion of lv dysfunction  cont lasix  further evaluation will depend on clinical course Seen/examined. agree with above.   short of breath and evidence of congestive heart failure  await echocardiogram; prelim with suggestion of lv dysfunction  cont lasix  ekg with lvh  further evaluation will depend on clinical course
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
## 2022-04-19 ENCOUNTER — TRANSCRIPTION ENCOUNTER (OUTPATIENT)
Age: 52
End: 2022-04-19

## 2022-04-19 ENCOUNTER — INPATIENT (INPATIENT)
Facility: HOSPITAL | Age: 52
LOS: 2 days | Discharge: HOME | DRG: 286 | End: 2022-04-22
Attending: STUDENT IN AN ORGANIZED HEALTH CARE EDUCATION/TRAINING PROGRAM | Admitting: STUDENT IN AN ORGANIZED HEALTH CARE EDUCATION/TRAINING PROGRAM
Payer: MEDICAID

## 2022-04-19 VITALS
TEMPERATURE: 98 F | DIASTOLIC BLOOD PRESSURE: 98 MMHG | HEART RATE: 81 BPM | RESPIRATION RATE: 18 BRPM | OXYGEN SATURATION: 95 % | SYSTOLIC BLOOD PRESSURE: 134 MMHG

## 2022-04-19 VITALS
SYSTOLIC BLOOD PRESSURE: 133 MMHG | WEIGHT: 250 LBS | TEMPERATURE: 98 F | DIASTOLIC BLOOD PRESSURE: 100 MMHG | HEART RATE: 87 BPM | OXYGEN SATURATION: 98 % | HEIGHT: 71 IN | RESPIRATION RATE: 16 BRPM

## 2022-04-19 DIAGNOSIS — Z98.890 OTHER SPECIFIED POSTPROCEDURAL STATES: Chronic | ICD-10-CM

## 2022-04-19 DIAGNOSIS — I25.10 ATHEROSCLEROTIC HEART DISEASE OF NATIVE CORONARY ARTERY WITHOUT ANGINA PECTORIS: ICD-10-CM

## 2022-04-19 PROCEDURE — 71045 X-RAY EXAM CHEST 1 VIEW: CPT

## 2022-04-19 PROCEDURE — 80053 COMPREHEN METABOLIC PANEL: CPT

## 2022-04-19 PROCEDURE — 99152 MOD SED SAME PHYS/QHP 5/>YRS: CPT

## 2022-04-19 PROCEDURE — 84484 ASSAY OF TROPONIN QUANT: CPT

## 2022-04-19 PROCEDURE — 82550 ASSAY OF CK (CPK): CPT

## 2022-04-19 PROCEDURE — 84100 ASSAY OF PHOSPHORUS: CPT

## 2022-04-19 PROCEDURE — 93460 R&L HRT ART/VENTRICLE ANGIO: CPT | Mod: 26

## 2022-04-19 PROCEDURE — 84443 ASSAY THYROID STIM HORMONE: CPT

## 2022-04-19 PROCEDURE — 99239 HOSP IP/OBS DSCHRG MGMT >30: CPT | Mod: GC

## 2022-04-19 PROCEDURE — 99285 EMERGENCY DEPT VISIT HI MDM: CPT

## 2022-04-19 PROCEDURE — ZZZZZ: CPT

## 2022-04-19 PROCEDURE — 99232 SBSQ HOSP IP/OBS MODERATE 35: CPT

## 2022-04-19 PROCEDURE — 82553 CREATINE MB FRACTION: CPT

## 2022-04-19 PROCEDURE — 85730 THROMBOPLASTIN TIME PARTIAL: CPT

## 2022-04-19 PROCEDURE — 71275 CT ANGIOGRAPHY CHEST: CPT | Mod: MA

## 2022-04-19 PROCEDURE — 87635 SARS-COV-2 COVID-19 AMP PRB: CPT

## 2022-04-19 PROCEDURE — 83735 ASSAY OF MAGNESIUM: CPT

## 2022-04-19 PROCEDURE — 85379 FIBRIN DEGRADATION QUANT: CPT

## 2022-04-19 PROCEDURE — 85610 PROTHROMBIN TIME: CPT

## 2022-04-19 PROCEDURE — 93010 ELECTROCARDIOGRAM REPORT: CPT

## 2022-04-19 PROCEDURE — 36415 COLL VENOUS BLD VENIPUNCTURE: CPT

## 2022-04-19 PROCEDURE — 85025 COMPLETE CBC W/AUTO DIFF WBC: CPT

## 2022-04-19 PROCEDURE — 93306 TTE W/DOPPLER COMPLETE: CPT

## 2022-04-19 PROCEDURE — 93005 ELECTROCARDIOGRAM TRACING: CPT

## 2022-04-19 PROCEDURE — 84436 ASSAY OF TOTAL THYROXINE: CPT

## 2022-04-19 PROCEDURE — 83880 ASSAY OF NATRIURETIC PEPTIDE: CPT

## 2022-04-19 PROCEDURE — 93306 TTE W/DOPPLER COMPLETE: CPT | Mod: 26

## 2022-04-19 RX ORDER — LEVOTHYROXINE SODIUM 125 MCG
1 TABLET ORAL
Qty: 0 | Refills: 0 | DISCHARGE

## 2022-04-19 RX ORDER — SODIUM CHLORIDE 9 MG/ML
3 INJECTION INTRAMUSCULAR; INTRAVENOUS; SUBCUTANEOUS EVERY 8 HOURS
Refills: 0 | Status: DISCONTINUED | OUTPATIENT
Start: 2022-04-19 | End: 2022-04-22

## 2022-04-19 RX ORDER — LEVOTHYROXINE SODIUM 125 MCG
50 TABLET ORAL DAILY
Refills: 0 | Status: DISCONTINUED | OUTPATIENT
Start: 2022-04-19 | End: 2022-04-22

## 2022-04-19 RX ORDER — FUROSEMIDE 40 MG
40 TABLET ORAL
Refills: 0 | Status: DISCONTINUED | OUTPATIENT
Start: 2022-04-19 | End: 2022-04-20

## 2022-04-19 RX ORDER — LISINOPRIL 2.5 MG/1
1 TABLET ORAL
Qty: 0 | Refills: 0 | DISCHARGE
Start: 2022-04-19

## 2022-04-19 RX ORDER — FUROSEMIDE 40 MG
1 TABLET ORAL
Qty: 0 | Refills: 0 | DISCHARGE
Start: 2022-04-19

## 2022-04-19 RX ORDER — METOPROLOL TARTRATE 50 MG
50 TABLET ORAL DAILY
Refills: 0 | Status: DISCONTINUED | OUTPATIENT
Start: 2022-04-19 | End: 2022-04-20

## 2022-04-19 RX ORDER — ENOXAPARIN SODIUM 100 MG/ML
0 INJECTION SUBCUTANEOUS
Qty: 0 | Refills: 0 | DISCHARGE
Start: 2022-04-19

## 2022-04-19 RX ORDER — LISINOPRIL 2.5 MG/1
20 TABLET ORAL DAILY
Refills: 0 | Status: DISCONTINUED | OUTPATIENT
Start: 2022-04-19 | End: 2022-04-20

## 2022-04-19 RX ADMIN — Medication 50 MICROGRAM(S): at 05:54

## 2022-04-19 RX ADMIN — Medication 40 MILLIGRAM(S): at 17:45

## 2022-04-19 RX ADMIN — LISINOPRIL 20 MILLIGRAM(S): 2.5 TABLET ORAL at 05:54

## 2022-04-19 RX ADMIN — SODIUM CHLORIDE 3 MILLILITER(S): 9 INJECTION INTRAMUSCULAR; INTRAVENOUS; SUBCUTANEOUS at 17:23

## 2022-04-19 RX ADMIN — Medication 50 MILLIGRAM(S): at 17:44

## 2022-04-19 RX ADMIN — Medication 650 MILLIGRAM(S): at 05:58

## 2022-04-19 RX ADMIN — ENOXAPARIN SODIUM 40 MILLIGRAM(S): 100 INJECTION SUBCUTANEOUS at 05:54

## 2022-04-19 RX ADMIN — Medication 650 MILLIGRAM(S): at 06:30

## 2022-04-19 RX ADMIN — SODIUM CHLORIDE 3 MILLILITER(S): 9 INJECTION INTRAMUSCULAR; INTRAVENOUS; SUBCUTANEOUS at 21:17

## 2022-04-19 RX ADMIN — Medication 40 MILLIGRAM(S): at 05:54

## 2022-04-19 NOTE — DISCHARGE NOTE PROVIDER - CARE PROVIDER_API CALL
Rafael Doe)  Cardiovascular Disease  [address] [place] [address]  Phone: [phone]  Fax: [phone]  Follow Up Time: 1 week   Rafael Doe)  Cardiovascular Disease  [address] [place] [address]  Phone: [phone]  Fax: [phone]  Follow Up Time: 1 week    Abundio Landaverde)  Internal Medicine  [address] [place] [address]  Phone: [phone]  Fax: [phone]  Established Patient  Follow Up Time: 1 week

## 2022-04-19 NOTE — DISCHARGE NOTE PROVIDER - ATTENDING DISCHARGE PHYSICAL EXAMINATION:
T(C): 36.6 (04-19-22 @ 04:51), Max: 36.7 (04-18-22 @ 10:19)  HR: 81 (04-19-22 @ 04:51) (81 - 98)  BP: 134/98 (04-19-22 @ 04:51) (134/98 - 153/104)  RR: 18 (04-19-22 @ 04:51) (18 - 18)  SpO2: 95% (04-19-22 @ 04:51) (93% - 98%)    GENERAL: patient appears well, no acute distress, conversant, wearing glass  ENMT: oropharynx clear without erythema, has scar on L cheek, clean, dry, healed  LUNGS: clear to auscultation, no intercostal retractions  HEART: S1/S2, regular rate and rhythm   GASTROINTESTINAL: abdomen is soft, nontender   INTEGUMENT: good skin turgor, warm skin   MUSCULOSKELETAL: no clubbing or cyanosis, no obvious deformity  NEUROLOGIC: awake, alert, oriented x3, good muscle tone in 4 extremities, no obvious sensory deficits
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
## 2022-04-19 NOTE — DISCHARGE NOTE NURSING/CASE MANAGEMENT/SOCIAL WORK - NSDCPEFALRISK_GEN_ALL_CORE
For information on Fall & Injury Prevention, visit: [email or web address] OR  [email or web address] OR  https://www.cdc.gov/steadi/patient.html
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
## 2022-04-19 NOTE — CHART NOTE - NSCHARTNOTEFT_GEN_A_CORE
The patient was seen and examined on the day of discharge by the attending physician. Pt stable for discharge. Please see discharge note for additional information regarding the hospital course and the day of discharge.     Transfer to [facility] for cardiac cath.

## 2022-04-19 NOTE — PATIENT PROFILE ADULT - FALL HARM RISK - UNIVERSAL INTERVENTIONS
Bed in lowest position, wheels locked, appropriate side rails in place/Call bell, personal items and telephone in reach/Instruct patient to call for assistance before getting out of bed or chair/Non-slip footwear when patient is out of bed/[place] to call system/Physically safe environment - no spills, clutter or unnecessary equipment/Purposeful Proactive Rounding/Room/bathroom lighting operational, light cord in reach
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
## 2022-04-19 NOTE — DISCHARGE NOTE PROVIDER - PROVIDER TOKENS
PROVIDER:[TOKEN:[[id number]],FOLLOWUP:[1 week]] PROVIDER:[TOKEN:[[id number]],FOLLOWUP:[1 week]],PROVIDER:[TOKEN:[[id number]],FOLLOWUP:[1 week],ESTABLISHEDPATIENT:[T]]

## 2022-04-19 NOTE — H&P CARDIOLOGY - NS ATTEND AMEND GEN_ALL_CORE FT
Agree with above. Patient in decompensated heart failure with severely reduced EF. Plan for left and right heart catheterization with evaluation of filling pressures and coronary anatomy.

## 2022-04-19 NOTE — DISCHARGE NOTE PROVIDER - NSDCMRMEDTOKEN_GEN_ALL_CORE_FT
levothyroxine 50 mcg (0.05 mg) oral capsule: 1 cap(s) orally once a day   enoxaparin: once a day  furosemide 40 mg oral tablet: 1 tab(s) orally 2 times a day  levothyroxine 50 mcg (0.05 mg) oral capsule: 1 cap(s) orally once a day  lisinopril 20 mg oral tablet: 1 tab(s) orally once a day
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
## 2022-04-19 NOTE — DISCHARGE NOTE PROVIDER - NSDCFUSCHEDAPPT_GEN_ALL_CORE_FT
SAUD ARIANACHEYENNE BARAJAS ; 04/28/2022 ; [facility] [address] [facility]

## 2022-04-19 NOTE — DISCHARGE NOTE PROVIDER - NSDCCPCAREPLAN_GEN_ALL_CORE_FT
PRINCIPAL DISCHARGE DIAGNOSIS  Diagnosis: SOB (shortness of breath)  Assessment and Plan of Treatment: You likely had a heart attack. You are being transferred to [facility] for Cardiac Catheterization.      SECONDARY DISCHARGE DIAGNOSES  Diagnosis: Pleural effusion  Assessment and Plan of Treatment:      PRINCIPAL DISCHARGE DIAGNOSIS  Diagnosis: SOB (shortness of breath)  Assessment and Plan of Treatment: You likely had a heart attack. You are being transferred to [facility] for Cardiac Catheterization.      SECONDARY DISCHARGE DIAGNOSES  Diagnosis: Pleural effusion  Assessment and Plan of Treatment: You were found to have fluid in your lungs which was likely secondary to your heart not pumping properly. We believe this may be due to a heart attack causing heart dysfunction. We are transferring you to [place] for cardiac cathertization.     PRINCIPAL DISCHARGE DIAGNOSIS  Diagnosis: SOB (shortness of breath)  Assessment and Plan of Treatment: You likely had a heart attack. You are being transferred to [facility] for Cardiac Catheterization.      SECONDARY DISCHARGE DIAGNOSES  Diagnosis: Pleural effusion  Assessment and Plan of Treatment: You were found to have fluid in your lungs which was likely secondary to your heart not pumping properly. We believe this may be due to a heart attack causing heart dysfunction. We are transferring you to [place] for cardiac catherization.

## 2022-04-19 NOTE — DISCHARGE NOTE PROVIDER - CARE PROVIDERS DIRECT ADDRESSES
[email or web address]

## 2022-04-19 NOTE — DISCHARGE NOTE NURSING/CASE MANAGEMENT/SOCIAL WORK - PATIENT PORTAL LINK FT
You can access the FollowMyHealth Patient Portal offered by [facility] by registering at the following website: [email or web address]. By joining [employer]’s FollowMyHealth portal, you will also be able to view your health information using other applications (apps) compatible with our system.

## 2022-04-20 DIAGNOSIS — I50.9 HEART FAILURE, UNSPECIFIED: ICD-10-CM

## 2022-04-20 DIAGNOSIS — E03.9 HYPOTHYROIDISM, UNSPECIFIED: ICD-10-CM

## 2022-04-20 DIAGNOSIS — I25.10 ATHEROSCLEROTIC HEART DISEASE OF NATIVE CORONARY ARTERY WITHOUT ANGINA PECTORIS: ICD-10-CM

## 2022-04-20 PROCEDURE — 99223 1ST HOSP IP/OBS HIGH 75: CPT

## 2022-04-20 PROCEDURE — 75561 CARDIAC MRI FOR MORPH W/DYE: CPT | Mod: 26

## 2022-04-20 RX ORDER — HYDRALAZINE HCL 50 MG
25 TABLET ORAL EVERY 12 HOURS
Refills: 0 | Status: DISCONTINUED | OUTPATIENT
Start: 2022-04-20 | End: 2022-04-22

## 2022-04-20 RX ORDER — SPIRONOLACTONE 25 MG/1
25 TABLET, FILM COATED ORAL DAILY
Refills: 0 | Status: DISCONTINUED | OUTPATIENT
Start: 2022-04-20 | End: 2022-04-22

## 2022-04-20 RX ORDER — FUROSEMIDE 40 MG
40 TABLET ORAL EVERY 12 HOURS
Refills: 0 | Status: DISCONTINUED | OUTPATIENT
Start: 2022-04-20 | End: 2022-04-21

## 2022-04-20 RX ORDER — CARVEDILOL PHOSPHATE 80 MG/1
6.25 CAPSULE, EXTENDED RELEASE ORAL EVERY 12 HOURS
Refills: 0 | Status: DISCONTINUED | OUTPATIENT
Start: 2022-04-20 | End: 2022-04-22

## 2022-04-20 RX ORDER — ACETAMINOPHEN 500 MG
650 TABLET ORAL ONCE
Refills: 0 | Status: COMPLETED | OUTPATIENT
Start: 2022-04-20 | End: 2022-04-20

## 2022-04-20 RX ORDER — VALSARTAN 80 MG/1
80 TABLET ORAL DAILY
Refills: 0 | Status: DISCONTINUED | OUTPATIENT
Start: 2022-04-20 | End: 2022-04-20

## 2022-04-20 RX ADMIN — Medication 650 MILLIGRAM(S): at 06:28

## 2022-04-20 RX ADMIN — SODIUM CHLORIDE 3 MILLILITER(S): 9 INJECTION INTRAMUSCULAR; INTRAVENOUS; SUBCUTANEOUS at 12:15

## 2022-04-20 RX ADMIN — Medication 40 MILLIGRAM(S): at 16:45

## 2022-04-20 RX ADMIN — Medication 40 MILLIGRAM(S): at 05:44

## 2022-04-20 RX ADMIN — Medication 50 MILLIGRAM(S): at 05:44

## 2022-04-20 RX ADMIN — VALSARTAN 80 MILLIGRAM(S): 80 TABLET ORAL at 19:00

## 2022-04-20 RX ADMIN — SODIUM CHLORIDE 3 MILLILITER(S): 9 INJECTION INTRAMUSCULAR; INTRAVENOUS; SUBCUTANEOUS at 20:34

## 2022-04-20 RX ADMIN — LISINOPRIL 20 MILLIGRAM(S): 2.5 TABLET ORAL at 05:44

## 2022-04-20 RX ADMIN — Medication 50 MICROGRAM(S): at 05:43

## 2022-04-20 RX ADMIN — SODIUM CHLORIDE 3 MILLILITER(S): 9 INJECTION INTRAMUSCULAR; INTRAVENOUS; SUBCUTANEOUS at 04:57

## 2022-04-20 RX ADMIN — Medication 650 MILLIGRAM(S): at 05:56
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
## 2022-04-21 ENCOUNTER — TRANSCRIPTION ENCOUNTER (OUTPATIENT)
Age: 52
End: 2022-04-21

## 2022-04-21 DIAGNOSIS — Z29.9 ENCOUNTER FOR PROPHYLACTIC MEASURES, UNSPECIFIED: ICD-10-CM

## 2022-04-21 DIAGNOSIS — R59.0 LOCALIZED ENLARGED LYMPH NODES: ICD-10-CM

## 2022-04-21 DIAGNOSIS — I50.23 ACUTE ON CHRONIC SYSTOLIC (CONGESTIVE) HEART FAILURE: ICD-10-CM

## 2022-04-21 LAB
ANION GAP SERPL CALC-SCNC: 17 MMOL/L — SIGNIFICANT CHANGE UP (ref 5–17)
BUN SERPL-MCNC: 26 MG/DL — HIGH (ref 7–23)
CALCIUM SERPL-MCNC: 9 MG/DL — SIGNIFICANT CHANGE UP (ref 8.4–10.5)
CHLORIDE SERPL-SCNC: 99 MMOL/L — SIGNIFICANT CHANGE UP (ref 96–108)
CO2 SERPL-SCNC: 20 MMOL/L — LOW (ref 22–31)
CREAT SERPL-MCNC: 1.21 MG/DL — SIGNIFICANT CHANGE UP (ref 0.5–1.3)
EGFR: 72 ML/MIN/1.73M2 — SIGNIFICANT CHANGE UP
GLUCOSE SERPL-MCNC: 111 MG/DL — HIGH (ref 70–99)
MAGNESIUM SERPL-MCNC: 1.8 MG/DL — SIGNIFICANT CHANGE UP (ref 1.6–2.6)
POTASSIUM SERPL-MCNC: 4 MMOL/L — SIGNIFICANT CHANGE UP (ref 3.5–5.3)
POTASSIUM SERPL-SCNC: 4 MMOL/L — SIGNIFICANT CHANGE UP (ref 3.5–5.3)
SODIUM SERPL-SCNC: 136 MMOL/L — SIGNIFICANT CHANGE UP (ref 135–145)

## 2022-04-21 PROCEDURE — 99232 SBSQ HOSP IP/OBS MODERATE 35: CPT

## 2022-04-21 RX ORDER — SACUBITRIL AND VALSARTAN 24; 26 MG/1; MG/1
1 TABLET, FILM COATED ORAL
Qty: 60 | Refills: 0
Start: 2022-04-21 | End: 2022-05-20

## 2022-04-21 RX ORDER — MAGNESIUM SULFATE 500 MG/ML
1 VIAL (ML) INJECTION ONCE
Refills: 0 | Status: COMPLETED | OUTPATIENT
Start: 2022-04-21 | End: 2022-04-21

## 2022-04-21 RX ORDER — FUROSEMIDE 40 MG
40 TABLET ORAL EVERY 12 HOURS
Refills: 0 | Status: DISCONTINUED | OUTPATIENT
Start: 2022-04-21 | End: 2022-04-22

## 2022-04-21 RX ORDER — VALSARTAN 80 MG/1
80 TABLET ORAL DAILY
Refills: 0 | Status: DISCONTINUED | OUTPATIENT
Start: 2022-04-21 | End: 2022-04-22

## 2022-04-21 RX ORDER — ACETAMINOPHEN 500 MG
650 TABLET ORAL EVERY 6 HOURS
Refills: 0 | Status: DISCONTINUED | OUTPATIENT
Start: 2022-04-21 | End: 2022-04-22

## 2022-04-21 RX ADMIN — Medication 50 MICROGRAM(S): at 04:39

## 2022-04-21 RX ADMIN — Medication 650 MILLIGRAM(S): at 04:40

## 2022-04-21 RX ADMIN — SODIUM CHLORIDE 3 MILLILITER(S): 9 INJECTION INTRAMUSCULAR; INTRAVENOUS; SUBCUTANEOUS at 22:26

## 2022-04-21 RX ADMIN — CARVEDILOL PHOSPHATE 6.25 MILLIGRAM(S): 80 CAPSULE, EXTENDED RELEASE ORAL at 17:00

## 2022-04-21 RX ADMIN — Medication 25 MILLIGRAM(S): at 04:40

## 2022-04-21 RX ADMIN — Medication 100 GRAM(S): at 04:41

## 2022-04-21 RX ADMIN — VALSARTAN 80 MILLIGRAM(S): 80 TABLET ORAL at 17:01

## 2022-04-21 RX ADMIN — CARVEDILOL PHOSPHATE 6.25 MILLIGRAM(S): 80 CAPSULE, EXTENDED RELEASE ORAL at 04:39

## 2022-04-21 RX ADMIN — Medication 650 MILLIGRAM(S): at 05:11

## 2022-04-21 RX ADMIN — Medication 650 MILLIGRAM(S): at 21:30

## 2022-04-21 RX ADMIN — SODIUM CHLORIDE 3 MILLILITER(S): 9 INJECTION INTRAMUSCULAR; INTRAVENOUS; SUBCUTANEOUS at 13:37

## 2022-04-21 RX ADMIN — Medication 25 MILLIGRAM(S): at 17:00

## 2022-04-21 RX ADMIN — SODIUM CHLORIDE 3 MILLILITER(S): 9 INJECTION INTRAMUSCULAR; INTRAVENOUS; SUBCUTANEOUS at 04:29

## 2022-04-21 RX ADMIN — Medication 650 MILLIGRAM(S): at 20:36

## 2022-04-21 RX ADMIN — SPIRONOLACTONE 25 MILLIGRAM(S): 25 TABLET, FILM COATED ORAL at 04:39

## 2022-04-21 RX ADMIN — Medication 40 MILLIGRAM(S): at 04:40

## 2022-04-21 RX ADMIN — Medication 40 MILLIGRAM(S): at 17:00

## 2022-04-21 NOTE — PROGRESS NOTE ADULT - PROBLEM SELECTOR PLAN 1
-now euvolemic  -cw current meds  -outpatient f/u with cardiology to initiate entresto
- s/p diagnostic LHC via  - Site stable; wthout hematoma, positive pulses  - Continue DASH diet  - Awaiting cardiac MRI  - Started on BB, ACE & Lasix

## 2022-04-21 NOTE — PROGRESS NOTE ADULT - PROBLEM SELECTOR PLAN 2
-unclear etiology  -?sequelae of prior VATS vs malignancy vs sarcoid  -pulm consult  -pt may prefer to pursue full malignancy w/u as outpt -unclear etiology  -?sequelae of prior VATS vs malignancy vs sarcoid  -pulm consult emailed  -pt may prefer to pursue full malignancy w/u as outpt

## 2022-04-22 ENCOUNTER — TRANSCRIPTION ENCOUNTER (OUTPATIENT)
Age: 52
End: 2022-04-22

## 2022-04-22 VITALS
SYSTOLIC BLOOD PRESSURE: 122 MMHG | OXYGEN SATURATION: 98 % | DIASTOLIC BLOOD PRESSURE: 78 MMHG | HEART RATE: 88 BPM | RESPIRATION RATE: 18 BRPM | TEMPERATURE: 98 F

## 2022-04-22 LAB
ANION GAP SERPL CALC-SCNC: 14 MMOL/L — SIGNIFICANT CHANGE UP (ref 5–17)
ANION GAP SERPL CALC-SCNC: 15 MMOL/L — SIGNIFICANT CHANGE UP (ref 5–17)
BUN SERPL-MCNC: 22 MG/DL — SIGNIFICANT CHANGE UP (ref 7–23)
BUN SERPL-MCNC: 22 MG/DL — SIGNIFICANT CHANGE UP (ref 7–23)
CALCIUM SERPL-MCNC: 8.8 MG/DL — SIGNIFICANT CHANGE UP (ref 8.4–10.5)
CALCIUM SERPL-MCNC: 9 MG/DL — SIGNIFICANT CHANGE UP (ref 8.4–10.5)
CHLORIDE SERPL-SCNC: 99 MMOL/L — SIGNIFICANT CHANGE UP (ref 96–108)
CHLORIDE SERPL-SCNC: 99 MMOL/L — SIGNIFICANT CHANGE UP (ref 96–108)
CO2 SERPL-SCNC: 14 MMOL/L — LOW (ref 22–31)
CO2 SERPL-SCNC: 21 MMOL/L — LOW (ref 22–31)
CREAT SERPL-MCNC: 1.08 MG/DL — SIGNIFICANT CHANGE UP (ref 0.5–1.3)
CREAT SERPL-MCNC: 1.13 MG/DL — SIGNIFICANT CHANGE UP (ref 0.5–1.3)
EGFR: 78 ML/MIN/1.73M2 — SIGNIFICANT CHANGE UP
EGFR: 83 ML/MIN/1.73M2 — SIGNIFICANT CHANGE UP
GLUCOSE SERPL-MCNC: 117 MG/DL — HIGH (ref 70–99)
GLUCOSE SERPL-MCNC: 128 MG/DL — HIGH (ref 70–99)
MAGNESIUM SERPL-MCNC: 2 MG/DL — SIGNIFICANT CHANGE UP (ref 1.6–2.6)
POTASSIUM SERPL-MCNC: 5.2 MMOL/L — SIGNIFICANT CHANGE UP (ref 3.5–5.3)
POTASSIUM SERPL-MCNC: 5.5 MMOL/L — HIGH (ref 3.5–5.3)
POTASSIUM SERPL-SCNC: 5.2 MMOL/L — SIGNIFICANT CHANGE UP (ref 3.5–5.3)
POTASSIUM SERPL-SCNC: 5.5 MMOL/L — HIGH (ref 3.5–5.3)
SODIUM SERPL-SCNC: 128 MMOL/L — LOW (ref 135–145)
SODIUM SERPL-SCNC: 134 MMOL/L — LOW (ref 135–145)

## 2022-04-22 PROCEDURE — 75561 CARDIAC MRI FOR MORPH W/DYE: CPT

## 2022-04-22 PROCEDURE — 99152 MOD SED SAME PHYS/QHP 5/>YRS: CPT

## 2022-04-22 PROCEDURE — C1769: CPT

## 2022-04-22 PROCEDURE — 80048 BASIC METABOLIC PNL TOTAL CA: CPT

## 2022-04-22 PROCEDURE — 93460 R&L HRT ART/VENTRICLE ANGIO: CPT

## 2022-04-22 PROCEDURE — 93005 ELECTROCARDIOGRAM TRACING: CPT

## 2022-04-22 PROCEDURE — A9585: CPT

## 2022-04-22 PROCEDURE — 99239 HOSP IP/OBS DSCHRG MGMT >30: CPT

## 2022-04-22 PROCEDURE — 36415 COLL VENOUS BLD VENIPUNCTURE: CPT

## 2022-04-22 PROCEDURE — 99153 MOD SED SAME PHYS/QHP EA: CPT

## 2022-04-22 PROCEDURE — 99221 1ST HOSP IP/OBS SF/LOW 40: CPT

## 2022-04-22 PROCEDURE — C1894: CPT

## 2022-04-22 PROCEDURE — C1887: CPT

## 2022-04-22 PROCEDURE — 99232 SBSQ HOSP IP/OBS MODERATE 35: CPT

## 2022-04-22 PROCEDURE — 83735 ASSAY OF MAGNESIUM: CPT

## 2022-04-22 RX ORDER — FUROSEMIDE 40 MG
40 TABLET ORAL DAILY
Refills: 0 | Status: DISCONTINUED | OUTPATIENT
Start: 2022-04-22 | End: 2022-04-22

## 2022-04-22 RX ORDER — VALSARTAN 80 MG/1
1 TABLET ORAL
Qty: 30 | Refills: 0
Start: 2022-04-22 | End: 2022-05-21

## 2022-04-22 RX ORDER — SPIRONOLACTONE 25 MG/1
1 TABLET, FILM COATED ORAL
Qty: 30 | Refills: 0
Start: 2022-04-22 | End: 2022-05-21

## 2022-04-22 RX ORDER — HYDRALAZINE HCL 50 MG
1 TABLET ORAL
Qty: 60 | Refills: 0
Start: 2022-04-22 | End: 2022-05-21

## 2022-04-22 RX ORDER — FUROSEMIDE 40 MG
1 TABLET ORAL
Qty: 30 | Refills: 0
Start: 2022-04-22 | End: 2022-05-21

## 2022-04-22 RX ORDER — CARVEDILOL PHOSPHATE 80 MG/1
1 CAPSULE, EXTENDED RELEASE ORAL
Qty: 60 | Refills: 0
Start: 2022-04-22 | End: 2022-05-21

## 2022-04-22 RX ORDER — MORPHINE SULFATE 50 MG/1
2 CAPSULE, EXTENDED RELEASE ORAL ONCE
Refills: 0 | Status: DISCONTINUED | OUTPATIENT
Start: 2022-04-22 | End: 2022-04-22

## 2022-04-22 RX ADMIN — CARVEDILOL PHOSPHATE 6.25 MILLIGRAM(S): 80 CAPSULE, EXTENDED RELEASE ORAL at 05:16

## 2022-04-22 RX ADMIN — SPIRONOLACTONE 25 MILLIGRAM(S): 25 TABLET, FILM COATED ORAL at 05:16

## 2022-04-22 RX ADMIN — SODIUM CHLORIDE 3 MILLILITER(S): 9 INJECTION INTRAMUSCULAR; INTRAVENOUS; SUBCUTANEOUS at 14:28

## 2022-04-22 RX ADMIN — VALSARTAN 80 MILLIGRAM(S): 80 TABLET ORAL at 05:16

## 2022-04-22 RX ADMIN — Medication 650 MILLIGRAM(S): at 05:16

## 2022-04-22 RX ADMIN — Medication 25 MILLIGRAM(S): at 05:15

## 2022-04-22 RX ADMIN — Medication 50 MICROGRAM(S): at 05:16

## 2022-04-22 RX ADMIN — SODIUM CHLORIDE 3 MILLILITER(S): 9 INJECTION INTRAMUSCULAR; INTRAVENOUS; SUBCUTANEOUS at 05:11

## 2022-04-22 RX ADMIN — MORPHINE SULFATE 2 MILLIGRAM(S): 50 CAPSULE, EXTENDED RELEASE ORAL at 01:05

## 2022-04-22 RX ADMIN — Medication 650 MILLIGRAM(S): at 06:15

## 2022-04-22 RX ADMIN — Medication 40 MILLIGRAM(S): at 05:15

## 2022-04-22 RX ADMIN — Medication 500 MILLIGRAM(S): at 02:04

## 2022-04-22 RX ADMIN — MORPHINE SULFATE 2 MILLIGRAM(S): 50 CAPSULE, EXTENDED RELEASE ORAL at 01:30

## 2022-04-22 RX ADMIN — Medication 500 MILLIGRAM(S): at 02:32

## 2022-04-22 NOTE — DISCHARGE NOTE NURSING/CASE MANAGEMENT/SOCIAL WORK - PATIENT PORTAL LINK FT
You can access the FollowMyHealth Patient Portal offered by [facility] by registering at the following website: [email or web address]. By joining [employer]’s FollowMyHealth portal, you will also be able to view your health information using other applications (apps) compatible with our system.

## 2022-04-22 NOTE — DISCHARGE NOTE PROVIDER - NSDCMRMEDTOKEN_GEN_ALL_CORE_FT
furosemide 40 mg oral tablet: 1 tab(s) orally 2 times a day hosp  levothyroxine 50 mcg (0.05 mg) oral capsule: 1 cap(s) orally once a day home / hospital    lisinopril 20 mg oral tablet: 1 tab(s) orally once a day home/ hospital     carvedilol 6.25 mg oral tablet: 1 tab(s) orally every 12 hours MDD:2  furosemide 40 mg oral tablet: 1 tab(s) orally once a day MDD:1  hydrALAZINE 25 mg oral tablet: 1 tab(s) orally every 12 hours MDD:2  levothyroxine 50 mcg (0.05 mg) oral capsule: 1 cap(s) orally once a day home / hospital    spironolactone 25 mg oral tablet: 1 tab(s) orally once a day MDD:1  valsartan 80 mg oral tablet: 1 tab(s) orally once a day MDD:1

## 2022-04-22 NOTE — DISCHARGE NOTE PROVIDER - NSDCFUSCHEDAPPT_GEN_ALL_CORE_FT
SAUD ARIANACHEYENNE BARJAAS ; 04/28/2022 ; [facility] [address] [facility]

## 2022-04-22 NOTE — DISCHARGE NOTE PROVIDER - PROVIDER TOKENS
PROVIDER:[TOKEN:[[id number]],FOLLOWUP:[2 weeks],ESTABLISHEDPATIENT:[T]],PROVIDER:[TOKEN:[[id number]],FOLLOWUP:[2 weeks],ESTABLISHEDPATIENT:[T]]
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
## 2022-04-22 NOTE — DISCHARGE NOTE PROVIDER - CARE PROVIDER_API CALL
Douglas Aguilar (MD)  Cardiovascular Disease; Internal Medicine  [address], [place] [id number]  Phone: [phone]  Fax: [phone]  Established Patient  Follow Up Time: 2 weeks    Eagle StoverDO; MPH)  Internal Medicine; Pulmonary Disease  [address], [facility] [address]  [place] [address]  Phone: [phone]  Fax: [phone]  Established Patient  Follow Up Time: 2 weeks

## 2022-04-22 NOTE — DISCHARGE NOTE PROVIDER - NSDCCPTREATMENT_GEN_ALL_CORE_FT
PRINCIPAL PROCEDURE  Procedure: Left heart cardiac cath  Findings and Treatment: diagnostic, no intervention      SECONDARY PROCEDURE  Procedure: MRI cardiac function  Findings and Treatment: EF 23%

## 2022-04-22 NOTE — CHART NOTE - NSCHARTNOTEFT_GEN_A_CORE
51M w/ PMHx of hypothyroidism, PTX requiring RIGHT lung VATS found to have new NICM. Now optimized s/p IV diuresis. Incidentally found to have mediastinal lymphadenopathy and RLL loculated effusion/?mass. These findings may represent sequelae of his prior VATS however difficulty to exclude malignancy.     Patient seen and examined at bedside. States that he feels well, wants to go home today, he states he does not have pulmonologist outpt     #Acute on chronic systolic congestive heart failure.   -now euvolemic  -cw current meds  -outpatient f/u with cardiology to initiate entresto as currently requiring prior auth Dr. Aguilar aware  - d/c home on Valsartan      # Mediastinal lymphadenopathy.   -unclear etiology sequelae of prior VATS vs malignancy vs sarcoid  -pulm consulted d/w Dr. Stover rec outpt f/u for repeat CT scan and sleep study       Patient is medically stable for d/c home today. Patient to f/u with cardiology in 2 weeks. Time spent 36min  d/w Medicine BONITA Joshua

## 2022-04-22 NOTE — DISCHARGE NOTE NURSING/CASE MANAGEMENT/SOCIAL WORK - NSDCPEFALRISK_GEN_ALL_CORE
For information on Fall & Injury Prevention, visit: [email or web address] OR  [email or web address] OR  https://www.cdc.gov/steadi/patient.html
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
## 2022-04-22 NOTE — DISCHARGE NOTE PROVIDER - NSDCFUADDINST_GEN_ALL_CORE_FT
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
## 2022-04-22 NOTE — DISCHARGE NOTE PROVIDER - NSDCCPCAREPLAN_GEN_ALL_CORE_FT
PRINCIPAL DISCHARGE DIAGNOSIS  Diagnosis: CHF, chronic  Assessment and Plan of Treatment:        PRINCIPAL DISCHARGE DIAGNOSIS  Diagnosis: Acute systolic congestive heart failure  Assessment and Plan of Treatment: Take your medications as prescribed. Follow a low-salt, low salt, low cholesterol heart healthy diet. Weigh yourself every day and keep a record; call your doctor if you gain 2 pounds over one to two days or 5 pounds over three days. Get to or maintain a healthy weight; ask your heart failure team for referrals to a registered dietitian if needed. Avoid alcohol. Be active (check with your physician or cardiologist first). Find healthy ways to deal with stress, such as deep breathing, meditation, exercise, and doing hobbies that you enjoy. If you smoke, quit. (A resource to help you stop smoking is the [facility] [employer] [facility] [employer] – phone number [phone].).      SECONDARY DISCHARGE DIAGNOSES  Diagnosis: Hypertension  Assessment and Plan of Treatment: Continue with your blood pressure medications; eat a heart healthy diet with low salt diet; exercise regularly (consult with your physician or cardiologist first); maintain a heart healthy weight; if you smoke - quit (A resource to help you stop smoking is the [facility] [employer] – phone number [phone].); include healthy ways to manage stress. Continue to follow with your primary care physician or cardiologist.

## 2022-04-25 ENCOUNTER — NON-APPOINTMENT (OUTPATIENT)
Age: 52
End: 2022-04-25

## 2022-04-28 ENCOUNTER — APPOINTMENT (OUTPATIENT)
Dept: PULMONOLOGY | Facility: CLINIC | Age: 52
End: 2022-04-28
Payer: MEDICAID

## 2022-04-28 VITALS
HEIGHT: 71 IN | WEIGHT: 240 LBS | SYSTOLIC BLOOD PRESSURE: 130 MMHG | OXYGEN SATURATION: 98 % | HEART RATE: 100 BPM | DIASTOLIC BLOOD PRESSURE: 86 MMHG | TEMPERATURE: 96.5 F | RESPIRATION RATE: 16 BRPM | BODY MASS INDEX: 33.6 KG/M2

## 2022-04-28 DIAGNOSIS — Z78.9 OTHER SPECIFIED HEALTH STATUS: ICD-10-CM

## 2022-04-28 DIAGNOSIS — Z82.49 FAMILY HISTORY OF ISCHEMIC HEART DISEASE AND OTHER DISEASES OF THE CIRCULATORY SYSTEM: ICD-10-CM

## 2022-04-28 PROCEDURE — 94729 DIFFUSING CAPACITY: CPT

## 2022-04-28 PROCEDURE — ZZZZZ: CPT

## 2022-04-28 PROCEDURE — 99204 OFFICE O/P NEW MOD 45 MIN: CPT | Mod: 25

## 2022-04-28 PROCEDURE — 71046 X-RAY EXAM CHEST 2 VIEWS: CPT

## 2022-04-28 PROCEDURE — 94727 GAS DIL/WSHOT DETER LNG VOL: CPT

## 2022-04-28 PROCEDURE — 94618 PULMONARY STRESS TESTING: CPT

## 2022-04-28 PROCEDURE — 95012 NITRIC OXIDE EXP GAS DETER: CPT

## 2022-04-28 PROCEDURE — 94010 BREATHING CAPACITY TEST: CPT

## 2022-04-28 NOTE — HISTORY OF PRESENT ILLNESS
[TextBox_4] : Mr. VILLAVICENCIO is a 51 year old male with a history of decreased libido, depression, right sided empiema, gout, hypothyroid, JULIAN, former smoker, presenting to the office today for initial pulmonary evaluation. His chief complaint is\par -he notes having sob onset week ago \par -he notes going to [facility] \par -he notes feeling much better \par -he notes checking for swelling in his legs but nothing prominent \par -he notes stable weight \par -he notes snoring \par -he never got treated for tuberculosis \par -he notes living in [place] \par  \par -he denies any headaches, nausea, vomiting, fever, chills, sweats, chest pain, chest pressure, diarrhea, constipation, dysphagia, dizziness, leg swelling, leg pain, itchy eyes, itchy ears, heartburn, reflux, sour taste in the mouth, myalgias or arthralgias.

## 2022-04-28 NOTE — ASSESSMENT
[FreeTextEntry1] : Mr. VILLAVICENCIO is a 51 year old male with a history of decreased libido, depression, right sided empyema, gout, hypothyroid, JULIAN, former smoker, presenting to the office today for initial pulmonary evaluation. His chief complaint is sob/ c/w CHF, new right pleural effusion from 2020/ untreated JULIAN \par \par His shortness of breath is multifactorial due to:\par -poor mechanics of breathing \par -out of shape / overweight\par -pulmonary disease\par    -\par -?cardiac disease (consult Broko/ Arpit) \par \par problem 1: CHF\par -Consult Dr. Doe/ Arpit \par -Complete BNP\par \par problem 2: Right sided Pleural Effusion from 2020 \par -f/p chest x ray in 3 weeks; if persists, CT scan will be needed\par -Complete breathing tests \par \par problem 3: JULIAN (Mallampati class, neck size, fatigue, snoring) \par -Complete home sleep study \par Sleep apnea is associated with adverse clinical consequences which can affect most organ systems.  Cardiovascular disease risk includes arrhythmias, atrial fibrillation, hypertension, coronary artery disease, and stroke. Metabolic disorders include diabetes type 2, non-alcoholic fatty liver disease. Mood disorder especially depression; and cognitive decline especially in the elderly. Associations with  chronic reflux/Mcmillan’s esophagus some but not all inclusive. \par -Reasons  include arousal consistent with hypopnea; respiratory events most prominent in REM sleep or supine position; therefore sleep staging and body position are important for accurate diagnosis and estimation of AHI. \par \par problem : cardiac disease\par -recommended to continue to follow up with Cardiologist \par \par problem : poor breathing mechanics\par -recommended Wim Hof and Buteyko breathing techniques\par -recommended internet exercise platforms: [employer] and Aerobic exercise for seniors\par -Proper breathing techniques were reviewed with an emphasis of exhalation. Patient instructed to breath in for 1 second and out for four seconds. Patient was encouraged to not talk while walking. \par \par problem : out of shape / overweight\par -recommended "MUNIQ" supplements \par -Weight loss, exercise, and diet control were discussed and are highly encouraged. Treatment options were given such as, aqua therapy, and contacting a nutritionist. Recommended to use the elliptical, stationary bike, less use of treadmill. Mindful eating was explained to the patient Obesity is associated with worsening asthma, shortness of breath, and potential for cardiac disease, diabetes, and other underlying medical conditions\par \par problem : health maintenance \par -recommended yearly flu shot after October 15\par -recommended strep pneumonia vaccines: Prevnar-13 vaccine, followed by Pneumo vaccine 23 one year following after 65\par -recommended early intervention for Upper Respiratory Infections (URIs)\par -recommended regular osteoporosis evaluations\par -recommended early dermatological evaluations\par -recommended after the age of 50 to the age of 70, colonoscopy every 5 years\par \par F/U in 6-8 weeks.\par He is encouraged to call with any changes, concerns, or questions\par

## 2022-04-28 NOTE — REASON FOR VISIT
[Initial] : an initial visit [TextBox_44] : sob/ c/w CHF, new right pleural effusion 2020/ untreated JULIAN

## 2022-04-28 NOTE — ADDENDUM
[FreeTextEntry1] : Documented by Elio Saunders acting as a scribe for Dr. Brendan Murphy on 04/28/2022 \par \par All medical record entries made by the Scribe were at my, Dr. Brendan Murphy's, direction and personally dictated by me on 04/28/2022 . I have reviewed the chart and agree that the record accurately reflects my personal performance of the history, physical exam, assessment and plan. I have also personally directed, reviewed, and agree with the discharge instructions

## 2022-04-28 NOTE — PROCEDURE
[FreeTextEntry1] : CXR reveals a normal sized heart; right sided pleural disease \par \par CT angiogram 4/17/22 nodular CP angle and nodule occulted pleural oral effusion; scattered mediastinal and medial lymph nodes; no PE noted \par \par july 2020 fatty liver, lower chest was normal at the time, \par \par July 2021 PA lateral showed normal lungs; in consistent with prior readings \par \par MRI heart showed no perfusion abnormalities LV is dilated with systemic function of 23%. \par \par CT showed cardiomegaly and left lung infiltrated; perihilar infiltrate \par \par FENO was 7      ; a normal value being less than 25\par Fractional exhaled nitric oxide (FENO) is regarded as a simple, noninvasive method for assessing eosinophilic airway inflammation. Produced by a variety of cells within the lung, nitric oxide (NO) concentrations are generally low in healthy individuals. However, high concentrations of NO appear to be involved in nonspecific host defense mechanisms and chronic inflammatory diseases such as asthma. The American Thoracic Society (ATS) therefore has recommended using FENO to aid in the diagnosis and monitoring of eosinophilic airway inflammation and asthma, and for identifying steroid responsive individuals whose chronic respiratory symptoms may be caused by airway inflammation. \par \par Full PFT revealed mild restrictive dysfunction,  normal flows, with a FEV1 of 2.33L,which is 65% of predicted,  mild decreased lung volumes, and a diffusion of 22.5, which is 77% of predicted with a normal flow volume loop \par \par 6 minute walk test reveals a low saturation of 91% with moderate evidence of dyspnea or fatigue; walked 489  meters

## 2022-04-30 ENCOUNTER — TRANSCRIPTION ENCOUNTER (OUTPATIENT)
Age: 52
End: 2022-04-30

## 2022-05-01 ENCOUNTER — TRANSCRIPTION ENCOUNTER (OUTPATIENT)
Age: 52
End: 2022-05-01

## 2022-05-20 ENCOUNTER — NON-APPOINTMENT (OUTPATIENT)
Age: 52
End: 2022-05-20

## 2022-05-20 ENCOUNTER — RX RENEWAL (OUTPATIENT)
Age: 52
End: 2022-05-20

## 2022-05-20 ENCOUNTER — APPOINTMENT (OUTPATIENT)
Dept: CARDIOLOGY | Facility: CLINIC | Age: 52
End: 2022-05-20
Payer: MEDICAID

## 2022-05-20 VITALS
HEIGHT: 71 IN | BODY MASS INDEX: 33.6 KG/M2 | DIASTOLIC BLOOD PRESSURE: 83 MMHG | SYSTOLIC BLOOD PRESSURE: 133 MMHG | WEIGHT: 240 LBS | HEART RATE: 99 BPM | OXYGEN SATURATION: 99 %

## 2022-05-20 PROCEDURE — 99214 OFFICE O/P EST MOD 30 MIN: CPT | Mod: 25

## 2022-05-20 PROCEDURE — 93000 ELECTROCARDIOGRAM COMPLETE: CPT

## 2022-05-20 RX ORDER — LISINOPRIL 20 MG/1
20 TABLET ORAL
Qty: 90 | Refills: 3 | Status: DISCONTINUED | COMMUNITY
End: 2022-05-20

## 2022-05-20 RX ORDER — FUROSEMIDE 40 MG/1
40 TABLET ORAL
Qty: 180 | Refills: 3 | Status: DISCONTINUED | COMMUNITY
End: 2022-05-20

## 2022-05-20 RX ORDER — ENOXAPARIN SODIUM 300 MG/3ML
INJECTION INTRAVENOUS; SUBCUTANEOUS
Refills: 0 | Status: DISCONTINUED | COMMUNITY
End: 2022-05-20

## 2022-05-20 NOTE — DISCUSSION/SUMMARY
[FreeTextEntry1] : Tim recently presented with shortness of breath, and was found to have a severe nonischemic cardiomyopathy.  He is feeling better today, and appears close to euvolemic on exam.  His blood pressure is acceptable, and and his physical exam is unremarkable.  His EKG continues to demonstrate a sinus rhythm, with a nonspecific ST abnormality, and evidence of left ventricular hypertrophy.\par \par We discussed his medication regimen in detail.  He will go back on carvedilol 6.25 twice daily, hydralazine 25 twice daily, and valsartan 80.  I am going to try to switch him to Entresto, if there are no insurance issues.  He will also continue spironolactone, and Lasix 40.  He will need blood work in the next few weeks.  We will repeat his echocardiogram in about 3 months on optical medical therapy, to see him if his cardiac function has improved.  I will see him again in 4 weeks.
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
## 2022-05-24 RX ORDER — VALSARTAN 80 MG/1
80 TABLET, COATED ORAL
Qty: 90 | Refills: 3 | Status: DISCONTINUED | COMMUNITY
Start: 2022-05-20 | End: 2022-05-24

## 2022-05-27 ENCOUNTER — TRANSCRIPTION ENCOUNTER (OUTPATIENT)
Age: 52
End: 2022-05-27

## 2022-05-31 ENCOUNTER — APPOINTMENT (OUTPATIENT)
Dept: PULMONOLOGY | Facility: CLINIC | Age: 52
End: 2022-05-31
Payer: MEDICAID

## 2022-05-31 VITALS
RESPIRATION RATE: 16 BRPM | HEART RATE: 94 BPM | BODY MASS INDEX: 32.9 KG/M2 | TEMPERATURE: 97.5 F | DIASTOLIC BLOOD PRESSURE: 70 MMHG | HEIGHT: 71 IN | WEIGHT: 235 LBS | OXYGEN SATURATION: 98 % | SYSTOLIC BLOOD PRESSURE: 106 MMHG

## 2022-05-31 PROCEDURE — 99214 OFFICE O/P EST MOD 30 MIN: CPT | Mod: 25

## 2022-05-31 PROCEDURE — 94729 DIFFUSING CAPACITY: CPT

## 2022-05-31 PROCEDURE — 94010 BREATHING CAPACITY TEST: CPT

## 2022-05-31 PROCEDURE — 71046 X-RAY EXAM CHEST 2 VIEWS: CPT

## 2022-05-31 PROCEDURE — 94727 GAS DIL/WSHOT DETER LNG VOL: CPT

## 2022-05-31 NOTE — ASSESSMENT
[FreeTextEntry1] : Mr. VILLAVICENCIO is a 51 year old male with a history of decreased libido, depression, right sided empyema, gout, hypothyroid, JULIAN, former smoker, presenting to the office today for a follow up pulmonary evaluation. His chief complaint is sob/ c/w CHF, new right pleural effusion from 2020/ untreated JULIAN (on Dx) \par \par His shortness of breath is multifactorial due to:\par -poor mechanics of breathing \par -out of shape / overweight\par -pulmonary disease\par    -RADs\par -?cardiac disease (Anton)\par \par problem 1: CHF\par -Consult \par -Complete BNP  q 3 months \par \par problem 2: Right sided Pleural Effusion from 2020- resolved \par -repeat Xray- resolved \par \par Problem 3 abnormal PFT c/w prior restrictive dysfunction\par -improved on PFTs \par \par problem 4: JULIAN (Mallampati class, neck size, fatigue, snoring) \par -Complete home sleep study- pending \par Sleep apnea is associated with adverse clinical consequences which can affect most organ systems.  Cardiovascular disease risk includes arrhythmias, atrial fibrillation, hypertension, coronary artery disease, and stroke. Metabolic disorders include diabetes type 2, non-alcoholic fatty liver disease. Mood disorder especially depression; and cognitive decline especially in the elderly. Associations with  chronic reflux/Mcmillan’s esophagus some but not all inclusive. \par -Reasons  include arousal consistent with hypopnea; respiratory events most prominent in REM sleep or supine position; therefore sleep staging and body position are important for accurate diagnosis and estimation of AHI. \par \par problem 5: cardiac disease\par -recommended to continue to follow up with Cardiologist (Anton)\par \par problem 6: poor breathing mechanics\par -recommended Wim Hof and Buteyko breathing techniques\par -recommended internet exercise platforms: [employer] and Aerobic exercise for seniors\par -Proper breathing techniques were reviewed with an emphasis of exhalation. Patient instructed to breath in for 1 second and out for four seconds. Patient was encouraged to not talk while walking. \par \par problem 7: out of shape / overweight\par -recommended "MUNIQ" supplements \par -Weight loss, exercise, and diet control were discussed and are highly encouraged. Treatment options were given such as, aqua therapy, and contacting a nutritionist. Recommended to use the elliptical, stationary bike, less use of treadmill. Mindful eating was explained to the patient Obesity is associated with worsening asthma, shortness of breath, and potential for cardiac disease, diabetes, and other underlying medical conditions\par \par problem 8: health maintenance \par -s/p Covid 19 vaccine x3 \par -recommended yearly flu shot after October 15\par -recommended strep pneumonia vaccines: Prevnar-13 vaccine, followed by Pneumo vaccine 23 one year following after 65\par -recommended early intervention for Upper Respiratory Infections (URIs)\par -recommended regular osteoporosis evaluations\par -recommended early dermatological evaluations\par -recommended after the age of 50 to the age of 70, colonoscopy every 5 years\par \par F/U in 6-8 weeks.\par He is encouraged to call with any changes, concerns, or questions\par

## 2022-05-31 NOTE — PHYSICAL EXAM
[No Acute Distress] : no acute distress [Normal Oropharynx] : normal oropharynx [IV] : Mallampati Class: IV [Normal Appearance] : normal appearance [No Neck Mass] : no neck mass [Normal Rate/Rhythm] : normal rate/rhythm [Normal S1, S2] : normal s1, s2 [No Murmurs] : no murmurs [No Resp Distress] : no resp distress [Clear to Auscultation Bilaterally] : clear to auscultation bilaterally [No Abnormalities] : no abnormalities [Benign] : benign [Normal Gait] : normal gait [No Clubbing] : no clubbing [No Cyanosis] : no cyanosis [No Edema] : no edema [FROM] : FROM [Normal Color/ Pigmentation] : normal color/ pigmentation [No Focal Deficits] : no focal deficits [Oriented x3] : oriented x3 [Normal Affect] : normal affect [TextBox_68] : I:E ratio 1:3; clear

## 2022-05-31 NOTE — PROCEDURE
[FreeTextEntry1] : \par \par Full PFT revealed moderate restrictive dysfunction, with a FEV1 of 2.17L,which is 55% of predicted,  moderately reduced lung volumes, and mild to moderately reduced diffusion of 18, which is 61% of predicted with a normal flow volume loop. JOSIAS revealed PI max WNL and moderately reduced PE max

## 2022-05-31 NOTE — HISTORY OF PRESENT ILLNESS
[TextBox_4] : Mr. VILLAVICENCIO is a 51 year old male with a history of decreased libido, depression, right sided empiema, gout, hypothyroid, JULIAN, former smoker, presenting to the office today for initial pulmonary evaluation. His chief complaint is\par \par -he notes generally feeling good\par -He notes that bowels are regular \par -he notes sleep has improved \par -he notes levothyroxine refill is pending \par -he notes lack of energy and increased due to lack of levothyroxine \par -he denies coughing \par -he denies wheezing\par -he notes entresto is pending \par -he denies SOB in supine position and when sleeping \par -he notes prior back strain \par \par -denies any visual issues, headaches, nausea, vomiting, fever, chills, sweats, chest pain, chest pressure, diarrhea, constipation, dysphagia, dizziness, leg swelling, leg pain, itchy eyes, itchy ears, heartburn, reflux, or sour taste in the mouth.

## 2022-05-31 NOTE — ADDENDUM
[FreeTextEntry1] : Documented by Elio Saunders acting as a scribe for Dr. Brendan Murphy on 05/31/2022 \par \par All medical record entries made by the Scribe were at my, Dr. Brendan Murphy's, direction and personally dictated by me on 05/31/2022 . I have reviewed the chart and agree that the record accurately reflects my personal performance of the history, physical exam, assessment and plan. I have also personally directed, reviewed, and agree with the discharge instructions

## 2022-06-06 ENCOUNTER — APPOINTMENT (OUTPATIENT)
Dept: PULMONOLOGY | Facility: CLINIC | Age: 52
End: 2022-06-06
Payer: MEDICAID

## 2022-06-06 VITALS
DIASTOLIC BLOOD PRESSURE: 72 MMHG | SYSTOLIC BLOOD PRESSURE: 106 MMHG | TEMPERATURE: 97.8 F | RESPIRATION RATE: 16 BRPM | OXYGEN SATURATION: 98 % | HEART RATE: 85 BPM | BODY MASS INDEX: 32.9 KG/M2 | WEIGHT: 235 LBS | HEIGHT: 71 IN

## 2022-06-06 PROCEDURE — 71046 X-RAY EXAM CHEST 2 VIEWS: CPT

## 2022-06-06 PROCEDURE — 99214 OFFICE O/P EST MOD 30 MIN: CPT | Mod: 25

## 2022-06-06 NOTE — REASON FOR VISIT
[Follow-Up] : a follow-up visit [TextBox_44] : sob/ c/w CHF, new right pleural effusion 2020/ untreated JULIAN

## 2022-06-06 NOTE — HISTORY OF PRESENT ILLNESS
[TextBox_4] : Mr. VILLAVICENCIO is a 51 year old male with a history of decreased libido, depression, right sided empiema, gout, hypothyroid, JULIAN, former smoker, presenting to the office today for a f/u pulmonary evaluation. His chief complaint is\par -he notes exercising\par -he notes gaining 1.5 lbs\par -he notes that his breathing is good\par -he notes his bowels are regular \par -he denies any visual issues \par -he notes that he wants to lose weight\par \par -patient denies any headaches, nausea, vomiting, fever, chills, sweats, chest pain, chest pressure, palpitations, coughing, wheezing, fatigue, diarrhea, constipation, dysphagia, myalgias, dizziness, leg swelling, leg pain, itchy eyes, itchy ears, heartburn, reflux or sour taste in the mouth

## 2022-06-06 NOTE — ASSESSMENT
[FreeTextEntry1] : Mr. VILLAVICENCIO is a 51 year old male with a history of decreased libido, depression, right sided empyema, gout, hypothyroid, JULIAN, former smoker, presenting to the office today for a follow up pulmonary evaluation. His chief complaint is sob/ c/w CHF, new right pleural effusion from 2020/ untreated JULIAN (on Dx) -improved (exercising) \par \par His shortness of breath is multifactorial due to:\par -poor mechanics of breathing \par -out of shape / overweight\par -pulmonary disease\par    -RADs\par -?cardiac disease (Savella)\par \par problem 1: CHF\par -Consult \par -Complete BNP  q 3 months \par \par problem 2: Right sided Pleural Effusion from 2020- resolved \par -repeat Xray- resolved consistent with pleural thickening\par \par Problem 3 abnormal PFT c/w prior restrictive dysfunction\par -improved on PFTs \par \par Problem 3A: ?Diaphragm Dysfunction\par -complete fluoroscopy of the diaphragm\par \par problem 4: JULIAN (Mallampati class, neck size, fatigue, snoring) \par -Complete home sleep study- pending \par Sleep apnea is associated with adverse clinical consequences which can affect most organ systems.  Cardiovascular disease risk includes arrhythmias, atrial fibrillation, hypertension, coronary artery disease, and stroke. Metabolic disorders include diabetes type 2, non-alcoholic fatty liver disease. Mood disorder especially depression; and cognitive decline especially in the elderly. Associations with  chronic reflux/Mcmillan’s esophagus some but not all inclusive. \par -Reasons  include arousal consistent with hypopnea; respiratory events most prominent in REM sleep or supine position; therefore sleep staging and body position are important for accurate diagnosis and estimation of AHI. \par \par problem 5: cardiac disease\par -recommended to continue to follow up with Cardiologist (Lauren)\par \par problem 6: poor breathing mechanics\par -recommended Wim Hof and Buteyko breathing techniques\par -recommended internet exercise platforms: [employer] and Aerobic exercise for seniors\par -Proper breathing techniques were reviewed with an emphasis of exhalation. Patient instructed to breath in for 1 second and out for four seconds. Patient was encouraged to not talk while walking. \par \par problem 7: out of shape / overweight\par -Recommended Juvencio Mclaughlin's 10-day detox diet and book. \par -recommended "MUNIQ" supplements \par -Weight loss, exercise, and diet control were discussed and are highly encouraged. Treatment options were given such as, aqua therapy, and contacting a nutritionist. Recommended to use the elliptical, stationary bike, less use of treadmill. Mindful eating was explained to the patient Obesity is associated with worsening asthma, shortness of breath, and potential for cardiac disease, diabetes, and other underlying medical conditions\par \par problem 8: health maintenance \par -s/p Covid 19 vaccine x3 \par -recommended yearly flu shot after October 15\par -recommended strep pneumonia vaccines: Prevnar-13 vaccine, followed by Pneumo vaccine 23 one year following after 65\par -recommended early intervention for Upper Respiratory Infections (URIs)\par -recommended regular osteoporosis evaluations\par -recommended early dermatological evaluations\par -recommended after the age of 50 to the age of 70, colonoscopy every 5 years\par \par F/U in 6-8 weeks.\par He is encouraged to call with any changes, concerns, or questions\par

## 2022-06-06 NOTE — ADDENDUM
[FreeTextEntry1] : Documented by Nagi Hill acting as a scribe for Dr. Brendan Murphy on 06/06/2022.\par \par All medical record entries made by the Scribe were at my, Dr. Brendan Murphy's, direction and personally dictated by me on 06/06/2022. I have reviewed the chart and agree that the record accurately reflects my personal performance of the history, physical exam, assessment and plan. I have also personally directed, reviewed, and agree with the discharge instructions.

## 2022-07-01 ENCOUNTER — NON-APPOINTMENT (OUTPATIENT)
Age: 52
End: 2022-07-01

## 2022-07-01 ENCOUNTER — APPOINTMENT (OUTPATIENT)
Dept: CARDIOLOGY | Facility: CLINIC | Age: 52
End: 2022-07-01

## 2022-07-01 VITALS
SYSTOLIC BLOOD PRESSURE: 130 MMHG | HEIGHT: 71 IN | WEIGHT: 241 LBS | OXYGEN SATURATION: 98 % | HEART RATE: 94 BPM | BODY MASS INDEX: 33.74 KG/M2 | DIASTOLIC BLOOD PRESSURE: 90 MMHG

## 2022-07-01 LAB
ALBUMIN SERPL ELPH-MCNC: 4.5 G/DL
ALP BLD-CCNC: 66 U/L
ALT SERPL-CCNC: 21 U/L
ANION GAP SERPL CALC-SCNC: 12 MMOL/L
AST SERPL-CCNC: 21 U/L
BASOPHILS # BLD AUTO: 0.06 K/UL
BASOPHILS NFR BLD AUTO: 0.9 %
BILIRUB SERPL-MCNC: 0.4 MG/DL
BUN SERPL-MCNC: 16 MG/DL
CALCIUM SERPL-MCNC: 9.6 MG/DL
CHLORIDE SERPL-SCNC: 103 MMOL/L
CHOLEST SERPL-MCNC: 142 MG/DL
CO2 SERPL-SCNC: 27 MMOL/L
CREAT SERPL-MCNC: 1.18 MG/DL
EGFR: 74 ML/MIN/1.73M2
EOSINOPHIL # BLD AUTO: 0.19 K/UL
EOSINOPHIL NFR BLD AUTO: 2.9 %
GLUCOSE SERPL-MCNC: 99 MG/DL
HCT VFR BLD CALC: 47.5 %
HDLC SERPL-MCNC: 32 MG/DL
HGB BLD-MCNC: 14.6 G/DL
IMM GRANULOCYTES NFR BLD AUTO: 0.3 %
LDLC SERPL CALC-MCNC: 96 MG/DL
LYMPHOCYTES # BLD AUTO: 2.81 K/UL
LYMPHOCYTES NFR BLD AUTO: 42.9 %
MAN DIFF?: NORMAL
MCHC RBC-ENTMCNC: 27.2 PG
MCHC RBC-ENTMCNC: 30.7 GM/DL
MCV RBC AUTO: 88.6 FL
MONOCYTES # BLD AUTO: 0.57 K/UL
MONOCYTES NFR BLD AUTO: 8.7 %
NEUTROPHILS # BLD AUTO: 2.9 K/UL
NEUTROPHILS NFR BLD AUTO: 44.3 %
NONHDLC SERPL-MCNC: 110 MG/DL
NT-PROBNP SERPL-MCNC: 779 PG/ML
PLATELET # BLD AUTO: 287 K/UL
POTASSIUM SERPL-SCNC: 5.3 MMOL/L
PROT SERPL-MCNC: 7.6 G/DL
RBC # BLD: 5.36 M/UL
RBC # FLD: 13.5 %
SODIUM SERPL-SCNC: 142 MMOL/L
TRIGL SERPL-MCNC: 69 MG/DL
URATE SERPL-MCNC: 10.1 MG/DL
WBC # FLD AUTO: 6.55 K/UL

## 2022-07-01 PROCEDURE — 99214 OFFICE O/P EST MOD 30 MIN: CPT | Mod: 25

## 2022-07-01 PROCEDURE — 93000 ELECTROCARDIOGRAM COMPLETE: CPT
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
## 2022-07-01 NOTE — DISCUSSION/SUMMARY
[FreeTextEntry1] : Tim recently presented with shortness of breath, and was found to have a severe nonischemic cardiomyopathy.  He is feeling better today, and appears close to euvolemic on exam.  His blood pressure is acceptable, and and his physical exam is unremarkable.  His EKG continues to demonstrate a sinus rhythm, with a nonspecific ST abnormality, and evidence of left ventricular hypertrophy.\par \par We discussed his medication regimen in detail.  He will make sure that he is taking carvedilol 6.25 twice daily, hydralazine 25 twice daily, and  entresto. We will repeat his BW today and if renal function/potassium ok, I will increase his entresto.\par \par He will let me know what diuretic regimen he is taking. He stopped his spironolactone because of gout.\par \par  We will repeat his echocardiogram on [facility], to see him if his cardiac function has improved.  I will see him again in 4-6 weeks. [EKG obtained to assist in diagnosis and management of assessed problem(s)] : EKG obtained to assist in diagnosis and management of assessed problem(s)

## 2022-07-05 LAB
ESTIMATED AVERAGE GLUCOSE: 154 MG/DL
HBA1C MFR BLD HPLC: 7 %

## 2022-07-12 ENCOUNTER — APPOINTMENT (OUTPATIENT)
Dept: CARDIOLOGY | Facility: CLINIC | Age: 52
End: 2022-07-12

## 2022-07-12 PROCEDURE — 93306 TTE W/DOPPLER COMPLETE: CPT

## 2022-08-16 ENCOUNTER — APPOINTMENT (OUTPATIENT)
Dept: CARDIOLOGY | Facility: CLINIC | Age: 52
End: 2022-08-16

## 2022-08-16 ENCOUNTER — NON-APPOINTMENT (OUTPATIENT)
Age: 52
End: 2022-08-16

## 2022-08-16 VITALS
SYSTOLIC BLOOD PRESSURE: 122 MMHG | BODY MASS INDEX: 34.58 KG/M2 | WEIGHT: 247 LBS | DIASTOLIC BLOOD PRESSURE: 86 MMHG | HEART RATE: 77 BPM | OXYGEN SATURATION: 97 % | HEIGHT: 71 IN

## 2022-08-16 PROCEDURE — 93000 ELECTROCARDIOGRAM COMPLETE: CPT

## 2022-08-16 PROCEDURE — 99214 OFFICE O/P EST MOD 30 MIN: CPT | Mod: 25

## 2022-08-16 RX ORDER — SPIRONOLACTONE 25 MG/1
25 TABLET ORAL DAILY
Qty: 30 | Refills: 5 | Status: DISCONTINUED | COMMUNITY
Start: 2022-05-20 | End: 2022-08-16

## 2022-08-22 NOTE — CARDIOLOGY SUMMARY
[de-identified] : 5/20/22: sr, nsst abn, lvh\par \par 8/16/22:\par SR, LVH [de-identified] : 7/2022\par EF 29%\par severe global LV dysfunction- Mod LV enlargement\par mild diastolic stage 1 [de-identified] : 4/2022\par NON obstructive \par

## 2022-08-22 NOTE — DISCUSSION/SUMMARY
[FreeTextEntry1] : Tim recently presented with shortness of breath, and was found to have a severe nonischemic cardiomyopathy.  He is feeling better today, and appears close to euvolemic on exam.  His blood pressure is acceptable, and and his physical exam is unremarkable.  His EKG continues to demonstrate a sinus rhythm, with a nonspecific ST abnormality, and evidence of left ventricular hypertrophy.\par \par His repeat ECHO demonstrated severe global LV dysfunction. Mod LV enlargement. Mild diastolic dysfunction\par Angiogram from 4/2022 demonstrates non obstructive CAD.\par \par We discussed his medication regimen in detail.  He will increase carvedilol to 12.5mg twice daily, hydralazine 25 twice daily, and  entresto 49/51 mg BID. Most recent blood work revealed K+ of 5.3 and creat of 1.18.\par He will continue lasix 40mg daily and will remain off spironolactone because of gout.\par Adding farxiga 5mg daily this visit to optimize medical therapy.\par \par He will continue life style modifications such as diet and exercise for optimal cardiovascular benefits. \par He will follow up 6 weeks, sooner if questions or concerns arise . [EKG obtained to assist in diagnosis and management of assessed problem(s)] : EKG obtained to assist in diagnosis and management of assessed problem(s)
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
## 2022-09-19 ENCOUNTER — RX RENEWAL (OUTPATIENT)
Age: 52
End: 2022-09-19

## 2022-09-19 ENCOUNTER — RESULT CHARGE (OUTPATIENT)
Age: 52
End: 2022-09-19

## 2022-09-20 ENCOUNTER — NON-APPOINTMENT (OUTPATIENT)
Age: 52
End: 2022-09-20

## 2022-09-20 ENCOUNTER — APPOINTMENT (OUTPATIENT)
Dept: CARDIOLOGY | Facility: CLINIC | Age: 52
End: 2022-09-20

## 2022-09-20 VITALS
WEIGHT: 246 LBS | HEART RATE: 70 BPM | DIASTOLIC BLOOD PRESSURE: 88 MMHG | BODY MASS INDEX: 34.44 KG/M2 | SYSTOLIC BLOOD PRESSURE: 150 MMHG | OXYGEN SATURATION: 98 % | HEIGHT: 71 IN

## 2022-09-20 VITALS — SYSTOLIC BLOOD PRESSURE: 140 MMHG | DIASTOLIC BLOOD PRESSURE: 90 MMHG

## 2022-09-20 DIAGNOSIS — I50.21 ACUTE SYSTOLIC (CONGESTIVE) HEART FAILURE: ICD-10-CM

## 2022-09-20 PROCEDURE — 99214 OFFICE O/P EST MOD 30 MIN: CPT

## 2022-09-20 PROCEDURE — 93000 ELECTROCARDIOGRAM COMPLETE: CPT

## 2022-09-20 RX ORDER — DAPAGLIFLOZIN 5 MG/1
5 TABLET, FILM COATED ORAL DAILY
Qty: 30 | Refills: 3 | Status: DISCONTINUED | COMMUNITY
Start: 2022-08-16 | End: 2022-09-20
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
## 2022-09-25 NOTE — ADDENDUM
[FreeTextEntry1] : farxiga was not covered by his insurance.\par I will send a script for jardiance 10mg daily - for heart failure medical optimization.

## 2022-09-25 NOTE — CARDIOLOGY SUMMARY
[de-identified] : 5/20/22: sr, nsst abn, lvh\par 8/16/22:\par SR, LVH\par \par 9/20/22\par SR/LVH [de-identified] : 7/2022\par EF 29%\par severe global LV dysfunction- Mod LV enlargement\par mild diastolic stage 1 [de-identified] : 4/2022\par NON obstructive \par

## 2022-10-06 ENCOUNTER — APPOINTMENT (OUTPATIENT)
Dept: PULMONOLOGY | Facility: CLINIC | Age: 52
End: 2022-10-06

## 2022-10-06 VITALS
HEIGHT: 71 IN | SYSTOLIC BLOOD PRESSURE: 120 MMHG | RESPIRATION RATE: 16 BRPM | WEIGHT: 232 LBS | BODY MASS INDEX: 32.48 KG/M2 | TEMPERATURE: 97.4 F | DIASTOLIC BLOOD PRESSURE: 70 MMHG | HEART RATE: 79 BPM | OXYGEN SATURATION: 98 %

## 2022-10-06 PROCEDURE — 94727 GAS DIL/WSHOT DETER LNG VOL: CPT

## 2022-10-06 PROCEDURE — 95012 NITRIC OXIDE EXP GAS DETER: CPT

## 2022-10-06 PROCEDURE — 94729 DIFFUSING CAPACITY: CPT

## 2022-10-06 PROCEDURE — 99214 OFFICE O/P EST MOD 30 MIN: CPT | Mod: 25

## 2022-10-06 PROCEDURE — 94010 BREATHING CAPACITY TEST: CPT

## 2022-10-06 NOTE — ADDENDUM
[FreeTextEntry1] : Documented by Nagi Hill acting as a scribe for Dr. Brendan Murphy on 10/06/2022.\par \par All medical record entries made by the Scribe were at my, Dr. Brendan Murphy's, direction and personally dictated by me on 10/06/2022. I have reviewed the chart and agree that the record accurately reflects my personal performance of the history, physical exam, assessment and plan. I have also personally directed, reviewed, and agree with the discharge instructions.

## 2022-10-06 NOTE — PROCEDURE
[FreeTextEntry1] : Full PFT revealed mild restrictive and mild obstructive dysfunction, with a FEV1 of 2.94L, which is 57% of predicted, moderately decreased lung volumes corrected to normal volumes, and a mildly decreased diffusion of 18.9, which is 63% of predicted, with a normal flow volume loop \par \par Feno was 68; a normal value being less than 25. Fractional exhaled nitric oxide (FENO) is regarded as a simple, noninvasive method for assessing eosinophilic airway inflammation. Produced by a variety of cells within the lung, nitric oxide (NO) concentrations are generally low in healthy individuals. However, high concentrations of NO appear to be involved in nonspecific host defense mechanisms and chronic inflammatory  diseases such as asthma. The American Thoracic Society (ATS) therefore recommended using FENO to aid in the diagnosis and monitoring of eosinophilic airway inflammation and asthma, and for identifying steroid responsive individuals whose chronic respiratory symptoms may be caused by airway inflammation \par \par Full PFT revealed JOSIAS: PI max WNL (155, 136%), PE max - mild reduced  (140, 66%)

## 2022-10-06 NOTE — HISTORY OF PRESENT ILLNESS
[TextBox_4] : Mr. VILLAVICENCIO is a 52 year old male with a history of decreased libido, depression, right sided empiema, gout, hypothyroid, JULIAN, former smoker, presenting to the office today for a f/u pulmonary evaluation. His chief complaint is\par \par -he notes that his energy levels are  good\par -he notes diarrhea at times\par -he notes his bowels are regular \par -he notes exercising  (walking)\par -he notes that he has lost weight  (3 lbs)\par -he notes snoring  (worse)\par -he notes he missed his sleep study but has rescheduled\par \par -patient denies any headaches, nausea, vomiting, fever, chills, sweats, chest pain, chest pressure, palpitations, coughing, wheezing, fatigue, constipation, dysphagia, myalgias, dizziness, leg swelling, leg pain, itchy eyes, itchy ears, heartburn, reflux or sour taste in the mouth

## 2022-10-06 NOTE — REASON FOR VISIT
This is a \"Welcome to [employer]"  Initial Preventive Physical Examination (IPPE) providing Personalized Prevention Plan Services (Performed in the first 12 months of enrollment)    I have reviewed the patient's medical history in detail and updated the computerized patient record. History     Past Medical History:   Diagnosis Date    Abdominal pain 8/23/2017    Annual physical exam 8/23/2017    Arthritis     Chronic pain     Contact dermatitis due to cosmetics 8/23/2017    Cough 8/23/2017    Degenerative joint disease 8/23/2017    Dietary counseling 8/23/2017    DJD (degenerative joint disease) of knee 8/23/2017    Gallstones 8/23/2017    Gastroesophageal reflux disease with hiatal hernia 8/23/2017    GERD (gastroesophageal reflux disease)     Hip pain, right 8/23/2017    History of transfusion 8/23/2017    HTN (hypertension) 8/23/2017    Hypercholesteremia     Hyperlipidemia 8/23/2017    Hypertension     Hypokalemia 8/23/2017    Left thyroid nodule 8/23/2017    Leg swelling 8/23/2017    Lower abdominal pain 8/23/2017    Lumbar disc disease 8/23/2017    Menopause 8/23/2017    Morbid obesity ([address] 8/23/2017    Obesity (BMI 30-39. 9) 8/23/2017    Overactive bladder     Rash 8/23/2017    Rhinitis, allergic 8/23/2017    Screening for diabetes mellitus (DM) 8/23/2017    Screening for hypothyroidism 8/23/2017    Swelling of both lower extremities 8/23/2017    Tinea corporis 8/23/2017    Urinary incontinence 8/23/2017    Vitamin D deficiency 8/23/2017      Past Surgical History:   Procedure Laterality Date    ABDOMEN SURGERY PROC UNLISTED      HERNIA    COLONOSCOPY N/A 2/9/2018    [facility]  11/12/2014         HX CATARACT REMOVAL Right     HX HEENT      NODULES-VOCAL CORD    HX ORTHOPAEDIC  2004    ELIGIO KNEE REPLACEMENT    HX TONSILLECTOMY  AS CHILD    HX TUBAL LIGATION       Current Outpatient Medications   Medication Sig Dispense Refill    simvastatin (ZOCOR) 40 mg tablet TAKE 1 TABLET DAILY 90 Tab 0    lisinopril-hydroCHLOROthiazide (PRINZIDE, ZESTORETIC) 10-12.5 mg per tablet TAKE 1 TABLET BY MOUTH DAILY 30 Tab 0    RABEprazole (ACIPHEX) 20 mg tablet TAKE 1 TABLET BY MOUTH DAILY 90 Tab 3    potassium chloride SA (MICRO-K) 10 mEq capsule TAKE 2 CAPSULES BY MOUTH DAILY 60 Cap 0    diclofenac (VOLTAREN) 1 % gel Apply  to affected area four (4) times daily.  biotin (VITAMIN B7) 5 mg capsule Take 10 mg by mouth.  clotrimazole-betamethasone (LOTRISONE) topical cream Apply  to affected area two (2) times a day.  mirabegron ER (MYRBETRIQ) 25 mg ER tablet Take 25 mg by mouth daily.  ERGOCALCIFEROL, VITAMIN D2, (VITAMIN D2 PO) Take  by mouth.  omega-3 fatty acids-vitamin e (FISH OIL) 1,000 mg cap Take 1 Cap by mouth daily. Allergies   Allergen Reactions    Adhesive Tape-Silicones Unknown (comments)       Family History   Problem Relation Age of Onset    Diabetes Mother     Cancer Father     Hypertension Sister     Hypertension Brother     Heart Disease Brother      Social History     Tobacco Use    Smoking status: Never Smoker    Smokeless tobacco: Never Used   Substance Use Topics    Alcohol use: No       Depression Risk Screen     3 most recent PHQ Screens 11/13/2019   Little interest or pleasure in doing things Not at all   Feeling down, depressed, irritable, or hopeless Not at all   Total Score PHQ 2 0       Alcohol Risk Factor Screening:   Do you average 1 drink per night or more than 7 drinks a week:  No    On any one occasion in the past three months have you have had more than 3 drinks containing alcohol:  No      Functional Ability and Level of Safety   Diet: No special diet    Hearing: Hearing is good. Vision Screening:  Vision is good. No exam data present     Activities of Daily Living:   The home contains: grab bars  Patient does total self care    Ambulation: with no difficulty    Exercise level: moderately active    Fall Risk Screen:  Fall Risk Assessment, last 12 mths 11/13/2019   Able to walk? Yes   Fall in past 12 months? No       Abuse Screen:  Patient is not abused    Screening EKG   EKG order placed: No    Patient Care Team   Patient Care Team:  Lorane Ganser, NP as PCP - General (Nurse Practitioner)  Pam Salgado MD as Surgeon (General Surgery)     End of Life Planning   Advanced care planning directives were discussed with the patient and /or family/caregiver. Assessment/Plan   Education and counseling provided:  Are appropriate based on today's review and evaluation        Health Maintenance Due   Topic Date Due    DTaP/Tdap/Td series (1 - Tdap) 03/14/1975    Shingrix Vaccine Age 50> (1 of 2) 03/14/2004    MEDICARE YEARLY EXAM  10/22/2018    GLAUCOMA SCREENING Q2Y  03/14/2019    Bone Densitometry (Dexa) Screening  03/14/2019    Pneumococcal 65+ years (1 of 1 - PPSV23) 03/14/2019    Influenza Age 5 to Adult  08/01/2019       This is a \"Welcome to [employer]"  Initial Preventive Physical Examination (IPPE) providing Personalized Prevention Plan Services (Performed in the first 12 months of enrollment)    I have reviewed the patient's medical history in detail and updated the computerized patient record.      History     Past Medical History:   Diagnosis Date    Abdominal pain 8/23/2017    Annual physical exam 8/23/2017    Arthritis     Chronic pain     Contact dermatitis due to cosmetics 8/23/2017    Cough 8/23/2017    Degenerative joint disease 8/23/2017    Dietary counseling 8/23/2017    DJD (degenerative joint disease) of knee 8/23/2017    Gallstones 8/23/2017    Gastroesophageal reflux disease with hiatal hernia 8/23/2017    GERD (gastroesophageal reflux disease)     Hip pain, right 8/23/2017    History of transfusion 8/23/2017    HTN (hypertension) 8/23/2017    Hypercholesteremia     Hyperlipidemia 8/23/2017    Hypertension     Hypokalemia 8/23/2017    Left thyroid nodule 8/23/2017    Leg swelling 8/23/2017    Lower abdominal pain 8/23/2017    Lumbar disc disease 8/23/2017    Menopause 8/23/2017    Morbid obesity ([address] 8/23/2017    Obesity (BMI 30-39. 9) 8/23/2017    Overactive bladder     Rash 8/23/2017    Rhinitis, allergic 8/23/2017    Screening for diabetes mellitus (DM) 8/23/2017    Screening for hypothyroidism 8/23/2017    Swelling of both lower extremities 8/23/2017    Tinea corporis 8/23/2017    Urinary incontinence 8/23/2017    Vitamin D deficiency 8/23/2017      Past Surgical History:   Procedure Laterality Date    ABDOMEN SURGERY PROC UNLISTED      HERNIA    COLONOSCOPY N/A 2/9/2018    COL[facility]  11/12/2014         HX CATARACT REMOVAL Right     HX HEENT      NODULES-VOCAL CORD    HX ORTHOPAEDIC  2004    ELIGIO KNEE REPLACEMENT    HX TONSILLECTOMY  AS CHILD    HX TUBAL LIGATION       Current Outpatient Medications   Medication Sig Dispense Refill    simvastatin (ZOCOR) 40 mg tablet TAKE 1 TABLET DAILY 90 Tab 0    lisinopril-hydroCHLOROthiazide (PRINZIDE, ZESTORETIC) 10-12.5 mg per tablet TAKE 1 TABLET BY MOUTH DAILY 30 Tab 0    RABEprazole (ACIPHEX) 20 mg tablet TAKE 1 TABLET BY MOUTH DAILY 90 Tab 3    potassium chloride SA (MICRO-K) 10 mEq capsule TAKE 2 CAPSULES BY MOUTH DAILY 60 Cap 0    diclofenac (VOLTAREN) 1 % gel Apply  to affected area four (4) times daily.  biotin (VITAMIN B7) 5 mg capsule Take 10 mg by mouth.  clotrimazole-betamethasone (LOTRISONE) topical cream Apply  to affected area two (2) times a day.  mirabegron ER (MYRBETRIQ) 25 mg ER tablet Take 25 mg by mouth daily.  ERGOCALCIFEROL, VITAMIN D2, (VITAMIN D2 PO) Take  by mouth.  omega-3 fatty acids-vitamin e (FISH OIL) 1,000 mg cap Take 1 Cap by mouth daily.        Allergies   Allergen Reactions    Adhesive Tape-Silicones Unknown (comments)       Family History   Problem Relation Age of Onset    Diabetes Mother     Cancer Father     Hypertension Sister     Hypertension Brother     Heart Disease Brother      Social History     Tobacco Use    Smoking status: Never Smoker    Smokeless tobacco: Never Used   Substance Use Topics    Alcohol use: No       Depression Risk Screen     3 most recent PHQ Screens 11/13/2019   Little interest or pleasure in doing things Not at all   Feeling down, depressed, irritable, or hopeless Not at all   Total Score PHQ 2 0       Alcohol Risk Factor Screening:   Do you average 1 drink per night or more than 7 drinks a week:  No    On any one occasion in the past three months have you have had more than 3 drinks containing alcohol:  No      Functional Ability and Level of Safety   Diet: No special diet    Hearing: Hearing is good. Vision Screening:  Vision is good. No exam data present      Activities of Daily Living: The home contains: no safety equipment. Patient does total self care    Ambulation: with no difficulty    Exercise level: moderately active    Fall Risk Screen:  Fall Risk Assessment, last 12 mths 11/13/2019   Able to walk? Yes   Fall in past 12 months? No       Abuse Screen:  Patient is not abused    Screening EKG   EKG order placed: No    Patient Care Team   Patient Care Team:  Je Mcdermott NP as PCP - General (Nurse Practitioner)  Bobbi Hanson MD as Surgeon (General Surgery)     End of Life Planning   Advanced care planning directives were discussed with the patient and /or family/caregiver.      Assessment/Plan   Education and counseling provided:  Are appropriate based on today's review and evaluation        Health Maintenance Due   Topic Date Due    DTaP/Tdap/Td series (1 - Tdap) 03/14/1975    Shingrix Vaccine Age 50> (1 of 2) 03/14/2004    MEDICARE YEARLY EXAM  10/22/2018    GLAUCOMA SCREENING Q2Y  03/14/2019    Bone Densitometry (Dexa) Screening  2019    Pneumococcal 65+ years (1 of 1 - PPSV23) 2019    Influenza Age 5 to Adult  2019   Subjective:  Ms. Meghan Gonzlaez is a pleasant 71year old lady who comes in today for her updated history and physical, as well as Medicare wellness. Since her last visit here she has seen Dr. Kareen Mcmahan at the [facility], who has worked her up for her urinary incontinence. She has been off her Botox injection versus PTNS treatment. She has not yet made a decision what she would like to do. In the meantime, Dr. Ingrid Thomas has kept her on Myrbetriq 50 mg, along with Detrol. It is helping minimally. Past Medical History/Surgeries:    1. Tonsillectomy. 2. Bilateral tubal ligation. 3. Repair of umbilical hernia. 4. Bilateral total knee arthroplasty. 5. Excision of benign vocal cord nodules. 6. Left cataract extraction. Family History:  Father , she was unsure of the cause. Mother  at 80, complications of diabetes, she was hypertensive. Several siblings are living. Social History:  She is . She is retired. She has two sons living and well. Allergies:  Adhesive tape and silicone. Medications:  1. Simvastatin 40 mg daily. 2. Lisinopril/HCTZ 10/12.5 mg daily. 3. Aciphex 20 mg daily. 4. Potassium chloride 10 mEq, two tablets daily. 5. Voltaren 1% Gel to affected areas four times a day. 6. Biotin 5 mg, two tablets daily. 7. Myrbetriq 50 mg daily. 8. Vitamin D2 daily. Habits:  Nonsmoker and a non drinker. Review of Systems:  HEENT:  She does note occasional headaches that typically respond to Aleve. She denies any dizziness, but has had some mild blurred vision. She is in need of getting an eye exam.  CVR:  Denies any chest pain or palpitations. Denies any syncopal episodes. Denies any shortness of breath, cough, wheezing, PND or orthopnea. Denies any ankle edema. GI:  Appetite is good, weight is persistently going up.   Does have a normal bowel pattern without presence of blood in stools or melena. :  Does note urinary incontinence as noted previously. Physical Examination:  GENERAL:  Morbidly obese lady in no acute distress. She is alert and oriented. She answers my questions appropriately. She is unable to get on the examining table. VITALS:  BP: 132/76. P:  88.  R: 18.  T: 97.9. O2 sat: 99. HEENT:  Normocephalic, atraumatic. PERRLA, EOMI. TMs normal.  Mouth mucosa pink. Tongue midline. Pharynx normal.  NECK:  Supple without adenopathy, thyromegaly or carotid bruits. CHEST:  Lungs clear to auscultation, no rales or wheezes. CV:  Heart regular rhythm without murmur. ABDOMEN/BREASTS:  Exam deferred since she could not get on the exam table. EXTREMITIES:  Trace pitting edema in both ankles. No calf tenderness. Distal pulses were present. NEUROLOGIC:  Cranial nerves II-XII intact. Excellent strength in the upper and lower extremities against resistance. Sensation is preserved. Romberg is negative. She had good coordination. Impression:  1. Hypertension, stable. 2. Hyperlipidemia. 3. Overactive bladder syndrome. 4. GERD. 5. Morbid obesity. Plan:  1. She was fasting so therefore it was opted to do all of her lab studies and I will call her as soon as I have her results. Otherwise she will continue with her current regimen and I will see her every six months. 2. Once again we discussed the importance of weight loss.   I have offered to refer her to a nutritionist. [Follow-Up] : a follow-up visit [TextBox_44] : sob/ c/w CHF, new right pleural effusion 2020/ untreated JULIAN

## 2022-10-06 NOTE — ASSESSMENT
[FreeTextEntry1] : Mr. VILLAVICENCIO is a 52 year old male with a history of decreased libido, depression, right sided empyema, gout, hypothyroid, JULIAN, former smoker, presenting to the office today for a follow up pulmonary evaluation. His chief complaint is sob/ c/w CHF, new right pleural effusion from 2020/ untreated JULIAN (on Dx) -improved (exercising)  - improved - but NC with sleep study and fluorscopy\par \par His shortness of breath is multifactorial due to:\par -poor mechanics of breathing \par -out of shape / overweight\par -pulmonary disease\par    -RADs\par -?cardiac disease (Savella)\par \par problem 1: CHF\par -Consult \par -Complete BNP  q 3 months \par \par problem 2: Right sided Pleural Effusion from 2020- resolved \par -repeat Xray- resolved consistent with pleural thickening\par \par Problem 3 abnormal PFT c/w prior restrictive dysfunction\par -improved on PFTs \par \par Problem 3A: ?Diaphragm Dysfunction\par -complete fluoroscopy of the diaphragm (NC)\par \par problem 4: JULIAN (Mallampati class, neck size, fatigue, snoring) - NC\par -Complete home sleep study- pending \par Sleep apnea is associated with adverse clinical consequences which can affect most organ systems.  Cardiovascular disease risk includes arrhythmias, atrial fibrillation, hypertension, coronary artery disease, and stroke. Metabolic disorders include diabetes type 2, non-alcoholic fatty liver disease. Mood disorder especially depression; and cognitive decline especially in the elderly. Associations with  chronic reflux/Mcmillan’s esophagus some but not all inclusive. \par -Reasons  include arousal consistent with hypopnea; respiratory events most prominent in REM sleep or supine position; therefore sleep staging and body position are important for accurate diagnosis and estimation of AHI. \par \par problem 5: cardiac disease\par -recommended to continue to follow up with Cardiologist (Lauren)\par \par problem 6: poor breathing mechanics\par -recommended Wim Hof and Buteyko breathing techniques\par -recommended internet exercise platforms: [employer] and Aerobic exercise for seniors\par -Proper breathing techniques were reviewed with an emphasis of exhalation. Patient instructed to breath in for 1 second and out for four seconds. Patient was encouraged to not talk while walking. \par \par problem 7: out of shape / overweight\par -Recommended Juvencio Mclaughlin's 10-day detox diet and book. \par -recommended "MUNIQ" supplements \par -Weight loss, exercise, and diet control were discussed and are highly encouraged. Treatment options were given such as, aqua therapy, and contacting a nutritionist. Recommended to use the elliptical, stationary bike, less use of treadmill. Mindful eating was explained to the patient Obesity is associated with worsening asthma, shortness of breath, and potential for cardiac disease, diabetes, and other underlying medical conditions\par \par problem 8: health maintenance \par -recommended Sanotize anti viral nasal spray in case of viral infection \par -s/p Covid 19 vaccine x3 \par -recommended yearly flu shot after October 15 (2022 pending)\par -recommended strep pneumonia vaccines: Prevnar-13 vaccine, followed by Pneumo vaccine 23 one year following after 65\par -recommended early intervention for Upper Respiratory Infections (URIs)\par -recommended regular osteoporosis evaluations\par -recommended early dermatological evaluations\par -recommended after the age of 50 to the age of 70, colonoscopy every 5 years\par \par F/U in 6-8 weeks.\par He is encouraged to call with any changes, concerns, or questions\par

## 2022-10-12 DIAGNOSIS — R94.30 ABNORMAL RESULT OF CARDIOVASCULAR FUNCTION STUDY, UNSPECIFIED: ICD-10-CM

## 2022-11-01 ENCOUNTER — APPOINTMENT (OUTPATIENT)
Dept: INTERNAL MEDICINE | Facility: CLINIC | Age: 52
End: 2022-11-01

## 2022-11-21 ENCOUNTER — APPOINTMENT (OUTPATIENT)
Dept: CARDIOLOGY | Facility: CLINIC | Age: 52
End: 2022-11-21

## 2022-11-21 ENCOUNTER — LABORATORY RESULT (OUTPATIENT)
Age: 52
End: 2022-11-21

## 2022-11-21 ENCOUNTER — NON-APPOINTMENT (OUTPATIENT)
Age: 52
End: 2022-11-21

## 2022-11-21 VITALS
HEIGHT: 71 IN | DIASTOLIC BLOOD PRESSURE: 83 MMHG | SYSTOLIC BLOOD PRESSURE: 123 MMHG | BODY MASS INDEX: 33.6 KG/M2 | HEART RATE: 88 BPM | OXYGEN SATURATION: 96 % | WEIGHT: 240 LBS

## 2022-11-21 LAB
ALBUMIN SERPL ELPH-MCNC: 4.6 G/DL
ALP BLD-CCNC: 46 U/L
ALT SERPL-CCNC: 22 U/L
ANION GAP SERPL CALC-SCNC: 10 MMOL/L
APPEARANCE: CLEAR
AST SERPL-CCNC: 22 U/L
BACTERIA: NEGATIVE
BASOPHILS # BLD AUTO: 0.04 K/UL
BASOPHILS NFR BLD AUTO: 0.7 %
BILIRUB SERPL-MCNC: 0.7 MG/DL
BILIRUBIN URINE: NEGATIVE
BLOOD URINE: NEGATIVE
BUN SERPL-MCNC: 14 MG/DL
CALCIUM SERPL-MCNC: 10 MG/DL
CHLORIDE SERPL-SCNC: 100 MMOL/L
CHOLEST SERPL-MCNC: 158 MG/DL
CO2 SERPL-SCNC: 30 MMOL/L
COLOR: NORMAL
CREAT SERPL-MCNC: 1.16 MG/DL
EGFR: 75 ML/MIN/1.73M2
EOSINOPHIL # BLD AUTO: 0.1 K/UL
EOSINOPHIL NFR BLD AUTO: 1.8 %
GLUCOSE QUALITATIVE U: ABNORMAL
GLUCOSE SERPL-MCNC: 86 MG/DL
HCT VFR BLD CALC: 50.8 %
HDLC SERPL-MCNC: 33 MG/DL
HGB BLD-MCNC: 15.8 G/DL
HYALINE CASTS: 0 /LPF
IMM GRANULOCYTES NFR BLD AUTO: 0.2 %
KETONES URINE: NEGATIVE
LDLC SERPL CALC-MCNC: 109 MG/DL
LEUKOCYTE ESTERASE URINE: NEGATIVE
LYMPHOCYTES # BLD AUTO: 1.91 K/UL
LYMPHOCYTES NFR BLD AUTO: 34.8 %
MAN DIFF?: NORMAL
MCHC RBC-ENTMCNC: 27.6 PG
MCHC RBC-ENTMCNC: 31.1 GM/DL
MCV RBC AUTO: 88.8 FL
MICROSCOPIC-UA: NORMAL
MONOCYTES # BLD AUTO: 0.62 K/UL
MONOCYTES NFR BLD AUTO: 11.3 %
NEUTROPHILS # BLD AUTO: 2.81 K/UL
NEUTROPHILS NFR BLD AUTO: 51.2 %
NITRITE URINE: NEGATIVE
NONHDLC SERPL-MCNC: 125 MG/DL
NT-PROBNP SERPL-MCNC: 773 PG/ML
PH URINE: 6.5
PLATELET # BLD AUTO: 206 K/UL
POTASSIUM SERPL-SCNC: 4.8 MMOL/L
PROT SERPL-MCNC: 7.8 G/DL
PROTEIN URINE: NORMAL
RBC # BLD: 5.72 M/UL
RBC # FLD: 13.6 %
RED BLOOD CELLS URINE: 2 /HPF
SODIUM SERPL-SCNC: 140 MMOL/L
SPECIFIC GRAVITY URINE: 1.03
SQUAMOUS EPITHELIAL CELLS: 1 /HPF
TRIGL SERPL-MCNC: 79 MG/DL
TSH SERPL-ACNC: 4.37 UIU/ML
UROBILINOGEN URINE: NORMAL
WBC # FLD AUTO: 5.49 K/UL
WHITE BLOOD CELLS URINE: 1 /HPF

## 2022-11-21 PROCEDURE — 99214 OFFICE O/P EST MOD 30 MIN: CPT | Mod: 25

## 2022-11-21 PROCEDURE — 93000 ELECTROCARDIOGRAM COMPLETE: CPT

## 2022-11-21 RX ORDER — SACUBITRIL AND VALSARTAN 24; 26 MG/1; MG/1
24-26 TABLET, FILM COATED ORAL
Qty: 60 | Refills: 0 | Status: DISCONTINUED | COMMUNITY
Start: 2022-05-24
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
## 2022-11-21 NOTE — DISCUSSION/SUMMARY
[FreeTextEntry1] : Tim recently presented with shortness of breath, and was found to have a severe nonischemic cardiomyopathy.  He is feeling better today, and appears close to euvolemic on exam.  His blood pressure is on the upper side of normal.   His physical exam is unremarkable.  His EKG continues to demonstrate a sinus rhythm, with a nonspecific ST abnormality, and evidence of left ventricular hypertrophy.\par \par His repeat ECHO demonstrated severe global LV dysfunction. Mod LV enlargement. Mild diastolic dysfunction\par Angiogram from 4/2022 demonstrated non obstructive CAD.\par \par We discussed his medication regimen in detail.  I am increasing Coreg to 25 q12. He will cont hydralazine 25 twice daily, and entresto 49/51 mg BID. Most recent blood work revealed K+ of 5.3 and creat of 1.18.\par He will continue lasix 40mg daily and will remain off spironolactone because of gout. He is now on Jardiance. I am sending BW today.\par \par He will continue life style modifications such as diet and exercise for optimal cardiovascular benefits. \par He will follow up in 3 months, sooner if questions or concerns arise . [EKG obtained to assist in diagnosis and management of assessed problem(s)] : EKG obtained to assist in diagnosis and management of assessed problem(s)

## 2022-11-21 NOTE — CARDIOLOGY SUMMARY
[de-identified] : 5/20/22: sr, nsst abn, lvh\par 8/16/22:\par SR, LVH\par \par 9/20/22\par SR/LVH [de-identified] : 7/2022\par EF 29%\par severe global LV dysfunction- Mod LV enlargement\par mild diastolic stage 1 [de-identified] : 4/2022\par NON obstructive \par

## 2022-11-22 LAB
ESTIMATED AVERAGE GLUCOSE: 126 MG/DL
HBA1C MFR BLD HPLC: 6 %

## 2022-11-29 ENCOUNTER — RX RENEWAL (OUTPATIENT)
Age: 52
End: 2022-11-29

## 2022-12-05 ENCOUNTER — APPOINTMENT (OUTPATIENT)
Dept: INTERNAL MEDICINE | Facility: CLINIC | Age: 52
End: 2022-12-05

## 2022-12-05 VITALS
HEIGHT: 71 IN | RESPIRATION RATE: 14 BRPM | HEART RATE: 68 BPM | TEMPERATURE: 96.5 F | SYSTOLIC BLOOD PRESSURE: 120 MMHG | BODY MASS INDEX: 34.44 KG/M2 | WEIGHT: 246 LBS | OXYGEN SATURATION: 98 % | DIASTOLIC BLOOD PRESSURE: 70 MMHG

## 2022-12-05 PROCEDURE — 99396 PREV VISIT EST AGE 40-64: CPT

## 2022-12-05 NOTE — HISTORY OF PRESENT ILLNESS
[FreeTextEntry1] : Here for physical.\par Feeling well\par Pt was diagnosed with systolic CHF, nonischemic cardiomyopathy during the past year. \par Vaxed and boosted against covid. [de-identified] : Former smoker who quit 40 years ago. Social alcohol.\par Hasn't had a colonoscopy\par Minimal exercise\par Pt reports that he is breathing comfortably\par

## 2022-12-05 NOTE — ASSESSMENT
[FreeTextEntry1] : Pt is feeling well.\par He reports that he is breathing comfortably and has enough energy to do his job.

## 2022-12-05 NOTE — PLAN
[FreeTextEntry1] : Follow up with Dr. Murphy, Pulmonary, regarding JULIAN\par Follow up with Dr. lawrence, Cardiology

## 2023-01-06 ENCOUNTER — NON-APPOINTMENT (OUTPATIENT)
Age: 53
End: 2023-01-06

## 2023-01-06 ENCOUNTER — APPOINTMENT (OUTPATIENT)
Dept: PULMONOLOGY | Facility: CLINIC | Age: 53
End: 2023-01-06
Payer: MEDICAID

## 2023-01-06 VITALS
RESPIRATION RATE: 14 BRPM | SYSTOLIC BLOOD PRESSURE: 122 MMHG | BODY MASS INDEX: 34.3 KG/M2 | HEIGHT: 71 IN | HEART RATE: 81 BPM | OXYGEN SATURATION: 98 % | TEMPERATURE: 96.5 F | WEIGHT: 245 LBS | DIASTOLIC BLOOD PRESSURE: 86 MMHG

## 2023-01-06 DIAGNOSIS — R06.83 SNORING: ICD-10-CM

## 2023-01-06 PROCEDURE — 95012 NITRIC OXIDE EXP GAS DETER: CPT

## 2023-01-06 PROCEDURE — 94010 BREATHING CAPACITY TEST: CPT

## 2023-01-06 PROCEDURE — 99214 OFFICE O/P EST MOD 30 MIN: CPT | Mod: 25

## 2023-01-06 NOTE — REASON FOR VISIT
[Follow-Up] : a follow-up visit [TextBox_44] : sob/ c/w CHF, new right pleural effusion 2020/ untreated JULIAN (mixed)

## 2023-01-06 NOTE — PROCEDURE
[FreeTextEntry1] : PFT reveals mild restriction and obstruction, with an FEV1 of  2.19L, which is 64% of predicted, with a normal flow volume loop \par \par FENO was 12; a normal value being less than 25\par Fractional exhaled nitric oxide (FENO) is regarded as a simple, noninvasive method for assessing eosinophilic airway inflammation. Produced by a variety of cells within the lung, nitric oxide (NO) concentrations are generally low in healthy individuals. However, high concentrations of NO appear to be involved in nonspecific host defense mechanisms and chronic inflammatory diseases such as asthma. The American Thoracic Society (ATS) therefore has recommended using FENO to aid in the diagnosis and monitoring of eosinophilic airway inflammation and asthma, and for identifying steroid responsive individuals whose chronic respiratory symptoms may be caused by airway inflammation.

## 2023-01-06 NOTE — ASSESSMENT
[FreeTextEntry1] : Mr. VILLAVICENCIO is a 52 year old male with a history of decreased libido, depression, right sided empyema, gout, hypothyroid, JULIAN, former smoker, presenting to the office today for a follow up pulmonary evaluation. His chief complaint is sob/ c/w CHF, new right pleural effusion from 2020/ untreated JULIAN (on Dx) -improved (exercising)  - improved - mixed sleep apnea/ and NC fluorscopy\par \par His shortness of breath is multifactorial due to:\par -poor mechanics of breathing \par -out of shape / overweight\par -pulmonary disease\par    -RADs\par -?cardiac disease ([place])\par \par problem 1: CHF\par -Consult \par -Complete BNP  q 3 months \par \par problem 2: Right sided Pleural Effusion from 2020- resolved \par -repeat Xray- resolved consistent with pleural thickening\par \par Problem 3 abnormal PFT c/w prior restrictive dysfunction\par -improved on PFTs \par \par Problem 3A: ?Diaphragm Dysfunction\par -complete fluoroscopy of the diaphragm (NC)\par \par problem 4: JULIAN (Mallampati class, neck size, fatigue, snoring) - (mixed central. obstructive) \par -home sleep study- -2nd SS, Empiric APAP machine with back up rate 10/hour \par Sleep apnea is associated with adverse clinical consequences which can affect most organ systems.  Cardiovascular disease risk includes arrhythmias, atrial fibrillation, hypertension, coronary artery disease, and stroke. Metabolic disorders include diabetes type 2, non-alcoholic fatty liver disease. Mood disorder especially depression; and cognitive decline especially in the elderly. Associations with  chronic reflux/Mcmillan’s esophagus some but not all inclusive. \par -Reasons  include arousal consistent with hypopnea; respiratory events most prominent in REM sleep or supine position; therefore sleep staging and body position are important for accurate diagnosis and estimation of AHI. \par \par problem 5: cardiac disease\par -recommended to continue to follow up with Cardiologist (Lauren)\par \par problem 6: poor breathing mechanics\par -recommended Wim Hof and Buteyko breathing techniques\par -recommended internet exercise platforms: [employer] and Aerobic exercise for seniors\par -Proper breathing techniques were reviewed with an emphasis of exhalation. Patient instructed to breath in for 1 second and out for four seconds. Patient was encouraged to not talk while walking. \par \par problem 7: out of shape / overweight\par -Recommended Juvencio Arnoldo's 10-day detox diet and book. \par -recommended "MUNIQ" supplements \par -Weight loss, exercise, and diet control were discussed and are highly encouraged. Treatment options were given such as, aqua therapy, and contacting a nutritionist. Recommended to use the elliptical, stationary bike, less use of treadmill. Mindful eating was explained to the patient Obesity is associated with worsening asthma, shortness of breath, and potential for cardiac disease, diabetes, and other underlying medical conditions\par \par problem 8: health maintenance \par -recommended Sanotize anti viral nasal spray in case of viral infection \par -s/p Covid 19 vaccine x3 \par -recommended yearly flu shot after October 15 (2022 pending)\par -recommended strep pneumonia vaccines: Prevnar-13 vaccine, followed by Pneumo vaccine 23 one year following after 65\par -recommended early intervention for Upper Respiratory Infections (URIs)\par -recommended regular osteoporosis evaluations\par -recommended early dermatological evaluations\par -recommended after the age of 50 to the age of 70, colonoscopy every 5 years\par \par F/U in 6-8 weeks.\par He is encouraged to call with any changes, concerns, or questions\par

## 2023-01-06 NOTE — HISTORY OF PRESENT ILLNESS
[TextBox_4] : Mr. VILLAVICENCIO is a 52 year old male with a history of decreased libido, depression, right sided empiema, gout, hypothyroid, JULIAN, former smoker, presenting to the office today for a f/u pulmonary evaluation. His chief complaint is\par - he notes feeling well in general\par - he notes not sleeping well\par - he notes energy level could be better\par - He reports being able to fall asleep while watching a boring TV show \par - he notes memory is reasonable \par - he notes concentration could be better\par - he notes losing some weight \par - he notes appetite is good \par \par - He  denies any headaches, nausea, vomiting, fever, chills, sweats, chest pains, chest pressure, diarrhea, constipation, dysphagia, myalgia, dizziness, leg swelling, leg pain, itchy eyes, itchy ears, heartburn, reflux, or sour taste in the mouth.

## 2023-01-06 NOTE — ADDENDUM
[FreeTextEntry1] : Documented by Josue Ferraro acting as a scribe for Dr. Brendan Murphy on (01/06/2023).\par \par All medical record entries made by the Scribe were at my, Dr. Brendan Murphy's, direction and personally dictated by me on (01/06/2023). I have reviewed the chart and agree that the record accurately reflects my personal performance of the history, physical exam, assessment and plan. I have personally directed, reviewed, and agree with the discharge instructions.

## 2023-01-13 ENCOUNTER — RESULT REVIEW (OUTPATIENT)
Age: 53
End: 2023-01-13

## 2023-01-13 ENCOUNTER — NON-APPOINTMENT (OUTPATIENT)
Age: 53
End: 2023-01-13

## 2023-01-13 ENCOUNTER — APPOINTMENT (OUTPATIENT)
Dept: RADIOLOGY | Facility: HOSPITAL | Age: 53
End: 2023-01-13

## 2023-01-13 ENCOUNTER — OUTPATIENT (OUTPATIENT)
Dept: OUTPATIENT SERVICES | Facility: HOSPITAL | Age: 53
LOS: 1 days | End: 2023-01-13
Payer: MEDICAID

## 2023-01-13 DIAGNOSIS — J98.6 DISORDERS OF DIAPHRAGM: ICD-10-CM

## 2023-01-13 DIAGNOSIS — Z98.890 OTHER SPECIFIED POSTPROCEDURAL STATES: Chronic | ICD-10-CM

## 2023-01-13 PROCEDURE — 76000 FLUOROSCOPY <1 HR PHYS/QHP: CPT

## 2023-01-13 PROCEDURE — 76000 FLUOROSCOPY <1 HR PHYS/QHP: CPT | Mod: 26

## 2023-01-26 ENCOUNTER — RX RENEWAL (OUTPATIENT)
Age: 53
End: 2023-01-26

## 2023-03-01 ENCOUNTER — NON-APPOINTMENT (OUTPATIENT)
Age: 53
End: 2023-03-01

## 2023-03-01 ENCOUNTER — APPOINTMENT (OUTPATIENT)
Dept: CARDIOLOGY | Facility: CLINIC | Age: 53
End: 2023-03-01
Payer: MEDICAID

## 2023-03-01 VITALS — SYSTOLIC BLOOD PRESSURE: 139 MMHG | DIASTOLIC BLOOD PRESSURE: 90 MMHG

## 2023-03-01 VITALS
HEART RATE: 88 BPM | BODY MASS INDEX: 34.72 KG/M2 | OXYGEN SATURATION: 99 % | DIASTOLIC BLOOD PRESSURE: 90 MMHG | SYSTOLIC BLOOD PRESSURE: 156 MMHG | WEIGHT: 248 LBS | HEIGHT: 71 IN

## 2023-03-01 DIAGNOSIS — R94.31 ABNORMAL ELECTROCARDIOGRAM [ECG] [EKG]: ICD-10-CM

## 2023-03-01 PROCEDURE — 93000 ELECTROCARDIOGRAM COMPLETE: CPT

## 2023-03-01 PROCEDURE — 99214 OFFICE O/P EST MOD 30 MIN: CPT | Mod: 25
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
## 2023-03-01 NOTE — DISCUSSION/SUMMARY
[FreeTextEntry1] : In 2022, Tim presented with shortness of breath, and was found to have a severe nonischemic cardiomyopathy.  He is feeling better today, and appears close to euvolemic on exam.  His blood pressure is elevated, though did not take his medications this morning.   His physical exam is unremarkable.  His EKG continues to demonstrate a sinus rhythm, with a nonspecific ST abnormality, and evidence of left ventricular hypertrophy.\par \par His repeat ECHO demonstrated severe global LV dysfunction. Mod LV enlargement. Mild diastolic dysfunction\par Angiogram from 4/2022 demonstrated non obstructive CAD.\par \par We discussed his medication regimen in detail, and the importance of medication compliance.  He will continue Coreg to 25 q12. He will cont hydralazine 25 twice daily, and entresto 49/51 mg BID. Most recent blood work revealed K+ of 4.8 and creat of 1.16.\par He will continue lasix 40mg daily and will remain off spironolactone because of gout. He is now on Jardiance.\par \par He will continue life style modifications such as diet and exercise for optimal cardiovascular benefits. He promises to start exercising. He will be compliant with his CPAP.\par \par He will follow up in 3 months, sooner if questions or concerns arise . [EKG obtained to assist in diagnosis and management of assessed problem(s)] : EKG obtained to assist in diagnosis and management of assessed problem(s)

## 2023-03-01 NOTE — CARDIOLOGY SUMMARY
[de-identified] : 5/20/22: sr, nsst abn, lvh\par 8/16/22:\par SR, LVH\par \par 9/20/22\par SR/LVH [de-identified] : 7/2022\par EF 29%\par severe global LV dysfunction- Mod LV enlargement\par mild diastolic stage 1 [de-identified] : 4/2022\par NON obstructive \par

## 2023-03-16 ENCOUNTER — TRANSCRIPTION ENCOUNTER (OUTPATIENT)
Age: 53
End: 2023-03-16

## 2023-03-16 ENCOUNTER — APPOINTMENT (OUTPATIENT)
Dept: INTERNAL MEDICINE | Facility: CLINIC | Age: 53
End: 2023-03-16
Payer: MEDICAID

## 2023-03-16 VITALS
HEIGHT: 71 IN | TEMPERATURE: 97.7 F | DIASTOLIC BLOOD PRESSURE: 84 MMHG | RESPIRATION RATE: 14 BRPM | HEART RATE: 84 BPM | WEIGHT: 248 LBS | BODY MASS INDEX: 34.72 KG/M2 | SYSTOLIC BLOOD PRESSURE: 124 MMHG | OXYGEN SATURATION: 98 %

## 2023-03-16 DIAGNOSIS — K62.89 OTHER SPECIFIED DISEASES OF ANUS AND RECTUM: ICD-10-CM

## 2023-03-16 DIAGNOSIS — L29.3 ANOGENITAL PRURITUS, UNSPECIFIED: ICD-10-CM

## 2023-03-16 DIAGNOSIS — N48.1 BALANITIS: ICD-10-CM

## 2023-03-16 PROCEDURE — 99214 OFFICE O/P EST MOD 30 MIN: CPT

## 2023-03-16 RX ORDER — NYSTATIN AND TRIAMCINOLONE ACETONIDE 100000; 1 MG/G; MG/G
100000-0.1 CREAM TOPICAL TWICE DAILY
Qty: 1 | Refills: 1 | Status: ACTIVE | COMMUNITY
Start: 2023-03-16 | End: 1900-01-01

## 2023-03-16 NOTE — HISTORY OF PRESENT ILLNESS
[FreeTextEntry8] : Pt feels like there is a "sharp bone" stuck in his rectum that causes him discomfort.\par Pt is c/o penile itching for 3 days a. It started after a recent sexual encounter, There is no discharge.

## 2023-03-19 LAB
C TRACH RRNA SPEC QL NAA+PROBE: NOT DETECTED
HAV IGM SER QL: NONREACTIVE
HBV CORE IGM SER QL: NONREACTIVE
HBV SURFACE AG SER QL: NONREACTIVE
HCV AB SER QL: NONREACTIVE
HCV S/CO RATIO: 0.1 S/CO
HIV1+2 AB SPEC QL IA.RAPID: NONREACTIVE
HSV 1+2 IGG SER IA-IMP: NEGATIVE
HSV 1+2 IGG SER IA-IMP: NEGATIVE
HSV1 IGG SER QL: 0.41 INDEX
HSV2 IGG SER QL: 0.21 INDEX
N GONORRHOEA RRNA SPEC QL NAA+PROBE: NOT DETECTED
SOURCE AMPLIFICATION: NORMAL
T PALLIDUM AB SER QL IA: NEGATIVE

## 2023-03-22 LAB
HSV1 IGM SER QL: NEGATIVE
HSV2 AB FLD-ACNC: NEGATIVE

## 2023-04-03 ENCOUNTER — APPOINTMENT (OUTPATIENT)
Dept: PULMONOLOGY | Facility: CLINIC | Age: 53
End: 2023-04-03
Payer: MEDICAID

## 2023-04-03 VITALS
TEMPERATURE: 97.2 F | HEIGHT: 71 IN | BODY MASS INDEX: 33.88 KG/M2 | OXYGEN SATURATION: 96 % | DIASTOLIC BLOOD PRESSURE: 70 MMHG | WEIGHT: 242 LBS | RESPIRATION RATE: 16 BRPM | HEART RATE: 73 BPM | SYSTOLIC BLOOD PRESSURE: 120 MMHG

## 2023-04-03 PROCEDURE — 94727 GAS DIL/WSHOT DETER LNG VOL: CPT

## 2023-04-03 PROCEDURE — 99214 OFFICE O/P EST MOD 30 MIN: CPT | Mod: 25

## 2023-04-03 PROCEDURE — 94010 BREATHING CAPACITY TEST: CPT

## 2023-04-03 PROCEDURE — 95012 NITRIC OXIDE EXP GAS DETER: CPT

## 2023-04-03 PROCEDURE — 94729 DIFFUSING CAPACITY: CPT

## 2023-04-03 NOTE — ASSESSMENT
[FreeTextEntry1] : Mr. VILLAVICENCIO is a 52 year old male with a history of decreased libido, depression, right sided empyema, gout, hypothyroid, JULIAN, former smoker, presenting to the office today for a follow up pulmonary evaluation. His chief complaint is sob/ c/w CHF, new right pleural effusion from 2020/ untreated JULIAN (on Dx) -improved (exercising)  - improved - mixed sleep apnea- now treated \par \par His shortness of breath is multifactorial due to:\par -poor mechanics of breathing \par -out of shape / overweight\par -pulmonary disease\par    -RADs\par -?cardiac disease ([place])\par \par problem 1: CHF\par -Consult \par -Complete BNP  q 3 months, Echo 6/2023\par \par problem 2: Right sided Pleural Effusion from 2020- resolved \par -repeat Xray- resolved consistent with pleural thickening (blunt Rcp Angle)\par \par Problem 3 abnormal PFT c/w prior restrictive dysfunction\par -improved on PFTs \par \par Problem 3A: Diaphragm Dysfunction (WNL)\par -s/p complete fluoroscopy of the diaphragm- 1/2023 (WNL)\par \par problem 4: JULIAN (Mallampati class, neck size, fatigue, snoring) - (mixed central. obstructive) \par -using the machine and benefitting very well \par -s/p home sleep study- -SS, APAP machine with back up rate 10/hour in place ([employer]) \par Sleep apnea is associated with adverse clinical consequences which can affect most organ systems.  Cardiovascular disease risk includes arrhythmias, atrial fibrillation, hypertension, coronary artery disease, and stroke. Metabolic disorders include diabetes type 2, non-alcoholic fatty liver disease. Mood disorder especially depression; and cognitive decline especially in the elderly. Associations with  chronic reflux/Mcmillan’s esophagus some but not all inclusive. \par -Reasons  include arousal consistent with hypopnea; respiratory events most prominent in REM sleep or supine position; therefore sleep staging and body position are important for accurate diagnosis and estimation of AHI. \par \par problem 5: cardiac disease\par -recommended to continue to follow up with Cardiologist (Lauren)\par \par problem 6: poor breathing mechanics\par -recommended Wim Hof and Buteyko breathing techniques\par -recommended internet exercise platforms: [employer] and Aerobic exercise for seniors\par -Proper breathing techniques were reviewed with an emphasis of exhalation. Patient instructed to breath in for 1 second and out for four seconds. Patient was encouraged to not talk while walking. \par \par problem 7: out of shape / overweight\par -Recommended Juvencio Mclaughlin's 10-day detox diet and book. \par -recommended "MUNIQ" supplements \par -Weight loss, exercise, and diet control were discussed and are highly encouraged. Treatment options were given such as, aqua therapy, and contacting a nutritionist. Recommended to use the elliptical, stationary bike, less use of treadmill. Mindful eating was explained to the patient Obesity is associated with worsening asthma, shortness of breath, and potential for cardiac disease, diabetes, and other underlying medical conditions\par \par problem 8: health maintenance \par -recommended Sanotize anti viral nasal spray in case of viral infection \par -s/p Covid 19 vaccine x3 \par -recommended yearly flu shot after October 15 (2022 pending)\par -recommended strep pneumonia vaccines: Prevnar-13 vaccine, followed by Pneumo vaccine 23 one year following after 65\par -recommended early intervention for Upper Respiratory Infections (URIs)\par -recommended regular osteoporosis evaluations\par -recommended early dermatological evaluations\par -recommended after the age of 50 to the age of 70, colonoscopy every 5 years\par \par F/U in 6-8 weeks.\par He is encouraged to call with any changes, concerns, or questions\par

## 2023-04-03 NOTE — REASON FOR VISIT
[Follow-Up] : a follow-up visit [TextBox_44] : sob/ c/w CHF, s/p right pleural effusion 2020/ JULIAN (mixed)- treated

## 2023-04-03 NOTE — ADDENDUM
[FreeTextEntry1] : Documented by Josue Ferraro acting as a scribe for Dr. Brendan Murphy on (04/03/2023).\par \par All medical record entries made by the Scribe were at my, Dr. Brendan Murphy's, direction and personally dictated by me on (04/03/2023). I have reviewed the chart and agree that the record accurately reflects my personal performance of the history, physical exam, assessment and plan. I have personally directed, reviewed, and agree with the discharge instructions.

## 2023-04-03 NOTE — HISTORY OF PRESENT ILLNESS
[TextBox_4] : Mr. VILLAVICENCIO is a 52 year old male with a history of decreased libido, depression, right sided empiema, gout, hypothyroid, JULIAN, former smoker, presenting to the office today for a f/u pulmonary evaluation. His chief complaint is\par - he notes feeling well in general \par - he notes using the machine regularly and sleeping well \par - he notes weight is stable \par - he notes drinking enough water \par - he notes wanting to start exercising soon \par - he notes senses of smell and taste are good \par - he notes bowels are regular usually \par - he notes work is busy \par \par - He  denies any headaches, nausea, vomiting, fever, chills, sweats, chest pains, chest pressure, diarrhea, dysphagia, myalgia, dizziness, leg swelling, leg pain, itchy eyes, itchy ears, heartburn, reflux, or sour taste in the mouth.

## 2023-04-03 NOTE — PROCEDURE
[FreeTextEntry1] : Xray Fluoroscopy (01/13/2023) revealed no evidence of diaphragmatic paralysis.\par \par Full PFT- spi reveals mild restrictive and mild obstructive; FEV1 was 2.50L which is 64% of predicted; normal lung volumes; normal diffusion at 23.1, which is 77% of predicted; normal flow volume loop \par \par FENO was 10; a normal value being less than 25\par Fractional exhaled nitric oxide (FENO) is regarded as a simple, noninvasive method for assessing eosinophilic airway inflammation. Produced by a variety of cells within the lung, nitric oxide (NO) concentrations are generally low in healthy individuals. However, high concentrations of NO appear to be involved in nonspecific host defense mechanisms and chronic inflammatory diseases such as asthma. The American Thoracic Society (ATS) therefore has recommended using FENO to aid in the diagnosis and monitoring of eosinophilic airway inflammation and asthma, and for identifying steroid responsive individuals whose chronic respiratory symptoms may be caused by airway inflammation.

## 2023-04-17 NOTE — PATIENT PROFILE ADULT - DISASTER - FALL HARM RISK TYPE OF ASSESSMENT
PROVIDER:[TOKEN:[[id number]]] admission PROVIDER:[TOKEN:[[id number]]],FREE:[LAST:[Primary doctor],PHONE:[(   )    -],FAX:[(   )    -]]

## 2023-05-12 ENCOUNTER — RX RENEWAL (OUTPATIENT)
Age: 53
End: 2023-05-12

## 2023-05-12 RX ORDER — FUROSEMIDE 40 MG/1
40 TABLET ORAL
Qty: 90 | Refills: 3 | Status: ACTIVE | COMMUNITY
Start: 2022-05-20 | End: 1900-01-01

## 2023-05-22 ENCOUNTER — RX RENEWAL (OUTPATIENT)
Age: 53
End: 2023-05-22

## 2023-05-30 ENCOUNTER — RX RENEWAL (OUTPATIENT)
Age: 53
End: 2023-05-30

## 2023-06-05 ENCOUNTER — APPOINTMENT (OUTPATIENT)
Dept: CARDIOLOGY | Facility: CLINIC | Age: 53
End: 2023-06-05
Payer: MEDICAID

## 2023-06-05 ENCOUNTER — NON-APPOINTMENT (OUTPATIENT)
Age: 53
End: 2023-06-05

## 2023-06-05 VITALS
HEART RATE: 80 BPM | SYSTOLIC BLOOD PRESSURE: 134 MMHG | HEIGHT: 71 IN | OXYGEN SATURATION: 98 % | BODY MASS INDEX: 36.68 KG/M2 | WEIGHT: 262 LBS | DIASTOLIC BLOOD PRESSURE: 75 MMHG

## 2023-06-05 PROCEDURE — 99214 OFFICE O/P EST MOD 30 MIN: CPT | Mod: 25

## 2023-06-05 PROCEDURE — 93000 ELECTROCARDIOGRAM COMPLETE: CPT
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
## 2023-06-05 NOTE — DISCUSSION/SUMMARY
[FreeTextEntry1] : In 2022, Tim presented with shortness of breath, and was found to have a severe nonischemic cardiomyopathy.  He is feeling better today, and appears close to euvolemic on exam, though seems to have gained weight.  His blood pressure is acceptable.   His physical exam is unremarkable.  His EKG continues to demonstrate a sinus rhythm, with a frequent PVCs,  nonspecific ST abnormality, and evidence of left ventricular hypertrophy.\par \par He will continue his current regimen of Coreg, Hydralazine, Entresto, and Jardiance. I am repeating a full set of BW and an echocardiogram.  He has not been able to tolerate spironolactone, though will continue Lasix.\par \par He will continue life style modifications such as diet and exercise for optimal cardiovascular benefits. He promises to start exercising. He will be compliant with his CPAP.\par \par He will follow up in 3 months, sooner if questions or concerns arise . [EKG obtained to assist in diagnosis and management of assessed problem(s)] : EKG obtained to assist in diagnosis and management of assessed problem(s)

## 2023-06-05 NOTE — CARDIOLOGY SUMMARY
[de-identified] : 5/20/22: sr, nsst abn, lvh\par 8/16/22:\par SR, LVH\par \par 9/20/22\par SR/LVH [de-identified] : 7/2022\par EF 29%\par severe global LV dysfunction- Mod LV enlargement\par mild diastolic stage 1 [de-identified] : 4/2022\par NON obstructive \par

## 2023-06-11 NOTE — ED ADULT TRIAGE NOTE - MODE OF ARRIVAL
[employer] +R fourth toe: ttp over distal toe, ecchymoses over volar base of toe and proximal volar base of foot +R fourth toe: ttp over distal toe, ecchymoses over volar base of toe and proximal volar base of foot    Attending/Neelam: NAD, Rt foot-+swelling of 4th metatarsal, plantar swollen, ecchymotic, no crepitus, no increase warmth; +2 DP/PT Walk in

## 2023-07-23 ENCOUNTER — RX RENEWAL (OUTPATIENT)
Age: 53
End: 2023-07-23

## 2023-08-20 NOTE — HISTORY OF PRESENT ILLNESS
[TextBox_4] : Mr. VILLAVICENCIO is a 52 year old male with a history of decreased libido, depression, right sided empiema, gout, hypothyroid, JULIAN, former smoker, presenting to the office today for a f/p  pulmonary evaluation. His chief complaint is   -he denies any headaches, nausea, emesis, fever, chills, sweats, chest pain, chest pressure, coughing, wheezing, palpitations, diarrhea, constipation, dysphagia, vertigo, arthralgias, myalgias, leg swelling, itchy eyes, itchy ears, heartburn, reflux, or sour taste in the mouth.

## 2023-08-20 NOTE — ASSESSMENT
[FreeTextEntry1] : Mr. VILLAVICENCIO is a 52 year old male with a history of decreased libido, depression, right sided empyema, gout, hypothyroid, JULIAN, former smoker, presenting to the office today for a follow up pulmonary evaluation. His chief complaint is sob/ c/w CHF, new right pleural effusion from 2020/ untreated JULIAN (on Dx) -improved (exercising)  - improved - mixed sleep apnea- now treated   His shortness of breath is multifactorial due to: -poor mechanics of breathing  -out of shape / overweight -pulmonary disease    -RADs -?cardiac disease ([place])  problem 1: CHF -Consult  -Complete BNP  q 3 months, Echo 6/2023  problem 2: Right sided Pleural Effusion from 2020- resolved  -repeat Xray- resolved consistent with pleural thickening (blunt Rcp Angle)  Problem 3 abnormal PFT c/w prior restrictive dysfunction -improved on PFTs   Problem 3A: Diaphragm Dysfunction (WNL) -s/p complete fluoroscopy of the diaphragm- 1/2023 (WNL)  problem 4: JULIAN (Mallampati class, neck size, fatigue, snoring) - (mixed central. obstructive)  -using the machine and benefitting very well  -s/p home sleep study- -SS, APAP machine with back up rate 10/hour in place ([facility])  Sleep apnea is associated with adverse clinical consequences which can affect most organ systems.  Cardiovascular disease risk includes arrhythmias, atrial fibrillation, hypertension, coronary artery disease, and stroke. Metabolic disorders include diabetes type 2, non-alcoholic fatty liver disease. Mood disorder especially depression; and cognitive decline especially in the elderly. Associations with  chronic reflux/Mcmillan's esophagus some but not all inclusive.  -Reasons  include arousal consistent with hypopnea; respiratory events most prominent in REM sleep or supine position; therefore sleep staging and body position are important for accurate diagnosis and estimation of AHI.   problem 5: cardiac disease -recommended to continue to follow up with Cardiologist (Lauren)  problem 6: poor breathing mechanics -recommended Corina Sylvester breathing techniques -recommended internet exercise platforms: [employer] and Aerobic exercise for seniors -Proper breathing techniques were reviewed with an emphasis of exhalation. Patient instructed to breath in for 1 second and out for four seconds. Patient was encouraged to not talk while walking.   problem 7: out of shape / overweight -Recommended Juvencio Mclaughlin's 10-day detox diet and book.  -recommended "MUNIQ" supplements  -Weight loss, exercise, and diet control were discussed and are highly encouraged. Treatment options were given such as, aqua therapy, and contacting a nutritionist. Recommended to use the elliptical, stationary bike, less use of treadmill. Mindful eating was explained to the patient Obesity is associated with worsening asthma, shortness of breath, and potential for cardiac disease, diabetes, and other underlying medical conditions  problem 8: health maintenance  -recommended Sanotize anti viral nasal spray in case of viral infection  -s/p Covid 19 vaccine x3  -recommended yearly flu shot after October 15 (2022 pending) -recommended strep pneumonia vaccines: Prevnar-13 vaccine, followed by Pneumo vaccine 23 one year following after 65 -recommended early intervention for Upper Respiratory Infections (URIs) -recommended regular osteoporosis evaluations -recommended early dermatological evaluations -recommended after the age of 50 to the age of 70, colonoscopy every 5 years  F/U in 6-8 weeks. He is encouraged to call with any changes, concerns, or questions

## 2023-08-23 ENCOUNTER — APPOINTMENT (OUTPATIENT)
Dept: PULMONOLOGY | Facility: CLINIC | Age: 53
End: 2023-08-23

## 2023-09-11 ENCOUNTER — APPOINTMENT (OUTPATIENT)
Dept: CARDIOLOGY | Facility: CLINIC | Age: 53
End: 2023-09-11

## 2023-09-18 ENCOUNTER — NON-APPOINTMENT (OUTPATIENT)
Age: 53
End: 2023-09-18

## 2023-09-18 ENCOUNTER — APPOINTMENT (OUTPATIENT)
Dept: CARDIOLOGY | Facility: CLINIC | Age: 53
End: 2023-09-18
Payer: MEDICAID

## 2023-09-18 VITALS
WEIGHT: 263 LBS | BODY MASS INDEX: 36.82 KG/M2 | HEART RATE: 84 BPM | SYSTOLIC BLOOD PRESSURE: 129 MMHG | OXYGEN SATURATION: 97 % | HEIGHT: 71 IN | DIASTOLIC BLOOD PRESSURE: 87 MMHG

## 2023-09-18 PROCEDURE — 93000 ELECTROCARDIOGRAM COMPLETE: CPT

## 2023-09-18 PROCEDURE — 99214 OFFICE O/P EST MOD 30 MIN: CPT | Mod: 25

## 2023-09-29 ENCOUNTER — APPOINTMENT (OUTPATIENT)
Dept: INTERNAL MEDICINE | Facility: CLINIC | Age: 53
End: 2023-09-29
Payer: MEDICAID

## 2023-09-29 VITALS
TEMPERATURE: 98.1 F | DIASTOLIC BLOOD PRESSURE: 74 MMHG | HEART RATE: 58 BPM | HEIGHT: 71 IN | OXYGEN SATURATION: 93 % | RESPIRATION RATE: 16 BRPM | SYSTOLIC BLOOD PRESSURE: 140 MMHG | BODY MASS INDEX: 35 KG/M2 | WEIGHT: 250 LBS

## 2023-09-29 DIAGNOSIS — I42.8 OTHER CARDIOMYOPATHIES: ICD-10-CM

## 2023-09-29 DIAGNOSIS — G47.33 OBSTRUCTIVE SLEEP APNEA (ADULT) (PEDIATRIC): ICD-10-CM

## 2023-09-29 DIAGNOSIS — Z23 ENCOUNTER FOR IMMUNIZATION: ICD-10-CM

## 2023-09-29 PROCEDURE — 90686 IIV4 VACC NO PRSV 0.5 ML IM: CPT

## 2023-09-29 PROCEDURE — 99214 OFFICE O/P EST MOD 30 MIN: CPT | Mod: 25

## 2023-09-29 PROCEDURE — G0008: CPT

## 2023-10-16 ENCOUNTER — APPOINTMENT (OUTPATIENT)
Dept: PULMONOLOGY | Facility: CLINIC | Age: 53
End: 2023-10-16
Payer: MEDICAID

## 2023-10-16 VITALS
HEART RATE: 70 BPM | SYSTOLIC BLOOD PRESSURE: 140 MMHG | HEIGHT: 71 IN | WEIGHT: 252 LBS | OXYGEN SATURATION: 96 % | RESPIRATION RATE: 16 BRPM | DIASTOLIC BLOOD PRESSURE: 78 MMHG | BODY MASS INDEX: 35.28 KG/M2 | TEMPERATURE: 97.1 F

## 2023-10-16 DIAGNOSIS — J90 PLEURAL EFFUSION, NOT ELSEWHERE CLASSIFIED: ICD-10-CM

## 2023-10-16 DIAGNOSIS — R94.2 ABNORMAL RESULTS OF PULMONARY FUNCTION STUDIES: ICD-10-CM

## 2023-10-16 PROCEDURE — 95012 NITRIC OXIDE EXP GAS DETER: CPT

## 2023-10-16 PROCEDURE — 94010 BREATHING CAPACITY TEST: CPT

## 2023-10-16 PROCEDURE — 99214 OFFICE O/P EST MOD 30 MIN: CPT | Mod: 25

## 2023-10-16 RX ORDER — CIPROFLOXACIN HYDROCHLORIDE 500 MG/1
500 TABLET, FILM COATED ORAL TWICE DAILY
Qty: 20 | Refills: 0 | Status: DISCONTINUED | COMMUNITY
Start: 2023-03-16 | End: 2023-10-16

## 2023-10-26 ENCOUNTER — LABORATORY RESULT (OUTPATIENT)
Age: 53
End: 2023-10-26

## 2023-11-15 NOTE — ED ADULT NURSE REASSESSMENT NOTE - SKIN TEMPERATURE
Pulmonary/Critical Care/Sleep Medicine Progress Note    Subjective:  Tomás is a 62 year old male  with known non-small cell cancer reported to be adenocarcinoma stage IIIb who initially had targeted therapy but apparently failed and in the last 2 months has been on chemotherapy.  Months ago he had a malignant right pleural effusion diagnosed and a tunneled Pleurx catheter would be placed in the right pleural space.  According to the patient and his wife, Anna he would drain initially 1400 mL then 900 mL while in the hospital but then after going home he would dropped to the 400s and then the 200s receiving drainage every other day.  Then he would just have weekly drainage of only approximately 0 to 25 mL often with clots.  And in the past 3 to 4 weeks his drainage has been negligent.  Last Sunday he had 25 mL drain and it was a light creamy brown and red color not usual cherry red.  He would come in to have the chest tube removed yesterday and apparently there was purulent drainage reported after removal of that tube by interventional radiology.  He was then sent to the emergency room and admitted for concern that he may have empyema.  He was started on Rocephin and Zithromax and changed to cefepime with Zithromax.  At approximately 11 PM last night after receiving antibiotics he said he felt warm and is at that time his temperature was 102.7.  He says that \"in short the removal of the catheter respond to all of this\"     Past Medical History  He says his cancer was diagnosed back in January of this year.  Initially he was reported to have stage IIIb cancer.  He quit smoking 32 years ago and smoked less than 10 pack years.  He notes that his sister a non-smoker also has lung cancer.  He is seeing Dr. Sun and apparently was initially on a targeted therapy but this was not successful so he has recently started on chemotherapy.  A few months ago he developed a malignant right pleural effusion.  He had a Pleurx  catheter placed approximately 2 months ago.  And as mentioned above initially drainage was profound but would gradually tailor off to negligible amounts in the past weeks.     11/5/23 no new sx. Coughing severely for months.No pleuritic chest pain.Poor appetite.    11/6/23 Dr Arroyo placed chest tube 14F today and drained 200 ml of pus.    11/7/23   feelling better and cough much better immediately after chest tube placed    Now has 230 ml w pus layering in Pleurevac   E had original 200 ml pus drained so now total drainage about 400 ml.    11/8/2023  Today patient is comfortable no pain breathing well cough much less.  His chest tube waterseal titles very well indicating a very well-functioning chest tube.  The drainage is tan-brownish opaque mostly.  He is only up to about 250 mL and a clear current Pleur-evac thus he is draining less than 25 mL none the last 24 hours.  Repeat chest x-ray does not show much clinical change.  May have to get a CT scan to see if there is evidence of residual fluid within.  Would also consider tPA/DNase 10 and 5 mg intrapleural should he have a remaining large locule.    11/9 no significant output from chest tube and will give patient tPA today, risk-benefit alternatives explained to the patient    11/10/23 70 cc came out overnight after tPA and dornase administration.  Place chest tube back on suction and get chest x-ray this morning.    11/11 CT in place, better over all    11/12 will repeat tPA and dornase alpha today    11/13 doing ok, small drainage from chest tube    11/14 CT removed    Medications  Current Facility-Administered Medications   Medication   • metroNIDAZOLE (FLAGYL) tablet 500 mg   • linezolid (ZYVOX) IVPB 600 mg   • cefTRIAXone (ROCEPHIN) syringe 2,000 mg   • HYDROcodone-acetaminophen (NORCO) 5-325 MG per tablet 1 tablet   • docusate sodium (COLACE) capsule 100 mg   • guaiFENesin-codeine (GUAIFENESIN AC) 100-10 MG/5ML liquid 5 mL   • albuterol (VENTOLIN)  nebulizer 2.5 mg   • nystatin (MYCOSTATIN) 715713 UNIT/ML suspension 500,000 Units   • fenofibrate micronized (LOFIBRA) capsule 134 mg   • folic acid (FOLATE) tablet 1 mg   • docusate sodium (COLACE) capsule 100 mg   • ondansetron (ZOFRAN ODT) disintegrating tablet 8 mg   • prochlorperazine (COMPAZINE) tablet 10 mg   • sodium chloride 0.9 % flush bag 25 mL   • sodium chloride 0.9 % injection 2 mL   • sodium chloride (NORMAL SALINE) 0.9 % bolus 500 mL   • enoxaparin (LOVENOX) injection 40 mg   • acetaminophen (TYLENOL) tablet 650 mg    Or   • acetaminophen (TYLENOL) suppository 650 mg   • polyethylene glycol (MIRALAX) packet 17 g   • melatonin tablet 3 mg   • Potassium Standard Replacement Protocol (Levels 3.5 and lower)   • Potassium Replacement (Levels 3.6 - 4)   • Magnesium Standard Replacement Protocol   • albuterol (VENTOLIN) nebulizer 2.5 mg   • mirtazapine (REMERON) tablet 15 mg       Allergies  ALLERGIES:  Penicillins      Review of Systems  None new     Physical Exam  Blood pressure 93/60, pulse 96, temperature 97.9 °F (36.6 °C), temperature source Oral, resp. rate 16, height 5' 11\" (1.803 m), weight 66.9 kg (147 lb 7.8 oz), SpO2 98 %.    Intake/Output Summary (Last 24 hours) at 11/14/2023 1852  Last data filed at 11/14/2023 1400  Gross per 24 hour   Intake --   Output 350 ml   Net -350 ml      Alert WD  More cheerful  Better  air R base    O2/Ventilator Settings:  FiO2 (%):  [21 %] 21 %       Relevant Results  No results found for this or any previous visit (from the past 24 hour(s)).      Imaging  XR CHEST AP OR PA   Final Result      1. As above.                              Electronically Signed by: SAMARA BABCOCK M.D.    Signed on: 11/14/2023 2:45 PM    Workstation ID: [id number]      CT CHEST WO CONTRAST   Final Result      Lack of IV contrast limits evaluation.      1.  Diffuse peribronchial wall thickening of the right lung airways with   multiple areas of airway narrowing, worse in the right  middle and right   lower lobes. There is complete atelectasis/consolidation of the right lower   lobe with air bronchograms. Significant subsegmental atelectasis of the   right middle lobe. Infectious and/or neoplastic process should be   considered.      2. There is scarring of the right upper lobe. There are scattered small 2   to 3 mm centrilobular opacities in the aerated portions of the right upper   lobe which could reflect an infectious process including aspiration   pneumonia. Mild intralobular septal thickening in the aerated right upper   lobe, likely representing interstitial edema.      3. There is an indwelling right pleural pigtail drainage catheter, position   is in the posterior inferior right hemithorax. There is a residual   complicated right pleural effusion versus empyema which could possibly be   cavitary with internal debris. Overall findings appear similar to the   11/9/2023 exam. There is similar volume mild to moderate right pleural   effusion.      4. There is overall volume loss of the right lung, similar to prior exam.    No obvious right pneumothorax.      5.  Left airways are clear. No focal opacities in the hyperexpanded left   lung. No left pneumothorax or left pleural effusion.      6.  No axillary lymphadenopathy by CT size criteria. Nonspecific   subcentimeter short axis mediastinal lymph nodes are present. Difficult to   exclude hilar lymphadenopathy on this noncontrast scan.      7. Osteopenia. Degenerative changes of the spine. No acute fractures or   dislocations of the visualized spine or thoracic cage. Multifocal sclerotic   lesions are seen throughout the visualized osseous structures, similar to   prior exam and possibly representing skeletal metastases.         See above for additional findings/observations.      Electronically Signed by: CANDICE AU DO    Signed on: 11/13/2023 11:36 AM    Workstation ID: [id number]      XR CHEST AP OR PA   Final Result   1. Right  warm basilar chest tube with persistent dense right basilar   consolidation and pleural fluid. Ipsilateral mediastinal shift probably   related to atelectasis of the right middle and lower lobes.   2. No pneumothorax.   3. No new focal infiltrate.            Electronically Signed by: ABHINAV NICK MD    Signed on: 11/10/2023 10:46 AM    Workstation ID: [id number]      CT CHEST WO CONTRAST   Final Result      1. Interval placement of right-sided chest tube within loculated empyema   posteriorly in the right hemithorax. Volume of right pleural fluid has   improved compared to prior study. Pleural air also present, likely related   to infection.      2. Volume loss of the right hemithorax with rightward cardiomediastinal   shift. Occlusion of the right middle and lower lobe bronchi. Significant   consolidation in the right middle lobe and right lower lobe likely a   combination of atelectasis or infiltrate.      3. Patchy consolidation inferiorly in the right upper lobe with tree-in-bud   opacities, compatible with bronchiolitis. Mild interlobular septal   thickening in the right upper lobe may also represent mild edema.      4. Tree-in-bud opacities in the left lower lobe, compatible with   bronchiolitis.      5. Cholelithiasis.      6. Additional findings as above.         Electronically Signed by: DANA EDWARDS M.D.    Signed on: 11/9/2023 8:48 AM    Workstation ID: [id number]      XR CHEST PA AND LATERAL 2 VIEWS   Final Result   1.  Right pleural effusion appears similar compared to the prior exam, with   new right pigtail chest tube.   2.  COPD.   3.  Opacification at the right mid to lower lung zone from atelectasis or   infection appears similar compared to the prior exam.   4.  Mild perihilar interstitial edema.      Electronically Signed by: KELI SHEA M.D.    Signed on: 11/7/2023 4:51 PM    Workstation ID: [id number]      IR PLEURAL DRAINAGE INDWELLING CATHETER   Final Result   Successful  sonographic and fluoroscopic guided right chest tube insertion   for empyema (14 [place]).       Electronically Signed by: ABHINAV NICK MD    Signed on: 11/6/2023 1:13 PM    Workstation ID: [id number]      CT CHEST W CONTRAST   Final Result      1. Large gas-filled fluid collection in the right posterior hemithorax.   Differential considerations would include infectious etiology/empyema.   2. Consolidation of the right lung which could be due to atelectasis,   infection, and/or mass. Right pleural effusion.   3. Interstitial and nodular opacification within the aerated portions of   right lung for which differential considerations would include lymphangitic   carcinomatosis, edema, and/or infection.   4. Innumerable sclerotic bony metastatic disease, present previously.   5. Additional findings as described above.      Electronically Signed by: FARIHA TRUJILLO M.D.    Signed on: 11/4/2023 8:37 PM    Workstation ID: [id number]      XR CHEST PA AND LATERAL 2 VIEWS   Final Result      As above.      Electronically Signed by: FARIHA TRUJILLO M.D.    Signed on: 11/3/2023 8:18 PM    Workstation ID: [id number]      IR PLEURAL CATHETER REMOVAL   Final Result      1. Successful removal of a PleurX catheter from the right pleural space.   2. Purulent drainage was noted from the catheter exit site after the   catheter was removed. This was discussed with the pulmonology nurse Pina   from Dr. Camara's office. It was their recommendation that the patient go   to the emergency room. This was discussed with the patient and he was in   agreement to plan.       Electronically Signed by: REI TUCKER M.D.    Signed on: 11/3/2023 4:13 PM    Workstation ID: [id number]            Assessment/Plan  Empyema thoracis associated with indwelling Pleurx catheter  Malignant (adenocarcinoma) right pleural effusion  Non-small cell  lung cancer initially T2 N2 M0 stage IIIb (archival report) with large right lower lobe mass status  post targeted therapy but progressed to stage IV now on chemotherapy  Anemia  History of asthma  Obstructive sleep apnea on CPAP 12     Recommendations:  Broad-spectrum antibiotic coverage per ID, change to PO   lovenox 40  CTube removed as there was minimal drainage  CXR in am 2 view  Discussed with nursing and patient in detail      Code Status    Code Status: Full Resuscitation      11/14/2023

## 2023-12-05 ENCOUNTER — RX RENEWAL (OUTPATIENT)
Age: 53
End: 2023-12-05

## 2023-12-05 RX ORDER — SACUBITRIL AND VALSARTAN 49; 51 MG/1; MG/1
49-51 TABLET, FILM COATED ORAL
Qty: 60 | Refills: 5 | Status: ACTIVE | COMMUNITY
Start: 2022-05-23 | End: 1900-01-01

## 2023-12-07 NOTE — PATIENT PROFILE ADULT - INTERNATIONAL TRAVEL
12/06/23 1805   Hemodialysis Double Lumen Catheter 09/05/23 Right Jugular   Placement Date/Time: 09/05/23 1539   Inserted By: Dr. Whitehead  Laterality: Right  Location: Jugular  Prep: Chlorhexidine gluconate (CHG);Local anesthetic - injectable  Insertion Type: Tunneled  Securement: [place] device;Sutures;Transparent dressing   Port 1 Lumen Cap Applied/Changed On 12/06/23   Port 1 Line Status Capped;Line flushed  (heparin lock)   Port 2 Lumen Cap Applied/Changed On 12/06/23   Port 2 Line Status Capped;Line flushed  (heparin lock)   During Hemodialysis   Ultrafiltration Removed (mL) 500 mL   Access Complications None   Arteriovenous Lines Secure/Visible Yes   During Dialysis Comments Treatment completed   During Dialysis Interventions Blood returned   Vital Signs   Temp 97 °F (36.1 °C)   Temp src Temporal   Heart Rate 92   Heart Rate Source Monitor   /72   Calculated MAP (mmHg) 84 mm Hg   BP Location RUE - Right upper extremity   BP Method Automatic   Patient Position Semi-Peres's   Resp 16   Post-Hemodialysis   Rinseback Volume (mL) 200 ml   Blood Volume Processed (Liters) 41 Liters   Dialyzer Clearance Lightly streaked   Duration of Treatment (Hours) 2 hours   Catheter Capping Heparin   Net Fluid Removed Without Blood 0 mL   Patient Hep B Positive? No   Post-treatment report pt completed and tolerated 2hrs of HD tx w/o complications. net uf OL. Blood returned, hep locked, capped and wrapped in clean dry dressing. Pt returned to his room in stable condition.   Post Dialysis Status Fluid removal goal met;Patient tolerated treatment without complication;Treatment time goal met        No

## 2024-01-15 ENCOUNTER — RX RENEWAL (OUTPATIENT)
Age: 54
End: 2024-01-15

## 2024-01-15 RX ORDER — EMPAGLIFLOZIN 10 MG/1
10 TABLET, FILM COATED ORAL DAILY
Qty: 90 | Refills: 2 | Status: ACTIVE | COMMUNITY
Start: 2022-09-20 | End: 1900-01-01

## 2024-01-22 ENCOUNTER — APPOINTMENT (OUTPATIENT)
Dept: CARDIOLOGY | Facility: CLINIC | Age: 54
End: 2024-01-22
Payer: MEDICAID

## 2024-01-22 ENCOUNTER — NON-APPOINTMENT (OUTPATIENT)
Age: 54
End: 2024-01-22

## 2024-01-22 VITALS
HEART RATE: 73 BPM | BODY MASS INDEX: 34.72 KG/M2 | SYSTOLIC BLOOD PRESSURE: 128 MMHG | OXYGEN SATURATION: 99 % | HEIGHT: 71 IN | DIASTOLIC BLOOD PRESSURE: 87 MMHG | WEIGHT: 248 LBS

## 2024-01-22 PROCEDURE — 99214 OFFICE O/P EST MOD 30 MIN: CPT | Mod: 25

## 2024-01-22 PROCEDURE — 93000 ELECTROCARDIOGRAM COMPLETE: CPT

## 2024-01-22 RX ORDER — CARVEDILOL 25 MG/1
25 TABLET, FILM COATED ORAL TWICE DAILY
Qty: 60 | Refills: 5 | Status: DISCONTINUED | COMMUNITY
Start: 2022-05-20 | End: 2024-01-22

## 2024-01-22 NOTE — DISCUSSION/SUMMARY
[FreeTextEntry1] : In 2022, Tim presented with shortness of breath, and was found to have a severe nonischemic cardiomyopathy.  He is feeling better ok today and appears close to euvolemic on exam. His blood pressure is acceptable.   His physical exam is unremarkable.  His EKG continues to demonstrate a sinus rhythm, with a nonspecific ST abnormality, and evidence of left ventricular hypertrophy.  He will continue his current regimen of Coreg, Hydralazine, Entresto, and Jardiance.  He has not been able to tolerate spironolactone, though will continue Lasix. He needs to be more compliant with his medications. We are going to repeat an echocardiogram this year.  He will continue lifestyle modifications such as diet and exercise for optimal cardiovascular benefits. He promises to start exercising. He will be compliant with his CPAP.  He will follow up in 4-6 months, sooner if questions or concerns arise [EKG obtained to assist in diagnosis and management of assessed problem(s)] : EKG obtained to assist in diagnosis and management of assessed problem(s)
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
## 2024-01-22 NOTE — CARDIOLOGY SUMMARY
[de-identified] : 5/20/22: sr, nsst abn, lvh\par  8/16/22:\par  SR, LVH\par  \par  9/20/22\par  SR/LVH [de-identified] : 7/2022\par  EF 29%\par  severe global LV dysfunction- Mod LV enlargement\par  mild diastolic stage 1 [de-identified] : 4/2022\par  NON obstructive \par

## 2024-01-22 NOTE — PHYSICAL EXAM
[Well Nourished] : well nourished [Well Developed] : well developed [No Acute Distress] : no acute distress [Normal Conjunctiva] : normal conjunctiva [Normal Venous Pressure] : normal venous pressure [No Carotid Bruit] : no carotid bruit [Normal S1, S2] : normal S1, S2 [No Murmur] : no murmur [No Rub] : no rub [No Gallop] : no gallop [Clear Lung Fields] : clear lung fields [Good Air Entry] : good air entry [No Respiratory Distress] : no respiratory distress  [Soft] : abdomen soft [Non Tender] : non-tender [No Masses/organomegaly] : no masses/organomegaly [Normal Bowel Sounds] : normal bowel sounds [No Edema] : no edema [Normal Gait] : normal gait [No Cyanosis] : no cyanosis [No Clubbing] : no clubbing [No Varicosities] : no varicosities [No Rash] : no rash [No Skin Lesions] : no skin lesions [Moves all extremities] : moves all extremities [No Focal Deficits] : no focal deficits [Normal Speech] : normal speech [Alert and Oriented] : alert and oriented [Normal memory] : normal memory

## 2024-02-28 NOTE — ED ADULT NURSE NOTE - CAS EDN DISCHARGE ASSESSMENT
[No Acute Distress] : no acute distress [III] : Mallampati Class: III [Normal Appearance] : normal appearance [No Neck Mass] : no neck mass [Normal Rate/Rhythm] : normal rate/rhythm [Normal S1, S2] : normal s1, s2 [No Murmurs] : no murmurs [No Resp Distress] : no resp distress [Clear to Auscultation Bilaterally] : clear to auscultation bilaterally [No Abnormalities] : no abnormalities [Benign] : benign [Normal Gait] : normal gait [No Clubbing] : no clubbing [No Cyanosis] : no cyanosis [No Edema] : no edema [FROM] : FROM [Normal Color/ Pigmentation] : normal color/ pigmentation [No Focal Deficits] : no focal deficits [Oriented x3] : oriented x3 [Normal Affect] : normal affect Alert and oriented to person, place and time

## 2024-03-13 ENCOUNTER — RX RENEWAL (OUTPATIENT)
Age: 54
End: 2024-03-13

## 2024-04-15 ENCOUNTER — APPOINTMENT (OUTPATIENT)
Dept: PULMONOLOGY | Facility: CLINIC | Age: 54
End: 2024-04-15
Payer: MEDICAID

## 2024-04-15 VITALS
HEART RATE: 73 BPM | WEIGHT: 253 LBS | SYSTOLIC BLOOD PRESSURE: 140 MMHG | OXYGEN SATURATION: 96 % | BODY MASS INDEX: 35.42 KG/M2 | TEMPERATURE: 97.3 F | RESPIRATION RATE: 16 BRPM | DIASTOLIC BLOOD PRESSURE: 80 MMHG | HEIGHT: 71 IN

## 2024-04-15 DIAGNOSIS — R06.02 SHORTNESS OF BREATH: ICD-10-CM

## 2024-04-15 DIAGNOSIS — G47.31 PRIMARY CENTRAL SLEEP APNEA: ICD-10-CM

## 2024-04-15 DIAGNOSIS — R93.89 ABNORMAL FINDINGS ON DIAGNOSTIC IMAGING OF OTHER SPECIFIED BODY STRUCTURES: ICD-10-CM

## 2024-04-15 DIAGNOSIS — Z22.7 LATENT TUBERCULOSIS: ICD-10-CM

## 2024-04-15 DIAGNOSIS — J98.6 DISORDERS OF DIAPHRAGM: ICD-10-CM

## 2024-04-15 PROCEDURE — 94727 GAS DIL/WSHOT DETER LNG VOL: CPT

## 2024-04-15 PROCEDURE — 95012 NITRIC OXIDE EXP GAS DETER: CPT

## 2024-04-15 PROCEDURE — 99214 OFFICE O/P EST MOD 30 MIN: CPT | Mod: 25

## 2024-04-15 PROCEDURE — 94010 BREATHING CAPACITY TEST: CPT

## 2024-04-15 PROCEDURE — ZZZZZ: CPT

## 2024-04-15 PROCEDURE — 94729 DIFFUSING CAPACITY: CPT

## 2024-04-15 NOTE — HISTORY OF PRESENT ILLNESS
[TextBox_4] : Mr. VILLAVICENCIO is a 53 year old male with a history of decreased libido, depression, right sided empyema, gout, hypothyroid, JULIAN, former smoker, presenting to the office today for a f/u pulmonary evaluation. His chief complaint is  - he notes feeling generally well - he notes exercising (cycling and weights) - vision is stable - he notes recently developing a cough which he thinks is allergy related - he notes work is very busy - he notes his heart work up with Dr. Aguilar is stable - he notes sleeping well with CPAP but sometimes does not get enough due to work - he notes his biggest complaint is fatigue due to sleep quantity - he notes he has a echocardiogram scheduled - he notes getting all of his supplies from [facility]  -he denies any headaches, nausea, emesis, fever, chills, sweats, chest pain, chest pressure, wheezing, palpitations, constipation, diarrhea, vertigo, dysphagia, heartburn, reflux, itchy eyes, itchy ears, leg swelling, leg pain, arthralgias, myalgias, hoarseness, or sour taste in the mouth.

## 2024-04-15 NOTE — ASSESSMENT
[FreeTextEntry1] : Mr. VILLAVICENCIO is a 53 year old male with a history of decreased libido, depression, right sided empyema, gout, hypothyroid, JULIAN, former smoker, presenting to the office today for a follow up pulmonary evaluation. His chief complaint is sob/ c/w CHF, right pleural effusion from 2020/ untreated JULIAN (on Dx) -improved (exercising) - improved - mixed sleep apnea- now treated  His shortness of breath is multifactorial due to: -poor mechanics of breathing -out of shape / overweight -pulmonary disease  -RADs -?cardiac disease ([place])  problem 1: CHF (9/2023) -Consult  -Complete BNP q 3 months, Echo 6/2023  problem 2: Right sided Pleural Effusion from 2020- resolved -repeat Xray- resolved consistent with pleural thickening (blunt Rcp Angle)  Problem 3 abnormal PFT c/w prior restrictive dysfunction -improved on PFTs  Problem 3A: Diaphragm Dysfunction (WNL) -s/p complete fluoroscopy of the diaphragm- 1/2023 (WNL)  problem 4: JULIAN (Mallampati class, neck size, fatigue, snoring) - (mixed central. obstructive) -using the machine and benefitting very well -s/p home sleep study- -SS, APAP machine with back up rate 10/hour in place ([facility]) Sleep apnea is associated with adverse clinical consequences which can affect most organ systems. Cardiovascular disease risk includes arrhythmias, atrial fibrillation, hypertension, coronary artery disease, and stroke. Metabolic disorders include diabetes type 2, non-alcoholic fatty liver disease. Mood disorder especially depression; and cognitive decline especially in the elderly. Associations with chronic reflux/Mcmillan's esophagus some but not all inclusive. -Reasons include arousal consistent with hypopnea; respiratory events most prominent in REM sleep or supine position; therefore sleep staging and body position are important for accurate diagnosis and estimation of AHI.  problem 5: cardiac disease -recommended to continue to follow up with Cardiologist (Lauren)  problem 6: poor breathing mechanics -recommended Corina Godinez and Laury breathing techniques -recommended internet exercise platforms: [employer] and Aerobic exercise for seniors -Proper breathing techniques were reviewed with an emphasis of exhalation. Patient instructed to breath in for 1 second and out for four seconds. Patient was encouraged to not talk while walking.  problem 7: out of shape / overweight -Recommended Juvencio Mclaughlin's 10-day detox diet and book. -recommended Berberine Synergy OTC -Weight loss, exercise, and diet control were discussed and are highly encouraged. Treatment options were given such as, aqua therapy, and contacting a nutritionist. Recommended to use the elliptical, stationary bike, less use of treadmill. Mindful eating was explained to the patient Obesity is associated with worsening asthma, shortness of breath, and potential for cardiac disease, diabetes, and other underlying medical conditions  problem 8: health maintenance -recommended Sanotize anti viral nasal spray in case of viral infection -s/p Covid 19 vaccine x3 -recommended yearly flu shot after October 15 (2023) -recommended strep pneumonia vaccines: Prevnar-13 vaccine, followed by Pneumo vaccine 23 one year following after 65 -recommended early intervention for Upper Respiratory Infections (URIs) -recommended regular osteoporosis evaluations -recommended early dermatological evaluations -recommended after the age of 50 to the age of 70, colonoscopy every 5 years  F/U in 6 months He is encouraged to call with any changes, concerns, or questions

## 2024-04-15 NOTE — PROCEDURE
[FreeTextEntry1] : Full PFT reveals moderate restrictive and mild obstructive dysfunction; FEV1 was 2.08 L which is 54% of predicted with no change after using a bronchodilator; moderately reduced lung volumes; low normal diffusion at 21.1, which is 70% of predicted; Abnormal inspiratory limb. PFTs were performed to evaluate for SOB  FENO was 8; a normal value being less than 25 Fractional exhaled nitric oxide (FENO) is regarded as a simple, noninvasive method for assessing eosinophilic airway inflammation. Produced by a variety of cells within the lung, nitric oxide (NO) concentrations are generally low in healthy individuals. However, high concentrations of NO appear to be involved in nonspecific host defense mechanisms and chronic inflammatory diseases such as asthma. The American Thor[employer] Society (ATS) therefore has strongly recommended using FENO to aid in the assessment, management, and long-term monitoring of eosinophilic airway inflammation and asthma, and for identifying steroid responsive individuals whose chronic respiratory symptoms may be caused by airway inflammation. In their 2011 clinical practice guideline, the ATS emphasizes the importance of using FENO.

## 2024-04-15 NOTE — PHYSICAL EXAM
[No Acute Distress] : no acute distress [Normal Oropharynx] : normal oropharynx [III] : Mallampati Class: III [Normal Appearance] : normal appearance [No Neck Mass] : no neck mass [Normal Rate/Rhythm] : normal rate/rhythm [Normal S1, S2] : normal s1, s2 [No Murmurs] : no murmurs [No Resp Distress] : no resp distress [Clear to Auscultation Bilaterally] : clear to auscultation bilaterally [Benign] : benign [No Abnormalities] : no abnormalities [Normal Gait] : normal gait [No Clubbing] : no clubbing [No Cyanosis] : no cyanosis [No Edema] : no edema [FROM] : FROM [Normal Color/ Pigmentation] : normal color/ pigmentation [No Focal Deficits] : no focal deficits [Oriented x3] : oriented x3 [Normal Affect] : normal affect [TextBox_2] : OW [TextBox_68] : I:E ratio 1:3; clear

## 2024-04-15 NOTE — ADDENDUM
[FreeTextEntry1] : Documented by Richy Ya acting as a scribe for Dr. Brendan Murphy on 04/15/2024.   All medical record entries made by the Scribe were at my, Dr. Brendan Murphy's, direction and personally dictated by me on 04/15/2024. I have reviewed the chart and agree that the record accurately reflects my personal performance of the history, physical exam, assessment and plan. I have also personally directed, reviewed, and agree with the discharge instructions.

## 2024-04-17 DIAGNOSIS — Z12.11 ENCOUNTER FOR SCREENING FOR MALIGNANT NEOPLASM OF COLON: ICD-10-CM

## 2024-05-06 ENCOUNTER — APPOINTMENT (OUTPATIENT)
Dept: CARDIOLOGY | Facility: CLINIC | Age: 54
End: 2024-05-06

## 2024-05-13 ENCOUNTER — RX RENEWAL (OUTPATIENT)
Age: 54
End: 2024-05-13

## 2024-05-13 RX ORDER — HYDRALAZINE HYDROCHLORIDE 25 MG/1
25 TABLET ORAL
Qty: 180 | Refills: 3 | Status: ACTIVE | COMMUNITY
Start: 2022-05-20 | End: 1900-01-01

## 2024-05-15 ENCOUNTER — RX RENEWAL (OUTPATIENT)
Age: 54
End: 2024-05-15

## 2024-05-15 RX ORDER — LEVOTHYROXINE SODIUM 0.05 MG/1
50 TABLET ORAL
Qty: 30 | Refills: 1 | Status: ACTIVE | COMMUNITY
Start: 2021-08-25 | End: 1900-01-01

## 2024-05-19 ENCOUNTER — RX RENEWAL (OUTPATIENT)
Age: 54
End: 2024-05-19

## 2024-05-19 RX ORDER — CARVEDILOL 6.25 MG/1
6.25 TABLET, FILM COATED ORAL
Qty: 180 | Refills: 3 | Status: ACTIVE | COMMUNITY
Start: 2022-11-15 | End: 1900-01-01

## 2024-05-20 ENCOUNTER — APPOINTMENT (OUTPATIENT)
Dept: CARDIOLOGY | Facility: CLINIC | Age: 54
End: 2024-05-20
Payer: MEDICAID

## 2024-05-20 ENCOUNTER — NON-APPOINTMENT (OUTPATIENT)
Age: 54
End: 2024-05-20

## 2024-05-20 VITALS
WEIGHT: 250 LBS | HEIGHT: 71 IN | HEART RATE: 86 BPM | DIASTOLIC BLOOD PRESSURE: 89 MMHG | BODY MASS INDEX: 35 KG/M2 | SYSTOLIC BLOOD PRESSURE: 150 MMHG

## 2024-05-20 VITALS — DIASTOLIC BLOOD PRESSURE: 82 MMHG | SYSTOLIC BLOOD PRESSURE: 132 MMHG

## 2024-05-20 DIAGNOSIS — R06.00 DYSPNEA, UNSPECIFIED: ICD-10-CM

## 2024-05-20 PROCEDURE — 93000 ELECTROCARDIOGRAM COMPLETE: CPT

## 2024-05-20 PROCEDURE — 99214 OFFICE O/P EST MOD 30 MIN: CPT | Mod: 25

## 2024-05-20 NOTE — DISCUSSION/SUMMARY
[FreeTextEntry1] : In 2022, Tim presented with shortness of breath, and was found to have a severe nonischemic cardiomyopathy.  He is feeling better ok today and appears close to euvolemic on exam. His blood pressure is acceptable.   His physical exam is unremarkable.  His EKG continues to demonstrate a sinus rhythm, with a nonspecific ST abnormality, and evidence of left ventricular hypertrophy.  He will continue his current regimen of Coreg, Hydralazine, Entresto, and Jardiance.  He has not been able to tolerate spironolactone, though will continue Lasix. He needs to be more compliant with his medications. He missed his appt for an echocardiogram.  He will continue lifestyle modifications such as diet and exercise for optimal cardiovascular benefits. He promises to start exercising. He will be compliant with his CPAP (he is only compliant 30% of the time).  He will follow up in 4-6 months. [EKG obtained to assist in diagnosis and management of assessed problem(s)] : EKG obtained to assist in diagnosis and management of assessed problem(s)

## 2024-05-20 NOTE — CARDIOLOGY SUMMARY
[de-identified] : 5/20/22: sr, nsst abn, lvh\par  8/16/22:\par  SR, LVH\par  \par  9/20/22\par  SR/LVH [de-identified] : 7/2022\par  EF 29%\par  severe global LV dysfunction- Mod LV enlargement\par  mild diastolic stage 1 [de-identified] : 4/2022\par  NON obstructive \par
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
## 2024-06-17 RX ORDER — ALLOPURINOL 100 MG/1
100 TABLET ORAL
Qty: 30 | Refills: 0 | Status: ACTIVE | COMMUNITY
Start: 2022-07-13 | End: 1900-01-01

## 2024-06-20 DIAGNOSIS — I50.20 UNSPECIFIED SYSTOLIC (CONGESTIVE) HEART FAILURE: ICD-10-CM

## 2024-06-21 ENCOUNTER — APPOINTMENT (OUTPATIENT)
Dept: INTERNAL MEDICINE | Facility: CLINIC | Age: 54
End: 2024-06-21
Payer: MEDICAID

## 2024-06-21 ENCOUNTER — LABORATORY RESULT (OUTPATIENT)
Age: 54
End: 2024-06-21

## 2024-06-21 VITALS
WEIGHT: 250 LBS | BODY MASS INDEX: 35 KG/M2 | SYSTOLIC BLOOD PRESSURE: 114 MMHG | TEMPERATURE: 98.5 F | HEIGHT: 71 IN | RESPIRATION RATE: 14 BRPM | HEART RATE: 79 BPM | DIASTOLIC BLOOD PRESSURE: 80 MMHG | OXYGEN SATURATION: 98 %

## 2024-06-21 DIAGNOSIS — I42.9 CARDIOMYOPATHY, UNSPECIFIED: ICD-10-CM

## 2024-06-21 DIAGNOSIS — Z00.00 ENCOUNTER FOR GENERAL ADULT MEDICAL EXAMINATION W/OUT ABNORMAL FINDINGS: ICD-10-CM

## 2024-06-21 DIAGNOSIS — J98.4 OTHER DISORDERS OF LUNG: ICD-10-CM

## 2024-06-21 DIAGNOSIS — E03.9 HYPOTHYROIDISM, UNSPECIFIED: ICD-10-CM

## 2024-06-21 DIAGNOSIS — I10 ESSENTIAL (PRIMARY) HYPERTENSION: ICD-10-CM

## 2024-06-21 PROCEDURE — 99396 PREV VISIT EST AGE 40-64: CPT

## 2024-06-21 NOTE — PLAN
[FreeTextEntry1] : Check labs Cologuard test for colorectal cancer screening Follow up with Cardiology and Pulmonary continue present meds

## 2024-06-21 NOTE — HEALTH RISK ASSESSMENT
[Good] : ~his/her~  mood as  good [Yes] : Yes [de-identified] : social [Former] : Former [0-4] : 0-4 [> 15 Years] : > 15 Years

## 2024-06-21 NOTE — HISTORY OF PRESENT ILLNESS
[FreeTextEntry1] : Here for CPE. Overall feeling well Had recent follow=up with Dr. Aguilar, Cardiology [de-identified] : Former smoker. Quit 35 years ago. Smoked a few cigarettes daily for 2-3 years, Social alcohol. Will do a Cologuard test for colorectal cancer screening  Doesn't exercise much but is physically active at work.

## 2024-07-02 DIAGNOSIS — E11.9 TYPE 2 DIABETES MELLITUS W/OUT COMPLICATIONS: ICD-10-CM

## 2024-07-02 LAB
ALBUMIN SERPL ELPH-MCNC: 4.8 G/DL
ALP BLD-CCNC: 51 U/L
ALT SERPL-CCNC: 25 U/L
ANION GAP SERPL CALC-SCNC: 14 MMOL/L
APPEARANCE: CLEAR
AST SERPL-CCNC: 27 U/L
BILIRUB SERPL-MCNC: 0.3 MG/DL
BILIRUBIN URINE: NEGATIVE
BLOOD URINE: NEGATIVE
BUN SERPL-MCNC: 17 MG/DL
CALCIUM SERPL-MCNC: 9.2 MG/DL
CHLORIDE SERPL-SCNC: 100 MMOL/L
CHOLEST SERPL-MCNC: 149 MG/DL
CO2 SERPL-SCNC: 24 MMOL/L
COLOR: YELLOW
CREAT SERPL-MCNC: 1.18 MG/DL
EGFR: 73 ML/MIN/1.73M2
ESTIMATED AVERAGE GLUCOSE: 146 MG/DL
FOLATE SERPL-MCNC: 13.4 NG/ML
GLUCOSE QUALITATIVE U: >=1000 MG/DL
GLUCOSE SERPL-MCNC: 97 MG/DL
HBA1C MFR BLD HPLC: 6.7 %
HCT VFR BLD CALC: 48.3 %
HDLC SERPL-MCNC: 34 MG/DL
HGB BLD-MCNC: 15.2 G/DL
KETONES URINE: NEGATIVE MG/DL
LDLC SERPL CALC-MCNC: 99 MG/DL
LEUKOCYTE ESTERASE URINE: NEGATIVE
MCHC RBC-ENTMCNC: 28 PG
MCHC RBC-ENTMCNC: 31.5 GM/DL
MCV RBC AUTO: 89 FL
NITRITE URINE: NEGATIVE
NONHDLC SERPL-MCNC: 115 MG/DL
PH URINE: 6
PLATELET # BLD AUTO: 197 K/UL
POTASSIUM SERPL-SCNC: 5.4 MMOL/L
PROT SERPL-MCNC: 7.8 G/DL
PROTEIN URINE: NEGATIVE MG/DL
PSA SERPL-MCNC: 0.96 NG/ML
RBC # BLD: 5.43 M/UL
RBC # FLD: 13.3 %
SODIUM SERPL-SCNC: 138 MMOL/L
SPECIFIC GRAVITY URINE: 1.02
TRIGL SERPL-MCNC: 80 MG/DL
TSH SERPL-ACNC: 4.3 UIU/ML
UROBILINOGEN URINE: 0.2 MG/DL
VIT B12 SERPL-MCNC: 792 PG/ML
WBC # FLD AUTO: 5.38 K/UL

## 2024-07-02 RX ORDER — ATORVASTATIN CALCIUM 20 MG/1
20 TABLET, FILM COATED ORAL
Qty: 1 | Refills: 1 | Status: ACTIVE | COMMUNITY
Start: 2024-07-02 | End: 1900-01-01

## 2024-09-09 ENCOUNTER — APPOINTMENT (OUTPATIENT)
Dept: PULMONOLOGY | Facility: CLINIC | Age: 54
End: 2024-09-09

## 2024-09-30 NOTE — ED PROVIDER NOTE - PRINCIPAL DIAGNOSIS
September 30, 2024      Pavithra Raines  [address]  [facility] [address]        To whom it may concern,     Shanika is a patient under my care. She has multiple health issues and would benefit from a service animal to assist her in care. She is at high risk of falling, and due to poor nutritional status and gastrointestinal issues, I recommend she be allowed to have her service dog to help keep her safer in her home.           Sincerely,          Soha Boggs MD           Epididymo-orchitis, acute

## 2024-10-21 ENCOUNTER — APPOINTMENT (OUTPATIENT)
Dept: CARDIOLOGY | Facility: CLINIC | Age: 54
End: 2024-10-21

## 2024-10-25 LAB
HBV SURFACE AB SERPL IA-ACNC: 116.5 MIU/ML
MEV IGG FLD QL IA: >300 AU/ML
MEV IGG+IGM SER-IMP: POSITIVE
MUV AB SER-ACNC: NEGATIVE
MUV IGG SER QL IA: 5.19 AU/ML
RUBV IGG FLD-ACNC: 4.76 INDEX
RUBV IGG SER-IMP: POSITIVE
VZV AB TITR SER: NEGATIVE
VZV IGG SER IF-ACNC: 0.53 S/CO

## 2024-10-26 LAB
M TB IFN-G BLD-IMP: NEGATIVE
QUANTIFERON TB PLUS MITOGEN MINUS NIL: >10 IU/ML
QUANTIFERON TB PLUS NIL: 0.02 IU/ML
QUANTIFERON TB PLUS TB1 MINUS NIL: 0 IU/ML
QUANTIFERON TB PLUS TB2 MINUS NIL: 0 IU/ML

## 2024-10-30 DIAGNOSIS — Z23 ENCOUNTER FOR IMMUNIZATION: ICD-10-CM

## 2024-10-31 ENCOUNTER — APPOINTMENT (OUTPATIENT)
Dept: INTERNAL MEDICINE | Facility: CLINIC | Age: 54
End: 2024-10-31
Payer: MEDICAID

## 2024-10-31 PROCEDURE — 90471 IMMUNIZATION ADMIN: CPT

## 2024-10-31 PROCEDURE — 90716 VAR VACCINE LIVE SUBQ: CPT

## 2024-10-31 PROCEDURE — 90707 MMR VACCINE SC: CPT

## 2024-10-31 PROCEDURE — 90472 IMMUNIZATION ADMIN EACH ADD: CPT

## 2025-02-20 ENCOUNTER — APPOINTMENT (OUTPATIENT)
Dept: PULMONOLOGY | Facility: CLINIC | Age: 55
End: 2025-02-20
Payer: SELF-PAY

## 2025-02-20 VITALS
HEART RATE: 72 BPM | RESPIRATION RATE: 14 BRPM | OXYGEN SATURATION: 95 % | DIASTOLIC BLOOD PRESSURE: 70 MMHG | BODY MASS INDEX: 35 KG/M2 | SYSTOLIC BLOOD PRESSURE: 140 MMHG | HEIGHT: 71 IN | TEMPERATURE: 97.52 F | WEIGHT: 250 LBS

## 2025-02-20 DIAGNOSIS — G47.33 OBSTRUCTIVE SLEEP APNEA (ADULT) (PEDIATRIC): ICD-10-CM

## 2025-02-20 DIAGNOSIS — R93.89 ABNORMAL FINDINGS ON DIAGNOSTIC IMAGING OF OTHER SPECIFIED BODY STRUCTURES: ICD-10-CM

## 2025-02-20 DIAGNOSIS — R94.2 ABNORMAL RESULTS OF PULMONARY FUNCTION STUDIES: ICD-10-CM

## 2025-02-20 DIAGNOSIS — R06.00 DYSPNEA, UNSPECIFIED: ICD-10-CM

## 2025-02-20 DIAGNOSIS — G47.31 PRIMARY CENTRAL SLEEP APNEA: ICD-10-CM

## 2025-02-20 PROCEDURE — 71046 X-RAY EXAM CHEST 2 VIEWS: CPT

## 2025-02-20 PROCEDURE — 99213 OFFICE O/P EST LOW 20 MIN: CPT

## 2025-02-20 RX ORDER — FLUTICASONE FUROATE, UMECLIDINIUM BROMIDE AND VILANTEROL TRIFENATATE 100; 62.5; 25 UG/1; UG/1; UG/1
100-62.5-25 POWDER RESPIRATORY (INHALATION)
Qty: 1 | Refills: 2 | Status: ACTIVE | COMMUNITY
Start: 2025-02-20 | End: 1900-01-01

## 2025-02-20 RX ORDER — LEVALBUTEROL HYDROCHLORIDE 0.63 MG/3ML
0.63 SOLUTION RESPIRATORY (INHALATION) EVERY 6 HOURS
Qty: 1 | Refills: 2 | Status: ACTIVE | COMMUNITY
Start: 2025-02-20 | End: 1900-01-01

## 2025-03-20 ENCOUNTER — APPOINTMENT (OUTPATIENT)
Dept: PULMONOLOGY | Facility: CLINIC | Age: 55
End: 2025-03-20
Payer: SELF-PAY

## 2025-03-20 ENCOUNTER — NON-APPOINTMENT (OUTPATIENT)
Age: 55
End: 2025-03-20

## 2025-03-20 VITALS
TEMPERATURE: 97.1 F | SYSTOLIC BLOOD PRESSURE: 138 MMHG | RESPIRATION RATE: 17 BRPM | HEART RATE: 91 BPM | DIASTOLIC BLOOD PRESSURE: 110 MMHG | HEIGHT: 71 IN | WEIGHT: 232 LBS | OXYGEN SATURATION: 96 % | BODY MASS INDEX: 32.48 KG/M2

## 2025-03-20 PROCEDURE — ZZZZZ: CPT

## 2025-03-21 ENCOUNTER — APPOINTMENT (OUTPATIENT)
Dept: PULMONOLOGY | Facility: CLINIC | Age: 55
End: 2025-03-21
Payer: SELF-PAY

## 2025-03-21 VITALS
HEART RATE: 91 BPM | RESPIRATION RATE: 17 BRPM | BODY MASS INDEX: 32.62 KG/M2 | WEIGHT: 233 LBS | SYSTOLIC BLOOD PRESSURE: 124 MMHG | HEIGHT: 71 IN | OXYGEN SATURATION: 97 % | TEMPERATURE: 95.5 F | DIASTOLIC BLOOD PRESSURE: 82 MMHG

## 2025-03-21 DIAGNOSIS — R06.02 SHORTNESS OF BREATH: ICD-10-CM

## 2025-03-21 DIAGNOSIS — J98.6 DISORDERS OF DIAPHRAGM: ICD-10-CM

## 2025-03-21 DIAGNOSIS — G47.33 OBSTRUCTIVE SLEEP APNEA (ADULT) (PEDIATRIC): ICD-10-CM

## 2025-03-21 DIAGNOSIS — R93.89 ABNORMAL FINDINGS ON DIAGNOSTIC IMAGING OF OTHER SPECIFIED BODY STRUCTURES: ICD-10-CM

## 2025-03-21 DIAGNOSIS — R94.2 ABNORMAL RESULTS OF PULMONARY FUNCTION STUDIES: ICD-10-CM

## 2025-03-21 PROCEDURE — 99214 OFFICE O/P EST MOD 30 MIN: CPT | Mod: 25

## 2025-03-21 PROCEDURE — 94010 BREATHING CAPACITY TEST: CPT

## 2025-03-21 PROCEDURE — 94727 GAS DIL/WSHOT DETER LNG VOL: CPT

## 2025-03-21 PROCEDURE — 94729 DIFFUSING CAPACITY: CPT

## 2025-06-23 ENCOUNTER — INPATIENT (INPATIENT)
Facility: HOSPITAL | Age: 55
LOS: 4 days | Discharge: HOME | DRG: 291 | End: 2025-06-28
Attending: STUDENT IN AN ORGANIZED HEALTH CARE EDUCATION/TRAINING PROGRAM | Admitting: STUDENT IN AN ORGANIZED HEALTH CARE EDUCATION/TRAINING PROGRAM
Payer: SELF-PAY

## 2025-06-23 ENCOUNTER — NON-APPOINTMENT (OUTPATIENT)
Age: 55
End: 2025-06-23

## 2025-06-23 VITALS
RESPIRATION RATE: 20 BRPM | WEIGHT: 225.09 LBS | OXYGEN SATURATION: 96 % | HEART RATE: 94 BPM | SYSTOLIC BLOOD PRESSURE: 140 MMHG | HEIGHT: 70 IN | TEMPERATURE: 98 F | DIASTOLIC BLOOD PRESSURE: 108 MMHG

## 2025-06-23 DIAGNOSIS — I50.9 HEART FAILURE, UNSPECIFIED: ICD-10-CM

## 2025-06-23 DIAGNOSIS — E03.9 HYPOTHYROIDISM, UNSPECIFIED: ICD-10-CM

## 2025-06-23 DIAGNOSIS — I10 ESSENTIAL (PRIMARY) HYPERTENSION: ICD-10-CM

## 2025-06-23 DIAGNOSIS — Z98.890 OTHER SPECIFIED POSTPROCEDURAL STATES: Chronic | ICD-10-CM

## 2025-06-23 DIAGNOSIS — Z29.9 ENCOUNTER FOR PROPHYLACTIC MEASURES, UNSPECIFIED: ICD-10-CM

## 2025-06-23 DIAGNOSIS — N17.9 ACUTE KIDNEY FAILURE, UNSPECIFIED: ICD-10-CM

## 2025-06-23 DIAGNOSIS — R06.02 SHORTNESS OF BREATH: ICD-10-CM

## 2025-06-23 DIAGNOSIS — M10.9 GOUT, UNSPECIFIED: ICD-10-CM

## 2025-06-23 LAB
ALBUMIN SERPL ELPH-MCNC: 2.9 G/DL — LOW (ref 3.3–5)
ALP SERPL-CCNC: 56 U/L — SIGNIFICANT CHANGE UP (ref 40–120)
ALT FLD-CCNC: 83 U/L — HIGH (ref 12–78)
ANION GAP SERPL CALC-SCNC: 7 MMOL/L — SIGNIFICANT CHANGE UP (ref 5–17)
AST SERPL-CCNC: 60 U/L — HIGH (ref 15–37)
BASOPHILS # BLD AUTO: 0.04 K/UL — SIGNIFICANT CHANGE UP (ref 0–0.2)
BASOPHILS NFR BLD AUTO: 0.5 % — SIGNIFICANT CHANGE UP (ref 0–2)
BILIRUB SERPL-MCNC: 1.6 MG/DL — HIGH (ref 0.2–1.2)
BUN SERPL-MCNC: 28 MG/DL — HIGH (ref 7–23)
CALCIUM SERPL-MCNC: 8.5 MG/DL — SIGNIFICANT CHANGE UP (ref 8.5–10.1)
CHLORIDE SERPL-SCNC: 106 MMOL/L — SIGNIFICANT CHANGE UP (ref 96–108)
CO2 SERPL-SCNC: 26 MMOL/L — SIGNIFICANT CHANGE UP (ref 22–31)
CREAT SERPL-MCNC: 1.4 MG/DL — HIGH (ref 0.5–1.3)
EGFR: 59 ML/MIN/1.73M2 — LOW
EGFR: 59 ML/MIN/1.73M2 — LOW
EOSINOPHIL # BLD AUTO: 0.1 K/UL — SIGNIFICANT CHANGE UP (ref 0–0.5)
EOSINOPHIL NFR BLD AUTO: 1.3 % — SIGNIFICANT CHANGE UP (ref 0–6)
GLUCOSE SERPL-MCNC: 86 MG/DL — SIGNIFICANT CHANGE UP (ref 70–99)
HCT VFR BLD CALC: 48.3 % — SIGNIFICANT CHANGE UP (ref 39–50)
HGB BLD-MCNC: 16.2 G/DL — SIGNIFICANT CHANGE UP (ref 13–17)
IMM GRANULOCYTES # BLD AUTO: 0.01 K/UL — SIGNIFICANT CHANGE UP (ref 0–0.07)
IMM GRANULOCYTES NFR BLD AUTO: 0.1 % — SIGNIFICANT CHANGE UP (ref 0–0.9)
LYMPHOCYTES # BLD AUTO: 2.31 K/UL — SIGNIFICANT CHANGE UP (ref 1–3.3)
LYMPHOCYTES NFR BLD AUTO: 29.1 % — SIGNIFICANT CHANGE UP (ref 13–44)
MCHC RBC-ENTMCNC: 29.2 PG — SIGNIFICANT CHANGE UP (ref 27–34)
MCHC RBC-ENTMCNC: 33.5 G/DL — SIGNIFICANT CHANGE UP (ref 32–36)
MCV RBC AUTO: 87 FL — SIGNIFICANT CHANGE UP (ref 80–100)
MONOCYTES # BLD AUTO: 0.73 K/UL — SIGNIFICANT CHANGE UP (ref 0–0.9)
MONOCYTES NFR BLD AUTO: 9.2 % — SIGNIFICANT CHANGE UP (ref 2–14)
NEUTROPHILS # BLD AUTO: 4.74 K/UL — SIGNIFICANT CHANGE UP (ref 1.8–7.4)
NEUTROPHILS NFR BLD AUTO: 59.8 % — SIGNIFICANT CHANGE UP (ref 43–77)
NRBC # BLD AUTO: 0 K/UL — SIGNIFICANT CHANGE UP (ref 0–0)
NRBC # FLD: 0 K/UL — SIGNIFICANT CHANGE UP (ref 0–0)
NRBC BLD AUTO-RTO: 0 /100 WBCS — SIGNIFICANT CHANGE UP (ref 0–0)
NT-PROBNP SERPL-SCNC: 8169 PG/ML — HIGH (ref 0–125)
PLATELET # BLD AUTO: 215 K/UL — SIGNIFICANT CHANGE UP (ref 150–400)
PMV BLD: 10.5 FL — SIGNIFICANT CHANGE UP (ref 7–13)
POTASSIUM SERPL-MCNC: 4.9 MMOL/L — SIGNIFICANT CHANGE UP (ref 3.5–5.3)
POTASSIUM SERPL-SCNC: 4.9 MMOL/L — SIGNIFICANT CHANGE UP (ref 3.5–5.3)
PROT SERPL-MCNC: 5.6 G/DL — LOW (ref 6–8.3)
RBC # BLD: 5.55 M/UL — SIGNIFICANT CHANGE UP (ref 4.2–5.8)
RBC # FLD: 14.9 % — HIGH (ref 10.3–14.5)
SODIUM SERPL-SCNC: 139 MMOL/L — SIGNIFICANT CHANGE UP (ref 135–145)
TROPONIN I, HIGH SENSITIVITY RESULT: 76.9 NG/L — SIGNIFICANT CHANGE UP
WBC # BLD: 7.93 K/UL — SIGNIFICANT CHANGE UP (ref 3.8–10.5)
WBC # FLD AUTO: 7.93 K/UL — SIGNIFICANT CHANGE UP (ref 3.8–10.5)

## 2025-06-23 PROCEDURE — 71045 X-RAY EXAM CHEST 1 VIEW: CPT | Mod: 26

## 2025-06-23 PROCEDURE — 93010 ELECTROCARDIOGRAM REPORT: CPT

## 2025-06-23 PROCEDURE — 99223 1ST HOSP IP/OBS HIGH 75: CPT | Mod: GC

## 2025-06-23 PROCEDURE — 99285 EMERGENCY DEPT VISIT HI MDM: CPT

## 2025-06-23 RX ORDER — FLUTICASONE FUROATE, UMECLIDINIUM BROMIDE AND VILANTEROL TRIFENATATE 100; 62.5; 25 UG/1; UG/1; UG/1
1 POWDER RESPIRATORY (INHALATION)
Refills: 0 | DISCHARGE

## 2025-06-23 RX ORDER — FUROSEMIDE 10 MG/ML
40 INJECTION INTRAMUSCULAR; INTRAVENOUS
Refills: 0 | Status: DISCONTINUED | OUTPATIENT
Start: 2025-06-24 | End: 2025-06-25

## 2025-06-23 RX ORDER — LEVALBUTEROL HYDROCHLORIDE 1.25 MG/3ML
3 SOLUTION RESPIRATORY (INHALATION)
Refills: 0 | DISCHARGE

## 2025-06-23 RX ORDER — SACUBITRIL AND VALSARTAN 6; 6 MG/1; MG/1
1 PELLET ORAL
Refills: 0 | Status: DISCONTINUED | OUTPATIENT
Start: 2025-06-23 | End: 2025-06-23

## 2025-06-23 RX ORDER — IPRATROPIUM BROMIDE AND ALBUTEROL SULFATE .5; 2.5 MG/3ML; MG/3ML
3 SOLUTION RESPIRATORY (INHALATION) EVERY 6 HOURS
Refills: 0 | Status: DISCONTINUED | OUTPATIENT
Start: 2025-06-23 | End: 2025-06-28

## 2025-06-23 RX ORDER — LEVOTHYROXINE SODIUM 300 MCG
50 TABLET ORAL DAILY
Refills: 0 | Status: DISCONTINUED | OUTPATIENT
Start: 2025-06-23 | End: 2025-06-28

## 2025-06-23 RX ORDER — FUROSEMIDE 10 MG/ML
40 INJECTION INTRAMUSCULAR; INTRAVENOUS ONCE
Refills: 0 | Status: COMPLETED | OUTPATIENT
Start: 2025-06-23 | End: 2025-06-23

## 2025-06-23 RX ORDER — MELATONIN 5 MG
3 TABLET ORAL AT BEDTIME
Refills: 0 | Status: DISCONTINUED | OUTPATIENT
Start: 2025-06-23 | End: 2025-06-28

## 2025-06-23 RX ORDER — CARVEDILOL 3.12 MG/1
6.25 TABLET, FILM COATED ORAL EVERY 12 HOURS
Refills: 0 | Status: DISCONTINUED | OUTPATIENT
Start: 2025-06-23 | End: 2025-06-28

## 2025-06-23 RX ADMIN — FUROSEMIDE 40 MILLIGRAM(S): 10 INJECTION INTRAMUSCULAR; INTRAVENOUS at 20:10

## 2025-06-24 PROBLEM — E03.9 HYPOTHYROIDISM, UNSPECIFIED: Chronic | Status: INACTIVE | Noted: 2020-07-13 | Resolved: 2025-06-23

## 2025-06-24 LAB
A1C WITH ESTIMATED AVERAGE GLUCOSE RESULT: 6.5 % — HIGH (ref 4–5.6)
ALBUMIN SERPL ELPH-MCNC: 2.7 G/DL — LOW (ref 3.3–5)
ALP SERPL-CCNC: 50 U/L — SIGNIFICANT CHANGE UP (ref 40–120)
ALT FLD-CCNC: 75 U/L — SIGNIFICANT CHANGE UP (ref 12–78)
ANION GAP SERPL CALC-SCNC: 7 MMOL/L — SIGNIFICANT CHANGE UP (ref 5–17)
AST SERPL-CCNC: 46 U/L — HIGH (ref 15–37)
BASOPHILS # BLD AUTO: 0.04 K/UL — SIGNIFICANT CHANGE UP (ref 0–0.2)
BASOPHILS NFR BLD AUTO: 0.6 % — SIGNIFICANT CHANGE UP (ref 0–2)
BILIRUB SERPL-MCNC: 1.2 MG/DL — SIGNIFICANT CHANGE UP (ref 0.2–1.2)
BUN SERPL-MCNC: 28 MG/DL — HIGH (ref 7–23)
CALCIUM SERPL-MCNC: 8.1 MG/DL — LOW (ref 8.5–10.1)
CHLORIDE SERPL-SCNC: 107 MMOL/L — SIGNIFICANT CHANGE UP (ref 96–108)
CHOLEST SERPL-MCNC: 149 MG/DL — SIGNIFICANT CHANGE UP
CO2 SERPL-SCNC: 27 MMOL/L — SIGNIFICANT CHANGE UP (ref 22–31)
CREAT SERPL-MCNC: 1.4 MG/DL — HIGH (ref 0.5–1.3)
EGFR: 59 ML/MIN/1.73M2 — LOW
EGFR: 59 ML/MIN/1.73M2 — LOW
EOSINOPHIL # BLD AUTO: 0.12 K/UL — SIGNIFICANT CHANGE UP (ref 0–0.5)
EOSINOPHIL NFR BLD AUTO: 1.8 % — SIGNIFICANT CHANGE UP (ref 0–6)
ESTIMATED AVERAGE GLUCOSE: 140 MG/DL — HIGH (ref 68–114)
GLUCOSE SERPL-MCNC: 102 MG/DL — HIGH (ref 70–99)
HCT VFR BLD CALC: 49.3 % — SIGNIFICANT CHANGE UP (ref 39–50)
HDLC SERPL-MCNC: 38 MG/DL — LOW
HGB BLD-MCNC: 16.6 G/DL — SIGNIFICANT CHANGE UP (ref 13–17)
IMM GRANULOCYTES # BLD AUTO: 0.02 K/UL — SIGNIFICANT CHANGE UP (ref 0–0.07)
IMM GRANULOCYTES NFR BLD AUTO: 0.3 % — SIGNIFICANT CHANGE UP (ref 0–0.9)
LDLC SERPL-MCNC: 99 MG/DL — SIGNIFICANT CHANGE UP
LIPID PNL WITH DIRECT LDL SERPL: 99 MG/DL — SIGNIFICANT CHANGE UP
LYMPHOCYTES # BLD AUTO: 2.2 K/UL — SIGNIFICANT CHANGE UP (ref 1–3.3)
LYMPHOCYTES NFR BLD AUTO: 32.5 % — SIGNIFICANT CHANGE UP (ref 13–44)
MAGNESIUM SERPL-MCNC: 2 MG/DL — SIGNIFICANT CHANGE UP (ref 1.6–2.6)
MCHC RBC-ENTMCNC: 29 PG — SIGNIFICANT CHANGE UP (ref 27–34)
MCHC RBC-ENTMCNC: 33.7 G/DL — SIGNIFICANT CHANGE UP (ref 32–36)
MCV RBC AUTO: 86 FL — SIGNIFICANT CHANGE UP (ref 80–100)
MONOCYTES # BLD AUTO: 0.56 K/UL — SIGNIFICANT CHANGE UP (ref 0–0.9)
MONOCYTES NFR BLD AUTO: 8.3 % — SIGNIFICANT CHANGE UP (ref 2–14)
NEUTROPHILS # BLD AUTO: 3.82 K/UL — SIGNIFICANT CHANGE UP (ref 1.8–7.4)
NEUTROPHILS NFR BLD AUTO: 56.5 % — SIGNIFICANT CHANGE UP (ref 43–77)
NONHDLC SERPL-MCNC: 111 MG/DL — SIGNIFICANT CHANGE UP
NRBC # BLD AUTO: 0 K/UL — SIGNIFICANT CHANGE UP (ref 0–0)
NRBC # FLD: 0 K/UL — SIGNIFICANT CHANGE UP (ref 0–0)
NRBC BLD AUTO-RTO: 0 /100 WBCS — SIGNIFICANT CHANGE UP (ref 0–0)
PHOSPHATE SERPL-MCNC: 4.3 MG/DL — SIGNIFICANT CHANGE UP (ref 2.5–4.5)
PLATELET # BLD AUTO: 203 K/UL — SIGNIFICANT CHANGE UP (ref 150–400)
PMV BLD: 10.7 FL — SIGNIFICANT CHANGE UP (ref 7–13)
POTASSIUM SERPL-MCNC: 4 MMOL/L — SIGNIFICANT CHANGE UP (ref 3.5–5.3)
POTASSIUM SERPL-SCNC: 4 MMOL/L — SIGNIFICANT CHANGE UP (ref 3.5–5.3)
PROT SERPL-MCNC: 5.6 G/DL — LOW (ref 6–8.3)
RBC # BLD: 5.73 M/UL — SIGNIFICANT CHANGE UP (ref 4.2–5.8)
RBC # FLD: 14.8 % — HIGH (ref 10.3–14.5)
SODIUM SERPL-SCNC: 141 MMOL/L — SIGNIFICANT CHANGE UP (ref 135–145)
TRIGL SERPL-MCNC: 56 MG/DL — SIGNIFICANT CHANGE UP
TSH SERPL-MCNC: 9.29 UIU/ML — HIGH (ref 0.36–3.74)
WBC # BLD: 6.76 K/UL — SIGNIFICANT CHANGE UP (ref 3.8–10.5)
WBC # FLD AUTO: 6.76 K/UL — SIGNIFICANT CHANGE UP (ref 3.8–10.5)

## 2025-06-24 PROCEDURE — 36415 COLL VENOUS BLD VENIPUNCTURE: CPT

## 2025-06-24 PROCEDURE — 84100 ASSAY OF PHOSPHORUS: CPT

## 2025-06-24 PROCEDURE — 71250 CT THORAX DX C-: CPT

## 2025-06-24 PROCEDURE — 80053 COMPREHEN METABOLIC PANEL: CPT

## 2025-06-24 PROCEDURE — 83036 HEMOGLOBIN GLYCOSYLATED A1C: CPT

## 2025-06-24 PROCEDURE — 84484 ASSAY OF TROPONIN QUANT: CPT

## 2025-06-24 PROCEDURE — 84443 ASSAY THYROID STIM HORMONE: CPT

## 2025-06-24 PROCEDURE — 99232 SBSQ HOSP IP/OBS MODERATE 35: CPT

## 2025-06-24 PROCEDURE — 71045 X-RAY EXAM CHEST 1 VIEW: CPT

## 2025-06-24 PROCEDURE — 99223 1ST HOSP IP/OBS HIGH 75: CPT

## 2025-06-24 PROCEDURE — 80061 LIPID PANEL: CPT

## 2025-06-24 PROCEDURE — 85025 COMPLETE CBC W/AUTO DIFF WBC: CPT

## 2025-06-24 PROCEDURE — 93005 ELECTROCARDIOGRAM TRACING: CPT

## 2025-06-24 PROCEDURE — 71250 CT THORAX DX C-: CPT | Mod: 26

## 2025-06-24 PROCEDURE — 83735 ASSAY OF MAGNESIUM: CPT

## 2025-06-24 PROCEDURE — 83880 ASSAY OF NATRIURETIC PEPTIDE: CPT

## 2025-06-24 RX ORDER — EMPAGLIFLOZIN 25 MG/1
1 TABLET, FILM COATED ORAL
Qty: 30 | Refills: 0
Start: 2025-06-24 | End: 2025-07-23

## 2025-06-24 RX ORDER — LEVOTHYROXINE SODIUM 300 MCG
1 TABLET ORAL
Qty: 30 | Refills: 0
Start: 2025-06-24 | End: 2025-07-23

## 2025-06-24 RX ORDER — SACUBITRIL AND VALSARTAN 6; 6 MG/1; MG/1
1 PELLET ORAL
Qty: 60 | Refills: 0
Start: 2025-06-24 | End: 2025-07-23

## 2025-06-24 RX ORDER — EMPAGLIFLOZIN 25 MG/1
1 TABLET, FILM COATED ORAL
Refills: 0 | DISCHARGE

## 2025-06-24 RX ORDER — FUROSEMIDE 10 MG/ML
1 INJECTION INTRAMUSCULAR; INTRAVENOUS
Qty: 30 | Refills: 0
Start: 2025-06-24 | End: 2025-07-23

## 2025-06-24 RX ORDER — HEPARIN SODIUM 1000 [USP'U]/ML
5000 INJECTION INTRAVENOUS; SUBCUTANEOUS EVERY 12 HOURS
Refills: 0 | Status: DISCONTINUED | OUTPATIENT
Start: 2025-06-24 | End: 2025-06-28

## 2025-06-24 RX ORDER — DAPAGLIFLOZIN 5 MG/1
1 TABLET, FILM COATED ORAL
Qty: 30 | Refills: 0
Start: 2025-06-24 | End: 2025-07-23

## 2025-06-24 RX ORDER — POLYETHYLENE GLYCOL 3350 17 G/17G
17 POWDER, FOR SOLUTION ORAL DAILY
Refills: 0 | Status: DISCONTINUED | OUTPATIENT
Start: 2025-06-24 | End: 2025-06-28

## 2025-06-24 RX ORDER — SENNA 187 MG
2 TABLET ORAL AT BEDTIME
Refills: 0 | Status: DISCONTINUED | OUTPATIENT
Start: 2025-06-24 | End: 2025-06-28

## 2025-06-24 RX ORDER — SACUBITRIL AND VALSARTAN 6; 6 MG/1; MG/1
1 PELLET ORAL
Qty: 30 | Refills: 0
Start: 2025-06-24 | End: 2025-07-23

## 2025-06-24 RX ORDER — CARVEDILOL 3.12 MG/1
1 TABLET, FILM COATED ORAL
Qty: 60 | Refills: 0
Start: 2025-06-24 | End: 2025-07-23

## 2025-06-24 RX ORDER — SACUBITRIL AND VALSARTAN 6; 6 MG/1; MG/1
1 PELLET ORAL
Refills: 0 | DISCHARGE

## 2025-06-24 RX ADMIN — Medication 25 MILLIGRAM(S): at 18:38

## 2025-06-24 RX ADMIN — HEPARIN SODIUM 5000 UNIT(S): 1000 INJECTION INTRAVENOUS; SUBCUTANEOUS at 18:38

## 2025-06-24 RX ADMIN — Medication 2 TABLET(S): at 14:08

## 2025-06-24 RX ADMIN — CARVEDILOL 6.25 MILLIGRAM(S): 3.12 TABLET, FILM COATED ORAL at 05:53

## 2025-06-24 RX ADMIN — FUROSEMIDE 40 MILLIGRAM(S): 10 INJECTION INTRAMUSCULAR; INTRAVENOUS at 14:07

## 2025-06-24 RX ADMIN — Medication 25 MILLIGRAM(S): at 05:54

## 2025-06-24 RX ADMIN — Medication 50 MICROGRAM(S): at 04:54

## 2025-06-24 RX ADMIN — CARVEDILOL 6.25 MILLIGRAM(S): 3.12 TABLET, FILM COATED ORAL at 18:38

## 2025-06-24 RX ADMIN — HEPARIN SODIUM 5000 UNIT(S): 1000 INJECTION INTRAVENOUS; SUBCUTANEOUS at 05:56

## 2025-06-24 RX ADMIN — FUROSEMIDE 40 MILLIGRAM(S): 10 INJECTION INTRAMUSCULAR; INTRAVENOUS at 05:54

## 2025-06-25 ENCOUNTER — TRANSCRIPTION ENCOUNTER (OUTPATIENT)
Age: 55
End: 2025-06-25

## 2025-06-25 ENCOUNTER — RESULT REVIEW (OUTPATIENT)
Age: 55
End: 2025-06-25

## 2025-06-25 LAB
ANION GAP SERPL CALC-SCNC: 5 MMOL/L — SIGNIFICANT CHANGE UP (ref 5–17)
BUN SERPL-MCNC: 29 MG/DL — HIGH (ref 7–23)
CALCIUM SERPL-MCNC: 8.3 MG/DL — LOW (ref 8.5–10.1)
CHLORIDE SERPL-SCNC: 104 MMOL/L — SIGNIFICANT CHANGE UP (ref 96–108)
CO2 SERPL-SCNC: 30 MMOL/L — SIGNIFICANT CHANGE UP (ref 22–31)
CREAT SERPL-MCNC: 1.3 MG/DL — SIGNIFICANT CHANGE UP (ref 0.5–1.3)
EGFR: 65 ML/MIN/1.73M2 — SIGNIFICANT CHANGE UP
EGFR: 65 ML/MIN/1.73M2 — SIGNIFICANT CHANGE UP
GLUCOSE SERPL-MCNC: 106 MG/DL — HIGH (ref 70–99)
HCT VFR BLD CALC: 52.9 % — HIGH (ref 39–50)
HGB BLD-MCNC: 17 G/DL — SIGNIFICANT CHANGE UP (ref 13–17)
MCHC RBC-ENTMCNC: 28.3 PG — SIGNIFICANT CHANGE UP (ref 27–34)
MCHC RBC-ENTMCNC: 32.1 G/DL — SIGNIFICANT CHANGE UP (ref 32–36)
MCV RBC AUTO: 88 FL — SIGNIFICANT CHANGE UP (ref 80–100)
NRBC # BLD AUTO: 0 K/UL — SIGNIFICANT CHANGE UP (ref 0–0)
NRBC # FLD: 0 K/UL — SIGNIFICANT CHANGE UP (ref 0–0)
NRBC BLD AUTO-RTO: 0 /100 WBCS — SIGNIFICANT CHANGE UP (ref 0–0)
PLATELET # BLD AUTO: 230 K/UL — SIGNIFICANT CHANGE UP (ref 150–400)
PMV BLD: 11.2 FL — SIGNIFICANT CHANGE UP (ref 7–13)
POTASSIUM SERPL-MCNC: 4 MMOL/L — SIGNIFICANT CHANGE UP (ref 3.5–5.3)
POTASSIUM SERPL-SCNC: 4 MMOL/L — SIGNIFICANT CHANGE UP (ref 3.5–5.3)
RBC # BLD: 6.01 M/UL — HIGH (ref 4.2–5.8)
RBC # FLD: 14.7 % — HIGH (ref 10.3–14.5)
SODIUM SERPL-SCNC: 139 MMOL/L — SIGNIFICANT CHANGE UP (ref 135–145)
WBC # BLD: 7.28 K/UL — SIGNIFICANT CHANGE UP (ref 3.8–10.5)
WBC # FLD AUTO: 7.28 K/UL — SIGNIFICANT CHANGE UP (ref 3.8–10.5)

## 2025-06-25 PROCEDURE — 84443 ASSAY THYROID STIM HORMONE: CPT

## 2025-06-25 PROCEDURE — 83735 ASSAY OF MAGNESIUM: CPT

## 2025-06-25 PROCEDURE — 99233 SBSQ HOSP IP/OBS HIGH 50: CPT | Mod: GC

## 2025-06-25 PROCEDURE — 36415 COLL VENOUS BLD VENIPUNCTURE: CPT

## 2025-06-25 PROCEDURE — 71250 CT THORAX DX C-: CPT

## 2025-06-25 PROCEDURE — 84100 ASSAY OF PHOSPHORUS: CPT

## 2025-06-25 PROCEDURE — 83880 ASSAY OF NATRIURETIC PEPTIDE: CPT

## 2025-06-25 PROCEDURE — 71045 X-RAY EXAM CHEST 1 VIEW: CPT

## 2025-06-25 PROCEDURE — 93005 ELECTROCARDIOGRAM TRACING: CPT

## 2025-06-25 PROCEDURE — 93306 TTE W/DOPPLER COMPLETE: CPT | Mod: 26

## 2025-06-25 PROCEDURE — 80053 COMPREHEN METABOLIC PANEL: CPT

## 2025-06-25 PROCEDURE — 85025 COMPLETE CBC W/AUTO DIFF WBC: CPT

## 2025-06-25 PROCEDURE — 85027 COMPLETE CBC AUTOMATED: CPT

## 2025-06-25 PROCEDURE — 80048 BASIC METABOLIC PNL TOTAL CA: CPT

## 2025-06-25 PROCEDURE — 99233 SBSQ HOSP IP/OBS HIGH 50: CPT

## 2025-06-25 PROCEDURE — 80061 LIPID PANEL: CPT

## 2025-06-25 PROCEDURE — 84484 ASSAY OF TROPONIN QUANT: CPT

## 2025-06-25 PROCEDURE — 83036 HEMOGLOBIN GLYCOSYLATED A1C: CPT

## 2025-06-25 RX ORDER — FUROSEMIDE 10 MG/ML
80 INJECTION INTRAMUSCULAR; INTRAVENOUS
Refills: 0 | Status: DISCONTINUED | OUTPATIENT
Start: 2025-06-26 | End: 2025-06-27

## 2025-06-25 RX ORDER — DAPAGLIFLOZIN 5 MG/1
1 TABLET, FILM COATED ORAL
Qty: 30 | Refills: 0
Start: 2025-06-25 | End: 2025-07-24

## 2025-06-25 RX ORDER — FUROSEMIDE 10 MG/ML
80 INJECTION INTRAMUSCULAR; INTRAVENOUS ONCE
Refills: 0 | Status: COMPLETED | OUTPATIENT
Start: 2025-06-25 | End: 2025-06-25

## 2025-06-25 RX ADMIN — HEPARIN SODIUM 5000 UNIT(S): 1000 INJECTION INTRAVENOUS; SUBCUTANEOUS at 17:22

## 2025-06-25 RX ADMIN — Medication 25 MILLIGRAM(S): at 13:11

## 2025-06-25 RX ADMIN — Medication 100 MILLIGRAM(S): at 13:11

## 2025-06-25 RX ADMIN — CARVEDILOL 6.25 MILLIGRAM(S): 3.12 TABLET, FILM COATED ORAL at 17:22

## 2025-06-25 RX ADMIN — HEPARIN SODIUM 5000 UNIT(S): 1000 INJECTION INTRAVENOUS; SUBCUTANEOUS at 05:51

## 2025-06-25 RX ADMIN — Medication 25 MILLIGRAM(S): at 21:28

## 2025-06-25 RX ADMIN — FUROSEMIDE 80 MILLIGRAM(S): 10 INJECTION INTRAMUSCULAR; INTRAVENOUS at 13:11

## 2025-06-25 RX ADMIN — Medication 50 MICROGRAM(S): at 05:52

## 2025-06-25 RX ADMIN — CARVEDILOL 6.25 MILLIGRAM(S): 3.12 TABLET, FILM COATED ORAL at 05:52

## 2025-06-25 RX ADMIN — Medication 25 MILLIGRAM(S): at 05:52

## 2025-06-25 RX ADMIN — FUROSEMIDE 40 MILLIGRAM(S): 10 INJECTION INTRAMUSCULAR; INTRAVENOUS at 05:51

## 2025-06-26 LAB
ALBUMIN SERPL ELPH-MCNC: 2.7 G/DL — LOW (ref 3.3–5)
ALP SERPL-CCNC: 61 U/L — SIGNIFICANT CHANGE UP (ref 40–120)
ALT FLD-CCNC: 58 U/L — SIGNIFICANT CHANGE UP (ref 12–78)
ANION GAP SERPL CALC-SCNC: 7 MMOL/L — SIGNIFICANT CHANGE UP (ref 5–17)
AST SERPL-CCNC: 35 U/L — SIGNIFICANT CHANGE UP (ref 15–37)
BILIRUB SERPL-MCNC: 1.3 MG/DL — HIGH (ref 0.2–1.2)
BUN SERPL-MCNC: 26 MG/DL — HIGH (ref 7–23)
CALCIUM SERPL-MCNC: 8.4 MG/DL — LOW (ref 8.5–10.1)
CHLORIDE SERPL-SCNC: 104 MMOL/L — SIGNIFICANT CHANGE UP (ref 96–108)
CO2 SERPL-SCNC: 27 MMOL/L — SIGNIFICANT CHANGE UP (ref 22–31)
CREAT SERPL-MCNC: 1.1 MG/DL — SIGNIFICANT CHANGE UP (ref 0.5–1.3)
EGFR: 79 ML/MIN/1.73M2 — SIGNIFICANT CHANGE UP
EGFR: 79 ML/MIN/1.73M2 — SIGNIFICANT CHANGE UP
GLUCOSE SERPL-MCNC: 92 MG/DL — SIGNIFICANT CHANGE UP (ref 70–99)
HCT VFR BLD CALC: 50.4 % — HIGH (ref 39–50)
HGB BLD-MCNC: 17.2 G/DL — HIGH (ref 13–17)
MAGNESIUM SERPL-MCNC: 1.9 MG/DL — SIGNIFICANT CHANGE UP (ref 1.6–2.6)
MCHC RBC-ENTMCNC: 29.4 PG — SIGNIFICANT CHANGE UP (ref 27–34)
MCHC RBC-ENTMCNC: 34.1 G/DL — SIGNIFICANT CHANGE UP (ref 32–36)
MCV RBC AUTO: 86.2 FL — SIGNIFICANT CHANGE UP (ref 80–100)
NRBC # BLD AUTO: 0 K/UL — SIGNIFICANT CHANGE UP (ref 0–0)
NRBC # FLD: 0 K/UL — SIGNIFICANT CHANGE UP (ref 0–0)
NRBC BLD AUTO-RTO: 0 /100 WBCS — SIGNIFICANT CHANGE UP (ref 0–0)
PHOSPHATE SERPL-MCNC: 4.2 MG/DL — SIGNIFICANT CHANGE UP (ref 2.5–4.5)
PLATELET # BLD AUTO: 203 K/UL — SIGNIFICANT CHANGE UP (ref 150–400)
PMV BLD: 11.4 FL — SIGNIFICANT CHANGE UP (ref 7–13)
POTASSIUM SERPL-MCNC: 4.2 MMOL/L — SIGNIFICANT CHANGE UP (ref 3.5–5.3)
POTASSIUM SERPL-SCNC: 4.2 MMOL/L — SIGNIFICANT CHANGE UP (ref 3.5–5.3)
PROT SERPL-MCNC: 6 G/DL — SIGNIFICANT CHANGE UP (ref 6–8.3)
RBC # BLD: 5.85 M/UL — HIGH (ref 4.2–5.8)
RBC # FLD: 15 % — HIGH (ref 10.3–14.5)
SODIUM SERPL-SCNC: 138 MMOL/L — SIGNIFICANT CHANGE UP (ref 135–145)
WBC # BLD: 7.12 K/UL — SIGNIFICANT CHANGE UP (ref 3.8–10.5)
WBC # FLD AUTO: 7.12 K/UL — SIGNIFICANT CHANGE UP (ref 3.8–10.5)

## 2025-06-26 PROCEDURE — 99233 SBSQ HOSP IP/OBS HIGH 50: CPT

## 2025-06-26 PROCEDURE — 99233 SBSQ HOSP IP/OBS HIGH 50: CPT | Mod: GC

## 2025-06-26 RX ORDER — SACUBITRIL AND VALSARTAN 6; 6 MG/1; MG/1
1 PELLET ORAL
Refills: 0 | Status: DISCONTINUED | OUTPATIENT
Start: 2025-06-26 | End: 2025-06-28

## 2025-06-26 RX ADMIN — Medication 25 MILLIGRAM(S): at 13:03

## 2025-06-26 RX ADMIN — CARVEDILOL 6.25 MILLIGRAM(S): 3.12 TABLET, FILM COATED ORAL at 17:01

## 2025-06-26 RX ADMIN — FUROSEMIDE 80 MILLIGRAM(S): 10 INJECTION INTRAMUSCULAR; INTRAVENOUS at 06:11

## 2025-06-26 RX ADMIN — HEPARIN SODIUM 5000 UNIT(S): 1000 INJECTION INTRAVENOUS; SUBCUTANEOUS at 06:12

## 2025-06-26 RX ADMIN — Medication 25 MILLIGRAM(S): at 21:03

## 2025-06-26 RX ADMIN — Medication 25 MILLIGRAM(S): at 06:12

## 2025-06-26 RX ADMIN — FUROSEMIDE 80 MILLIGRAM(S): 10 INJECTION INTRAMUSCULAR; INTRAVENOUS at 17:01

## 2025-06-26 RX ADMIN — Medication 100 MILLIGRAM(S): at 13:03

## 2025-06-26 RX ADMIN — Medication 50 MICROGRAM(S): at 06:12

## 2025-06-26 RX ADMIN — HEPARIN SODIUM 5000 UNIT(S): 1000 INJECTION INTRAVENOUS; SUBCUTANEOUS at 17:01

## 2025-06-26 RX ADMIN — CARVEDILOL 6.25 MILLIGRAM(S): 3.12 TABLET, FILM COATED ORAL at 06:12

## 2025-06-26 RX ADMIN — SACUBITRIL AND VALSARTAN 1 TABLET(S): 6; 6 PELLET ORAL at 17:01

## 2025-06-27 ENCOUNTER — TRANSCRIPTION ENCOUNTER (OUTPATIENT)
Age: 55
End: 2025-06-27

## 2025-06-27 LAB
ANION GAP SERPL CALC-SCNC: 5 MMOL/L — SIGNIFICANT CHANGE UP (ref 5–17)
BUN SERPL-MCNC: 25 MG/DL — HIGH (ref 7–23)
CALCIUM SERPL-MCNC: 8.7 MG/DL — SIGNIFICANT CHANGE UP (ref 8.5–10.1)
CHLORIDE SERPL-SCNC: 101 MMOL/L — SIGNIFICANT CHANGE UP (ref 96–108)
CO2 SERPL-SCNC: 31 MMOL/L — SIGNIFICANT CHANGE UP (ref 22–31)
CREAT SERPL-MCNC: 1.1 MG/DL — SIGNIFICANT CHANGE UP (ref 0.5–1.3)
EGFR: 79 ML/MIN/1.73M2 — SIGNIFICANT CHANGE UP
EGFR: 79 ML/MIN/1.73M2 — SIGNIFICANT CHANGE UP
GLUCOSE SERPL-MCNC: 103 MG/DL — HIGH (ref 70–99)
HCT VFR BLD CALC: 56.7 % — HIGH (ref 39–50)
HGB BLD-MCNC: 18.6 G/DL — HIGH (ref 13–17)
MAGNESIUM SERPL-MCNC: 2.1 MG/DL — SIGNIFICANT CHANGE UP (ref 1.6–2.6)
MCHC RBC-ENTMCNC: 28.9 PG — SIGNIFICANT CHANGE UP (ref 27–34)
MCHC RBC-ENTMCNC: 32.8 G/DL — SIGNIFICANT CHANGE UP (ref 32–36)
MCV RBC AUTO: 88.2 FL — SIGNIFICANT CHANGE UP (ref 80–100)
NRBC # BLD AUTO: 0 K/UL — SIGNIFICANT CHANGE UP (ref 0–0)
NRBC # FLD: 0 K/UL — SIGNIFICANT CHANGE UP (ref 0–0)
NRBC BLD AUTO-RTO: 0 /100 WBCS — SIGNIFICANT CHANGE UP (ref 0–0)
PHOSPHATE SERPL-MCNC: 4.2 MG/DL — SIGNIFICANT CHANGE UP (ref 2.5–4.5)
PLATELET # BLD AUTO: 198 K/UL — SIGNIFICANT CHANGE UP (ref 150–400)
PMV BLD: 11 FL — SIGNIFICANT CHANGE UP (ref 7–13)
POTASSIUM SERPL-MCNC: 5 MMOL/L — SIGNIFICANT CHANGE UP (ref 3.5–5.3)
POTASSIUM SERPL-SCNC: 5 MMOL/L — SIGNIFICANT CHANGE UP (ref 3.5–5.3)
RBC # BLD: 6.43 M/UL — HIGH (ref 4.2–5.8)
RBC # FLD: 16.4 % — HIGH (ref 10.3–14.5)
SODIUM SERPL-SCNC: 137 MMOL/L — SIGNIFICANT CHANGE UP (ref 135–145)
WBC # BLD: 7.19 K/UL — SIGNIFICANT CHANGE UP (ref 3.8–10.5)
WBC # FLD AUTO: 7.19 K/UL — SIGNIFICANT CHANGE UP (ref 3.8–10.5)

## 2025-06-27 PROCEDURE — 99233 SBSQ HOSP IP/OBS HIGH 50: CPT | Mod: GC

## 2025-06-27 PROCEDURE — 99232 SBSQ HOSP IP/OBS MODERATE 35: CPT

## 2025-06-27 RX ORDER — FUROSEMIDE 10 MG/ML
80 INJECTION INTRAMUSCULAR; INTRAVENOUS EVERY 12 HOURS
Refills: 0 | Status: DISCONTINUED | OUTPATIENT
Start: 2025-06-27 | End: 2025-06-28

## 2025-06-27 RX ORDER — FUROSEMIDE 10 MG/ML
1 INJECTION INTRAMUSCULAR; INTRAVENOUS
Qty: 60 | Refills: 0
Start: 2025-06-27 | End: 2025-07-26

## 2025-06-27 RX ORDER — SPIRONOLACTONE 25 MG
12.5 TABLET ORAL DAILY
Refills: 0 | Status: DISCONTINUED | OUTPATIENT
Start: 2025-06-27 | End: 2025-06-28

## 2025-06-27 RX ORDER — CARVEDILOL 3.12 MG/1
1 TABLET, FILM COATED ORAL
Qty: 60 | Refills: 0
Start: 2025-06-27 | End: 2025-07-26

## 2025-06-27 RX ORDER — SACUBITRIL AND VALSARTAN 6; 6 MG/1; MG/1
1 PELLET ORAL
Qty: 0 | Refills: 0 | DISCHARGE
Start: 2025-06-27

## 2025-06-27 RX ORDER — SPIRONOLACTONE 25 MG
0.5 TABLET ORAL
Qty: 15 | Refills: 0
Start: 2025-06-27 | End: 2025-07-26

## 2025-06-27 RX ADMIN — Medication 25 MILLIGRAM(S): at 18:38

## 2025-06-27 RX ADMIN — Medication 100 MILLIGRAM(S): at 11:12

## 2025-06-27 RX ADMIN — Medication 50 MICROGRAM(S): at 05:42

## 2025-06-27 RX ADMIN — SACUBITRIL AND VALSARTAN 1 TABLET(S): 6; 6 PELLET ORAL at 05:42

## 2025-06-27 RX ADMIN — FUROSEMIDE 80 MILLIGRAM(S): 10 INJECTION INTRAMUSCULAR; INTRAVENOUS at 17:13

## 2025-06-27 RX ADMIN — CARVEDILOL 6.25 MILLIGRAM(S): 3.12 TABLET, FILM COATED ORAL at 17:12

## 2025-06-27 RX ADMIN — CARVEDILOL 6.25 MILLIGRAM(S): 3.12 TABLET, FILM COATED ORAL at 05:42

## 2025-06-27 RX ADMIN — SACUBITRIL AND VALSARTAN 1 TABLET(S): 6; 6 PELLET ORAL at 18:38

## 2025-06-27 RX ADMIN — HEPARIN SODIUM 5000 UNIT(S): 1000 INJECTION INTRAVENOUS; SUBCUTANEOUS at 17:13

## 2025-06-27 RX ADMIN — HEPARIN SODIUM 5000 UNIT(S): 1000 INJECTION INTRAVENOUS; SUBCUTANEOUS at 05:41

## 2025-06-27 RX ADMIN — Medication 25 MILLIGRAM(S): at 05:42

## 2025-06-27 RX ADMIN — Medication 2 TABLET(S): at 22:44

## 2025-06-27 RX ADMIN — FUROSEMIDE 80 MILLIGRAM(S): 10 INJECTION INTRAMUSCULAR; INTRAVENOUS at 05:42

## 2025-06-28 VITALS
OXYGEN SATURATION: 96 % | SYSTOLIC BLOOD PRESSURE: 111 MMHG | TEMPERATURE: 98 F | DIASTOLIC BLOOD PRESSURE: 84 MMHG | HEART RATE: 85 BPM | RESPIRATION RATE: 18 BRPM

## 2025-06-28 PROCEDURE — 96374 THER/PROPH/DIAG INJ IV PUSH: CPT

## 2025-06-28 PROCEDURE — 93306 TTE W/DOPPLER COMPLETE: CPT

## 2025-06-28 PROCEDURE — 80048 BASIC METABOLIC PNL TOTAL CA: CPT

## 2025-06-28 PROCEDURE — 36415 COLL VENOUS BLD VENIPUNCTURE: CPT

## 2025-06-28 PROCEDURE — 99232 SBSQ HOSP IP/OBS MODERATE 35: CPT

## 2025-06-28 PROCEDURE — 84443 ASSAY THYROID STIM HORMONE: CPT

## 2025-06-28 PROCEDURE — 83036 HEMOGLOBIN GLYCOSYLATED A1C: CPT

## 2025-06-28 PROCEDURE — 71250 CT THORAX DX C-: CPT

## 2025-06-28 PROCEDURE — 83880 ASSAY OF NATRIURETIC PEPTIDE: CPT

## 2025-06-28 PROCEDURE — 71045 X-RAY EXAM CHEST 1 VIEW: CPT

## 2025-06-28 PROCEDURE — 84100 ASSAY OF PHOSPHORUS: CPT

## 2025-06-28 PROCEDURE — 83735 ASSAY OF MAGNESIUM: CPT

## 2025-06-28 PROCEDURE — 99285 EMERGENCY DEPT VISIT HI MDM: CPT

## 2025-06-28 PROCEDURE — 85025 COMPLETE CBC W/AUTO DIFF WBC: CPT

## 2025-06-28 PROCEDURE — 85027 COMPLETE CBC AUTOMATED: CPT

## 2025-06-28 PROCEDURE — 80061 LIPID PANEL: CPT

## 2025-06-28 PROCEDURE — 93005 ELECTROCARDIOGRAM TRACING: CPT

## 2025-06-28 PROCEDURE — 84484 ASSAY OF TROPONIN QUANT: CPT

## 2025-06-28 PROCEDURE — 99239 HOSP IP/OBS DSCHRG MGMT >30: CPT

## 2025-06-28 PROCEDURE — 80053 COMPREHEN METABOLIC PANEL: CPT

## 2025-06-28 RX ORDER — SACUBITRIL AND VALSARTAN 6; 6 MG/1; MG/1
1 PELLET ORAL
Refills: 0
Start: 2025-06-28

## 2025-06-28 RX ORDER — SACUBITRIL AND VALSARTAN 6; 6 MG/1; MG/1
1 PELLET ORAL
Qty: 60 | Refills: 0
Start: 2025-06-28 | End: 2025-07-27

## 2025-06-28 RX ADMIN — Medication 100 MILLIGRAM(S): at 11:25

## 2025-06-28 RX ADMIN — SACUBITRIL AND VALSARTAN 1 TABLET(S): 6; 6 PELLET ORAL at 06:11

## 2025-06-28 RX ADMIN — CARVEDILOL 6.25 MILLIGRAM(S): 3.12 TABLET, FILM COATED ORAL at 06:11

## 2025-06-28 RX ADMIN — Medication 12.5 MILLIGRAM(S): at 06:12

## 2025-06-28 RX ADMIN — FUROSEMIDE 80 MILLIGRAM(S): 10 INJECTION INTRAMUSCULAR; INTRAVENOUS at 06:12

## 2025-06-28 RX ADMIN — HEPARIN SODIUM 5000 UNIT(S): 1000 INJECTION INTRAVENOUS; SUBCUTANEOUS at 06:13

## 2025-06-28 RX ADMIN — Medication 25 MILLIGRAM(S): at 06:12

## 2025-06-28 RX ADMIN — Medication 50 MICROGRAM(S): at 06:11

## 2025-06-30 PROBLEM — M10.9 GOUT, UNSPECIFIED: Chronic | Status: ACTIVE | Noted: 2025-06-23

## 2025-07-01 ENCOUNTER — APPOINTMENT (OUTPATIENT)
Dept: INTERNAL MEDICINE | Facility: CLINIC | Age: 55
End: 2025-07-01

## 2025-07-07 ENCOUNTER — APPOINTMENT (OUTPATIENT)
Dept: INTERNAL MEDICINE | Facility: CLINIC | Age: 55
End: 2025-07-07

## 2025-07-14 ENCOUNTER — APPOINTMENT (OUTPATIENT)
Dept: INTERNAL MEDICINE | Facility: CLINIC | Age: 55
End: 2025-07-14
Payer: COMMERCIAL

## 2025-07-14 VITALS
OXYGEN SATURATION: 97 % | WEIGHT: 224 LBS | HEIGHT: 71 IN | SYSTOLIC BLOOD PRESSURE: 110 MMHG | RESPIRATION RATE: 15 BRPM | DIASTOLIC BLOOD PRESSURE: 74 MMHG | TEMPERATURE: 98 F | BODY MASS INDEX: 31.36 KG/M2 | HEART RATE: 84 BPM

## 2025-07-14 PROBLEM — R41.3 MEMORY DIFFICULTIES: Status: ACTIVE | Noted: 2025-07-14

## 2025-07-14 PROCEDURE — 99214 OFFICE O/P EST MOD 30 MIN: CPT | Mod: 25

## 2025-07-16 LAB
ALBUMIN SERPL ELPH-MCNC: 4.1 G/DL
ALP BLD-CCNC: 68 U/L
ALT SERPL-CCNC: 26 U/L
ANION GAP SERPL CALC-SCNC: 12 MMOL/L
APPEARANCE: CLEAR
AST SERPL-CCNC: 28 U/L
BILIRUB SERPL-MCNC: 0.4 MG/DL
BILIRUBIN URINE: NEGATIVE
BLOOD URINE: NEGATIVE
BUN SERPL-MCNC: 23 MG/DL
CALCIUM SERPL-MCNC: 9.2 MG/DL
CHLORIDE SERPL-SCNC: 102 MMOL/L
CHOLEST SERPL-MCNC: 161 MG/DL
CO2 SERPL-SCNC: 26 MMOL/L
COLOR: YELLOW
CREAT SERPL-MCNC: 1.06 MG/DL
EGFRCR SERPLBLD CKD-EPI 2021: 83 ML/MIN/1.73M2
ESTIMATED AVERAGE GLUCOSE: 143 MG/DL
FOLATE SERPL-MCNC: 8.5 NG/ML
GLUCOSE QUALITATIVE U: >=1000 MG/DL
GLUCOSE SERPL-MCNC: 84 MG/DL
HBA1C MFR BLD HPLC: 6.6 %
HCT VFR BLD CALC: 48 %
HDLC SERPL-MCNC: 41 MG/DL
HGB BLD-MCNC: 14.6 G/DL
KETONES URINE: NEGATIVE MG/DL
LDLC SERPL-MCNC: 106 MG/DL
LEUKOCYTE ESTERASE URINE: NEGATIVE
MCHC RBC-ENTMCNC: 28.3 PG
MCHC RBC-ENTMCNC: 30.4 G/DL
MCV RBC AUTO: 93.2 FL
NITRITE URINE: NEGATIVE
NONHDLC SERPL-MCNC: 120 MG/DL
PH URINE: 6
PLATELET # BLD AUTO: 236 K/UL
POTASSIUM SERPL-SCNC: 4.3 MMOL/L
PROT SERPL-MCNC: 6.9 G/DL
PROTEIN URINE: NORMAL MG/DL
PSA SERPL-MCNC: 1.03 NG/ML
RBC # BLD: 5.15 M/UL
RBC # FLD: 14.3 %
SODIUM SERPL-SCNC: 140 MMOL/L
SPECIFIC GRAVITY URINE: 1.03
TRIGL SERPL-MCNC: 72 MG/DL
TSH SERPL-ACNC: 2.78 UIU/ML
UROBILINOGEN URINE: 1 MG/DL
VIT B12 SERPL-MCNC: 911 PG/ML
WBC # FLD AUTO: 7.53 K/UL

## 2025-08-05 ENCOUNTER — RX RENEWAL (OUTPATIENT)
Age: 55
End: 2025-08-05

## 2025-08-06 ENCOUNTER — TRANSCRIPTION ENCOUNTER (OUTPATIENT)
Age: 55
End: 2025-08-06